# Patient Record
Sex: MALE | Race: AMERICAN INDIAN OR ALASKA NATIVE | Employment: OTHER | URBAN - METROPOLITAN AREA
[De-identification: names, ages, dates, MRNs, and addresses within clinical notes are randomized per-mention and may not be internally consistent; named-entity substitution may affect disease eponyms.]

---

## 2017-02-22 ENCOUNTER — TELEPHONE (OUTPATIENT)
Dept: FAMILY MEDICINE CLINIC | Age: 62
End: 2017-02-22

## 2017-02-22 ENCOUNTER — OFFICE VISIT (OUTPATIENT)
Dept: FAMILY MEDICINE CLINIC | Age: 62
End: 2017-02-22

## 2017-02-22 VITALS
BODY MASS INDEX: 40.6 KG/M2 | OXYGEN SATURATION: 98 % | DIASTOLIC BLOOD PRESSURE: 80 MMHG | WEIGHT: 287 LBS | SYSTOLIC BLOOD PRESSURE: 130 MMHG | HEART RATE: 123 BPM | RESPIRATION RATE: 18 BRPM

## 2017-02-22 DIAGNOSIS — G89.4 CHRONIC PAIN SYNDROME: ICD-10-CM

## 2017-02-22 DIAGNOSIS — K21.9 GASTROESOPHAGEAL REFLUX DISEASE WITHOUT ESOPHAGITIS: ICD-10-CM

## 2017-02-22 LAB — HBA1C MFR BLD: 7.3 %

## 2017-02-22 PROCEDURE — 99214 OFFICE O/P EST MOD 30 MIN: CPT | Performed by: FAMILY MEDICINE

## 2017-02-22 RX ORDER — HYDROCODONE BITARTRATE AND ACETAMINOPHEN 5; 325 MG/1; MG/1
1 TABLET ORAL 4 TIMES DAILY PRN
Qty: 360 TABLET | Refills: 0 | Status: SHIPPED | OUTPATIENT
Start: 2017-02-22 | End: 2017-06-01 | Stop reason: SDUPTHER

## 2017-02-22 RX ORDER — OMEPRAZOLE 40 MG/1
40 CAPSULE, DELAYED RELEASE ORAL DAILY
Qty: 90 CAPSULE | Refills: 1 | Status: SHIPPED | OUTPATIENT
Start: 2017-02-22 | End: 2017-06-01 | Stop reason: SDUPTHER

## 2017-02-22 RX ORDER — OLANZAPINE 10 MG/1
INJECTION, POWDER, LYOPHILIZED, FOR SOLUTION INTRAMUSCULAR
COMMUNITY
End: 2017-09-06 | Stop reason: ALTCHOICE

## 2017-02-23 RX ORDER — ESCITALOPRAM OXALATE 10 MG/1
10 TABLET ORAL DAILY
COMMUNITY
End: 2017-09-06 | Stop reason: ALTCHOICE

## 2017-03-01 DIAGNOSIS — E78.2 MIXED HYPERLIPIDEMIA: ICD-10-CM

## 2017-03-01 RX ORDER — FENOFIBRATE 160 MG/1
TABLET ORAL
Qty: 90 TABLET | Refills: 1 | Status: SHIPPED | OUTPATIENT
Start: 2017-03-01 | End: 2017-06-01 | Stop reason: SDUPTHER

## 2017-06-01 ENCOUNTER — OFFICE VISIT (OUTPATIENT)
Dept: FAMILY MEDICINE CLINIC | Age: 62
End: 2017-06-01

## 2017-06-01 ENCOUNTER — TELEPHONE (OUTPATIENT)
Dept: FAMILY MEDICINE CLINIC | Age: 62
End: 2017-06-01

## 2017-06-01 VITALS
SYSTOLIC BLOOD PRESSURE: 134 MMHG | OXYGEN SATURATION: 98 % | WEIGHT: 291 LBS | BODY MASS INDEX: 41.16 KG/M2 | DIASTOLIC BLOOD PRESSURE: 80 MMHG | HEART RATE: 97 BPM | RESPIRATION RATE: 16 BRPM

## 2017-06-01 DIAGNOSIS — T78.40XS ALLERGY, SEQUELA: ICD-10-CM

## 2017-06-01 DIAGNOSIS — F31.81 BIPOLAR 2 DISORDER (HCC): ICD-10-CM

## 2017-06-01 DIAGNOSIS — E78.2 MIXED HYPERLIPIDEMIA: ICD-10-CM

## 2017-06-01 DIAGNOSIS — Z23 NEED FOR ZOSTAVAX ADMINISTRATION: ICD-10-CM

## 2017-06-01 DIAGNOSIS — I10 ESSENTIAL HYPERTENSION: ICD-10-CM

## 2017-06-01 DIAGNOSIS — G89.4 CHRONIC PAIN SYNDROME: ICD-10-CM

## 2017-06-01 DIAGNOSIS — E29.1 HYPOGONADISM MALE: ICD-10-CM

## 2017-06-01 DIAGNOSIS — K21.9 GASTROESOPHAGEAL REFLUX DISEASE WITHOUT ESOPHAGITIS: ICD-10-CM

## 2017-06-01 LAB
CHOLESTEROL, TOTAL: 173 MG/DL (ref 0–199)
HDLC SERPL-MCNC: 44 MG/DL (ref 40–60)
LDL CHOLESTEROL CALCULATED: ABNORMAL MG/DL
TRIGL SERPL-MCNC: 320 MG/DL (ref 0–150)
VLDLC SERPL CALC-MCNC: ABNORMAL MG/DL

## 2017-06-01 PROCEDURE — 99214 OFFICE O/P EST MOD 30 MIN: CPT | Performed by: FAMILY MEDICINE

## 2017-06-01 PROCEDURE — 36415 COLL VENOUS BLD VENIPUNCTURE: CPT | Performed by: FAMILY MEDICINE

## 2017-06-01 RX ORDER — ESCITALOPRAM OXALATE 10 MG/1
10 TABLET ORAL DAILY
Qty: 30 TABLET | Refills: 5 | Status: CANCELLED | OUTPATIENT
Start: 2017-06-01

## 2017-06-01 RX ORDER — FENOFIBRATE 160 MG/1
TABLET ORAL
Qty: 30 TABLET | Refills: 3 | Status: SHIPPED | OUTPATIENT
Start: 2017-06-01 | End: 2017-11-30 | Stop reason: ALTCHOICE

## 2017-06-01 RX ORDER — HYDROCODONE BITARTRATE AND ACETAMINOPHEN 5; 325 MG/1; MG/1
1 TABLET ORAL 4 TIMES DAILY PRN
Qty: 360 TABLET | Refills: 0 | Status: SHIPPED | OUTPATIENT
Start: 2017-06-01 | End: 2017-09-06 | Stop reason: SDUPTHER

## 2017-06-01 RX ORDER — PRAVASTATIN SODIUM 80 MG/1
TABLET ORAL
Qty: 30 TABLET | Refills: 3 | Status: SHIPPED | OUTPATIENT
Start: 2017-06-01 | End: 2017-11-30 | Stop reason: SDUPTHER

## 2017-06-01 RX ORDER — FLUTICASONE PROPIONATE 50 MCG
SPRAY, SUSPENSION (ML) NASAL
Qty: 1 BOTTLE | Refills: 3 | Status: SHIPPED | OUTPATIENT
Start: 2017-06-01 | End: 2017-09-06 | Stop reason: ALTCHOICE

## 2017-06-01 RX ORDER — ESZOPICLONE 3 MG/1
3 TABLET, FILM COATED ORAL NIGHTLY PRN
Qty: 30 TABLET | Refills: 3 | Status: SHIPPED | OUTPATIENT
Start: 2017-06-01 | End: 2018-01-04 | Stop reason: ALTCHOICE

## 2017-06-01 RX ORDER — ESZOPICLONE 3 MG/1
3 TABLET, FILM COATED ORAL NIGHTLY
COMMUNITY
End: 2017-09-06 | Stop reason: SDUPTHER

## 2017-06-01 RX ORDER — GABAPENTIN 800 MG/1
TABLET ORAL
Qty: 90 TABLET | Refills: 3 | Status: SHIPPED | OUTPATIENT
Start: 2017-06-01 | End: 2017-11-30 | Stop reason: ALTCHOICE

## 2017-06-01 RX ORDER — LOSARTAN POTASSIUM AND HYDROCHLOROTHIAZIDE 12.5; 1 MG/1; MG/1
1 TABLET ORAL DAILY
Qty: 30 TABLET | Refills: 3 | Status: SHIPPED | OUTPATIENT
Start: 2017-06-01 | End: 2017-09-06 | Stop reason: SDUPTHER

## 2017-06-01 RX ORDER — OMEPRAZOLE 40 MG/1
40 CAPSULE, DELAYED RELEASE ORAL DAILY
Qty: 30 CAPSULE | Refills: 3 | Status: SHIPPED | OUTPATIENT
Start: 2017-06-01 | End: 2017-09-06 | Stop reason: SDUPTHER

## 2017-06-01 RX ORDER — TESTOSTERONE 10 MG/.5G
GEL, METERED TOPICAL
Qty: 1 BOTTLE | Refills: 3 | Status: SHIPPED | OUTPATIENT
Start: 2017-06-01 | End: 2017-07-26 | Stop reason: CLARIF

## 2017-06-02 LAB
CREATININE URINE: 122.9 MG/DL (ref 39–259)
ESTIMATED AVERAGE GLUCOSE: 185.8 MG/DL
HBA1C MFR BLD: 8.1 %
LDL CHOLESTEROL DIRECT: 89 MG/DL
MICROALBUMIN UR-MCNC: <1.2 MG/DL
MICROALBUMIN/CREAT UR-RTO: NORMAL MG/G (ref 0–30)

## 2017-06-26 ENCOUNTER — TELEPHONE (OUTPATIENT)
Dept: FAMILY MEDICINE CLINIC | Age: 62
End: 2017-06-26

## 2017-07-26 RX ORDER — TESTOSTERONE 30 MG/1.5ML
SOLUTION TOPICAL
Qty: 1 BOTTLE | Refills: 5 | Status: SHIPPED | OUTPATIENT
Start: 2017-07-26 | End: 2017-08-07 | Stop reason: SDUPTHER

## 2017-08-07 RX ORDER — TESTOSTERONE 30 MG/1.5ML
SOLUTION TOPICAL
Qty: 1 BOTTLE | Refills: 5 | Status: SHIPPED | OUTPATIENT
Start: 2017-08-07 | End: 2018-02-22 | Stop reason: SDUPTHER

## 2017-09-06 ENCOUNTER — OFFICE VISIT (OUTPATIENT)
Dept: FAMILY MEDICINE CLINIC | Age: 62
End: 2017-09-06

## 2017-09-06 VITALS
SYSTOLIC BLOOD PRESSURE: 104 MMHG | DIASTOLIC BLOOD PRESSURE: 78 MMHG | BODY MASS INDEX: 35.08 KG/M2 | OXYGEN SATURATION: 96 % | HEART RATE: 107 BPM | WEIGHT: 248 LBS

## 2017-09-06 DIAGNOSIS — M17.0 PRIMARY OSTEOARTHRITIS OF BOTH KNEES: ICD-10-CM

## 2017-09-06 DIAGNOSIS — K21.9 GASTROESOPHAGEAL REFLUX DISEASE WITHOUT ESOPHAGITIS: ICD-10-CM

## 2017-09-06 DIAGNOSIS — Z23 NEED FOR INFLUENZA VACCINATION: ICD-10-CM

## 2017-09-06 DIAGNOSIS — M54.16 LUMBAR RADICULAR PAIN: ICD-10-CM

## 2017-09-06 DIAGNOSIS — I10 ESSENTIAL HYPERTENSION: ICD-10-CM

## 2017-09-06 DIAGNOSIS — F51.01 PRIMARY INSOMNIA: Primary | ICD-10-CM

## 2017-09-06 DIAGNOSIS — G89.4 CHRONIC PAIN SYNDROME: ICD-10-CM

## 2017-09-06 LAB
A/G RATIO: 1.8 (ref 1.1–2.2)
ALBUMIN SERPL-MCNC: 4.8 G/DL (ref 3.4–5)
ALP BLD-CCNC: 65 U/L (ref 40–129)
ALT SERPL-CCNC: 42 U/L (ref 10–40)
ANION GAP SERPL CALCULATED.3IONS-SCNC: 27 MMOL/L (ref 3–16)
AST SERPL-CCNC: 37 U/L (ref 15–37)
BILIRUB SERPL-MCNC: 0.3 MG/DL (ref 0–1)
BUN BLDV-MCNC: 16 MG/DL (ref 7–20)
CALCIUM SERPL-MCNC: 9.8 MG/DL (ref 8.3–10.6)
CHLORIDE BLD-SCNC: 92 MMOL/L (ref 99–110)
CO2: 23 MMOL/L (ref 21–32)
CREAT SERPL-MCNC: 1.1 MG/DL (ref 0.8–1.3)
GFR AFRICAN AMERICAN: >60
GFR NON-AFRICAN AMERICAN: >60
GLOBULIN: 2.6 G/DL
GLUCOSE BLD-MCNC: 78 MG/DL (ref 70–99)
POTASSIUM SERPL-SCNC: 3.3 MMOL/L (ref 3.5–5.1)
SODIUM BLD-SCNC: 142 MMOL/L (ref 136–145)
TOTAL PROTEIN: 7.4 G/DL (ref 6.4–8.2)

## 2017-09-06 PROCEDURE — 90471 IMMUNIZATION ADMIN: CPT | Performed by: FAMILY MEDICINE

## 2017-09-06 PROCEDURE — 90688 IIV4 VACCINE SPLT 0.5 ML IM: CPT | Performed by: FAMILY MEDICINE

## 2017-09-06 PROCEDURE — 99214 OFFICE O/P EST MOD 30 MIN: CPT | Performed by: FAMILY MEDICINE

## 2017-09-06 PROCEDURE — 36415 COLL VENOUS BLD VENIPUNCTURE: CPT | Performed by: FAMILY MEDICINE

## 2017-09-06 RX ORDER — PREDNISONE 20 MG/1
TABLET ORAL
Qty: 20 TABLET | Refills: 0 | Status: SHIPPED | OUTPATIENT
Start: 2017-09-06 | End: 2017-10-05

## 2017-09-06 RX ORDER — HYDROCODONE BITARTRATE AND ACETAMINOPHEN 5; 325 MG/1; MG/1
1 TABLET ORAL 4 TIMES DAILY PRN
Qty: 120 TABLET | Refills: 0 | Status: SHIPPED | OUTPATIENT
Start: 2017-09-06 | End: 2017-10-05 | Stop reason: SDUPTHER

## 2017-09-06 RX ORDER — LOSARTAN POTASSIUM AND HYDROCHLOROTHIAZIDE 12.5; 1 MG/1; MG/1
1 TABLET ORAL DAILY
Qty: 30 TABLET | Refills: 3 | Status: SHIPPED | OUTPATIENT
Start: 2017-09-06 | End: 2017-11-30 | Stop reason: ALTCHOICE

## 2017-09-06 RX ORDER — OMEPRAZOLE 40 MG/1
40 CAPSULE, DELAYED RELEASE ORAL DAILY
Qty: 30 CAPSULE | Refills: 3 | Status: SHIPPED | OUTPATIENT
Start: 2017-09-06 | End: 2017-11-30 | Stop reason: SDUPTHER

## 2017-09-06 RX ORDER — ESZOPICLONE 3 MG/1
3 TABLET, FILM COATED ORAL NIGHTLY
Qty: 30 TABLET | Refills: 0 | Status: SHIPPED | OUTPATIENT
Start: 2017-09-06 | End: 2017-11-01 | Stop reason: ALTCHOICE

## 2017-09-07 LAB
ESTIMATED AVERAGE GLUCOSE: 125.5 MG/DL
HBA1C MFR BLD: 6 %

## 2017-10-05 ENCOUNTER — OFFICE VISIT (OUTPATIENT)
Dept: FAMILY MEDICINE CLINIC | Age: 62
End: 2017-10-05

## 2017-10-05 VITALS
TEMPERATURE: 98.7 F | SYSTOLIC BLOOD PRESSURE: 118 MMHG | DIASTOLIC BLOOD PRESSURE: 72 MMHG | OXYGEN SATURATION: 95 % | BODY MASS INDEX: 32.76 KG/M2 | WEIGHT: 231.6 LBS | HEART RATE: 109 BPM

## 2017-10-05 DIAGNOSIS — G89.4 CHRONIC PAIN SYNDROME: ICD-10-CM

## 2017-10-05 DIAGNOSIS — N52.1 ERECTILE DYSFUNCTION DUE TO DISEASES CLASSIFIED ELSEWHERE: ICD-10-CM

## 2017-10-05 DIAGNOSIS — E11.9 CONTROLLED TYPE 2 DIABETES MELLITUS WITHOUT COMPLICATION, WITHOUT LONG-TERM CURRENT USE OF INSULIN (HCC): Primary | ICD-10-CM

## 2017-10-05 DIAGNOSIS — E29.1 HYPOGONADISM MALE: ICD-10-CM

## 2017-10-05 DIAGNOSIS — E87.6 HYPOKALEMIA: ICD-10-CM

## 2017-10-05 PROCEDURE — 99214 OFFICE O/P EST MOD 30 MIN: CPT | Performed by: FAMILY MEDICINE

## 2017-10-05 PROCEDURE — 36415 COLL VENOUS BLD VENIPUNCTURE: CPT | Performed by: FAMILY MEDICINE

## 2017-10-05 RX ORDER — GLUCOSAMINE HCL/CHONDROITIN SU 500-400 MG
CAPSULE ORAL
Qty: 100 STRIP | Refills: 5 | Status: SHIPPED | OUTPATIENT
Start: 2017-10-05 | End: 2020-06-15

## 2017-10-05 RX ORDER — HYDROCODONE BITARTRATE AND ACETAMINOPHEN 5; 325 MG/1; MG/1
1 TABLET ORAL 4 TIMES DAILY PRN
Qty: 120 TABLET | Refills: 0 | Status: SHIPPED | OUTPATIENT
Start: 2017-10-05 | End: 2017-11-01 | Stop reason: SDUPTHER

## 2017-10-05 RX ORDER — TADALAFIL 20 MG/1
20 TABLET ORAL PRN
Qty: 6 TABLET | Refills: 5 | Status: SHIPPED | OUTPATIENT
Start: 2017-10-05 | End: 2018-01-04 | Stop reason: ALTCHOICE

## 2017-10-05 NOTE — PROGRESS NOTES
follow-up. Diagnoses and all orders for this visit:    Controlled type 2 diabetes mellitus without complication, without long-term current use of insulin ([facility] [address] RF:  -     Glucose Blood (BLOOD GLUCOSE TEST STRIPS) STRP; One touch ultra 2;Checks blood sugar bid  -     Empagliflozin-Linagliptin (GLYXAMBI) 25-5 MG TABS; Take 1 tablet by mouth daily  -     metFORMIN (GLUCOPHAGE) 1000 MG tablet; Take 1 tablet by mouth 2 times daily (with meals)   Wt loss and improved control applauded. Will ask if he has stopped zyprexa at 2 wk f/u. It is no longer on med list.  If off it, wt loss would be expected but will need to watch for ryan. Chronic pain syndrome  -     HYDROcodone-acetaminophen (NORCO) 5-325 MG per tablet; Take 1 tablet by mouth 4 times daily as needed for Pain . Earliest Fill Date: 10/5/17   Pt's insur does not cover [employer], as it was thought Dr Isaac Pepe may be good option. Pt will look into insur for PMR/physiotrist/pain mgmt. Hypokalemia  -     POTASSIUM   Pt requests rx if again low. Pt is on hyzaar. Erectile dysfunction due to diseases classified elsewhere, hypogonadism  -     tadalafil (CIALIS) 20 MG tablet; Take 1 tablet by mouth as needed for Erectile Dysfunction, new rx. Coupon given. Hypogonadism male   Cont axerone. Knee OA   Repeat knee xray order written on rx pad, along with requested ankle xrays.    Steroid injections next visit pending xrays

## 2017-10-05 NOTE — MR AVS SNAPSHOT
After Visit Summary             Terrell Moore   10/5/2017 1:15 PM   Office Visit    Description:  Male : 1955   Provider:  Sangeetha Nazario MD   Department:  [address] [place] [address] and Future Appointments         Below is a list of your follow-up and future appointments. This may not be a complete list as you may have made appointments directly with providers that we are not aware of or your providers may have made some for you. Please call your providers to confirm appointments. It is important to keep your appointments. Please bring your current insurance card, photo ID, co-pay, and all medication bottles to your appointment. If self-pay, payment is expected at the time of service. Your To-Do List     Follow-Up    Return in about 2 weeks (around 10/19/2017). Information from Your Visit        Department     Name Address Phone Fax    [address] [address] [address] [place] [address] [phone]      You Were Seen for:         Comments    Controlled type 2 diabetes mellitus without complication, without long-term current use of insulin [facility])   [[id number]]         Vital Signs     Blood Pressure Pulse Temperature Weight Oxygen Saturation Body Mass Index    118/72 (Site: Left Arm, Position: Sitting, Cuff Size: Small Adult) 109 98.7 °F (37.1 °C) (Oral) 231 lb 9.6 oz (105.1 kg) 95% 32.76 kg/m2    Smoking Status                   Former Smoker           Instructions    Look into insurance to find physiatrist, PM&R (physical medicine and rehab specialist), pain specialist/anesthesiologist for back pain. Today's Medication Changes          These changes are accurate as of: 10/5/17  2:01 PM.  If you have any questions, ask your nurse or doctor.                START taking these medications           tadalafil 20 MG tablet   Commonly known as:  CIALIS Instructions: Take 1 tablet by mouth as needed for Erectile Dysfunction   Quantity:  6 tablet   Refills:  5   Started by:  Alona Pitts MD         STOP taking these medications           predniSONE 20 MG tablet   Commonly known as:  [place] by:  Alona Pitts MD            Where to Get Your Medications      These medications were sent to [facility] [address] [facility] [address] [phone] [address] [facility], [address], [address] [facility] [address]     Phone:  [phone]     BLOOD GLUCOSE TEST STRIPS Strp    Empagliflozin-Linagliptin 25-5 MG Tabs    metFORMIN 1000 MG tablet    tadalafil 20 MG tablet         You can get these medications from any pharmacy     Bring a paper prescription for each of these medications     HYDROcodone-acetaminophen 5-325 MG per tablet               Your Current Medications Are              Glucose Blood (BLOOD GLUCOSE TEST STRIPS) STRP One touch ultra 2;Checks blood sugar bid    Empagliflozin-Linagliptin (GLYXAMBI) 25-5 MG TABS Take 1 tablet by mouth daily    metFORMIN (GLUCOPHAGE) 1000 MG tablet Take 1 tablet by mouth 2 times daily (with meals)    HYDROcodone-acetaminophen (NORCO) 5-325 MG per tablet Take 1 tablet by mouth 4 times daily as needed for Pain .  Earliest Fill Date: 10/5/17    tadalafil (CIALIS) 20 MG tablet Take 1 tablet by mouth as needed for Erectile Dysfunction    eszopiclone (ESZOPICLONE) 3 MG TABS Take 1 tablet by mouth nightly    losartan-hydrochlorothiazide (HYZAAR) 100-12.5 MG per tablet Take 1 tablet by mouth daily    omeprazole (PRILOSEC) 40 MG delayed release capsule Take 1 capsule by mouth daily    Testosterone (AXIRON) 30 MG/ACT SOLN Apply 1 pump to each underarm every morning    diclofenac sodium (VOLTAREN) 1 % GEL APPLY 4 GRAMS TOPICALLY 4 TIMES DAILY    fenofibrate 160 MG tablet TAKE 1 TABLET BY MOUTH DAILY    gabapentin (NEURONTIN) 800 MG tablet TAKE 1 TABLET BY MOUTH THREE TIMES A DAY pravastatin (PRAVACHOL) 80 MG tablet TAKE 1 TABLET BY MOUTH DAILY    eszopiclone (ESZOPICLONE) 3 MG TABS Take 1 tablet by mouth nightly as needed (insomnia)    buPROPion (WELLBUTRIN XL) 300 MG XL tablet Take 1 tablet by mouth every morning    Cholecalciferol (VITAMIN D) 2000 UNITS CAPS capsule Take  by mouth 2 times daily. Allergies              Nsaids       We Ordered/Performed the following           POTASSIUM          Additional Information        Basic Information     Date Of Birth Sex Race Ethnicity Preferred Language    1955 Male White Non-/Non  English      Problem List as of 10/5/2017  Date Reviewed: 10/5/2017                Hypogonadism male    Bipolar 2 disorder ([facility] [address]    Chronic back pain    HLD (hyperlipidemia)    HTN (hypertension)    Ulcerative colitis ([facility] [address]      Immunizations as of 10/5/2017     Name Date    Influenza, Intradermal, Preservative free 10/12/2015, 10/13/2014    Influenza, Intradermal, Quadrivalent, Preservative Free 11/28/2016    Influenza, Quadv, 3 Years and older, IM 9/6/2017    Pneumococcal Polysaccharide (Iavjtfaxd01) 1/1/2000    Tdap (Boostrix, Adacel) 7/16/2014      Preventive Care        Date Due    Colonoscopy 5/4/2010    Zoster Vaccine 4/2/2017    Diabetic Foot Exam 2/22/2018    Eye Exam By An Eye Doctor 2/24/2018    Urine Check For Kidney Problems 6/1/2018    Cholesterol Screening 6/1/2018    Hemoglobin A1C (Test For Long-Term Glucose Control) 9/6/2018    Tetanus Combination Vaccine (2 - Td) 7/16/2024            MyChart Signup           [employer] allows you to send messages to your doctor, view your test results, renew your prescriptions, schedule appointments, view visit notes, and more. How Do I Sign Up? 1. In your Internet browser, go to [email or web address]  2. Click on the Sign Up Now link in the Sign In box. You will see the New Member Sign Up page. 3. Enter your Command Information Access Code exactly as it appears below. You will not need to use this code after youve completed the sign-up process. If you do not sign up before the expiration date, you must request a new code. Command Information Access Code: [email or web address]  Expires: 11/5/2017  1:50 PM    4. Enter your Social Security Number ([id number]) and Date of Birth (mm/dd/yyyy) as indicated and click Submit. You will be taken to the next sign-up page. 5. Create a Command Information ID. This will be your Command Information login ID and cannot be changed, so think of one that is secure and easy to remember. 6. Create a Command Information password. You can change your password at any time. 7. Enter your Password Reset Question and Answer. This can be used at a later time if you forget your password. 8. Enter your e-mail address. You will receive e-mail notification when new information is available in [address]. 9. Click Sign Up. You can now view your medical record. Additional Information  If you have questions, please contact the physician practice where you receive care. Remember, Command Information is NOT to be used for urgent needs. For medical emergencies, dial 911. For questions regarding your Command Information account call [phone]. If you have a clinical question, please call your doctor's office.

## 2017-10-06 PROBLEM — E11.9 CONTROLLED TYPE 2 DIABETES MELLITUS WITHOUT COMPLICATION, WITHOUT LONG-TERM CURRENT USE OF INSULIN (HCC): Status: ACTIVE | Noted: 2017-10-06

## 2017-10-06 LAB — POTASSIUM SERPL-SCNC: 3.9 MMOL/L (ref 3.5–5.1)

## 2017-10-06 ASSESSMENT — ENCOUNTER SYMPTOMS
CHEST TIGHTNESS: 0
ABDOMINAL PAIN: 0
WHEEZING: 0
COUGH: 0

## 2017-10-18 ENCOUNTER — OFFICE VISIT (OUTPATIENT)
Dept: FAMILY MEDICINE CLINIC | Age: 62
End: 2017-10-18

## 2017-10-18 VITALS
DIASTOLIC BLOOD PRESSURE: 72 MMHG | BODY MASS INDEX: 32.39 KG/M2 | WEIGHT: 229 LBS | SYSTOLIC BLOOD PRESSURE: 118 MMHG | OXYGEN SATURATION: 96 % | HEART RATE: 104 BPM

## 2017-10-18 DIAGNOSIS — K51.011 ULCERATIVE PANCOLITIS WITH RECTAL BLEEDING (HCC): ICD-10-CM

## 2017-10-18 DIAGNOSIS — M17.0 PRIMARY OSTEOARTHRITIS OF BOTH KNEES: Primary | ICD-10-CM

## 2017-10-18 DIAGNOSIS — N52.9 ED (ERECTILE DYSFUNCTION) OF ORGANIC ORIGIN: ICD-10-CM

## 2017-10-18 DIAGNOSIS — M54.16 LUMBAR RADICULOPATHY: ICD-10-CM

## 2017-10-18 PROCEDURE — 20610 DRAIN/INJ JOINT/BURSA W/O US: CPT | Performed by: FAMILY MEDICINE

## 2017-10-18 PROCEDURE — 99214 OFFICE O/P EST MOD 30 MIN: CPT | Performed by: FAMILY MEDICINE

## 2017-10-18 RX ORDER — METHYLPREDNISOLONE ACETATE 80 MG/ML
80 INJECTION, SUSPENSION INTRA-ARTICULAR; INTRALESIONAL; INTRAMUSCULAR; SOFT TISSUE ONCE
Status: COMPLETED | OUTPATIENT
Start: 2017-10-18 | End: 2017-10-18

## 2017-10-18 RX ORDER — TRAMADOL HYDROCHLORIDE 50 MG/1
50 TABLET ORAL EVERY 6 HOURS PRN
Qty: 120 TABLET | Refills: 0 | Status: SHIPPED | OUTPATIENT
Start: 2017-10-18 | End: 2017-10-28

## 2017-10-18 RX ADMIN — METHYLPREDNISOLONE ACETATE 80 MG: 80 INJECTION, SUSPENSION INTRA-ARTICULAR; INTRALESIONAL; INTRAMUSCULAR; SOFT TISSUE at 15:17

## 2017-10-18 RX ADMIN — METHYLPREDNISOLONE ACETATE 80 MG: 80 INJECTION, SUSPENSION INTRA-ARTICULAR; INTRALESIONAL; INTRAMUSCULAR; SOFT TISSUE at 15:16

## 2017-10-18 NOTE — PROGRESS NOTES
Subjective:      Patient ID: Anjelica Hutson is a 63 y.o. male. HPI  Chief Complaint   Patient presents with    2 Week Follow-Up     leg pain. getting worse. After appt here 10/5/17, fell as he walked up steps outside of home. Pt fell onto L buttocks, since has had increase LLE pain, has pre existing lumbar radic. Had b/l knee and ankle xrays as ordered last visit. OA changes seen, o/w nl. Has lost 75 lb, no improvement in LE pain. No change in stool/urine control. No saddle anesthesia. Since L4-5 discectomy 2007, has numbness or heat sensation L lateral thigh. Since fall, has increased cramping feeling. L calf also crampy. Since fall, was taking 9 norco per day, will run out early. Is having flare of UC as he has been taking ibu 200 2 po tid + 600 mg qhs. Has bloody diarrhea 3-4 times per day since on nsaids. Felt hyper in past with on pred, had insom. UC exac is not intolerable. Cialis is cost prohibitive. Is awaiting his own trial of d/c of certain meds. Has been off wellbutrin x 1 wk, feels emotinally well. Will next d/c losartan. Review of Systems   Constitutional: Negative for fatigue and fever. HENT: Negative. Respiratory: Negative for chest tightness, shortness of breath and wheezing. Cardiovascular: Negative for chest pain, palpitations and leg swelling. Gastrointestinal: Positive for anal bleeding and diarrhea. Negative for abdominal distention and abdominal pain. Objective:   Physical Exam   Constitutional: He is oriented to person, place, and time. He appears well-developed and well-nourished. HENT:   Head: Normocephalic and atraumatic. Eyes: Conjunctivae are normal. Pupils are equal, round, and reactive to light. Left eye exhibits no discharge. Neck: Neck supple. No thyromegaly present. Cardiovascular: Normal rate, regular rhythm and normal heart sounds. Pulmonary/Chest: Effort normal and breath sounds normal.   Abdominal: Soft.  Bowel sounds are normal. He exhibits no distension and no mass. There is no tenderness. Musculoskeletal: He exhibits deformity. He exhibits no edema. Bony deformity of b/l knees  Crepitus b/l knees   Neurological: He is alert and oriented to person, place, and time. Psychiatric: He has a normal mood and affect. His behavior is normal. Judgment and thought content normal.   Pos L SLR  /72 (Site: Left Arm, Position: Sitting)   Pulse 104   Wt 229 lb (103.9 kg)   SpO2 96%   BMI 32.39 kg/m²     Assessment:            Plan:      Frank Foster was seen today for 2 week follow-up. Diagnoses and all orders for this visit:    Primary osteoarthritis of both knees   After verbal consent was obtained, med below was injected into R and L knee, medial approach taken, tolerated well, no bleeding.  -     09203 - MO DRAIN/INJECT LARGE JOINT/BURSA  -     40215 - MO DRAIN/INJECT LARGE JOINT/BURSA  -     methylPREDNISolone acetate (DEPO-MEDROL) injection 80 mg; Inject 1 mL into the articular space once  -     methylPREDNISolone acetate (DEPO-MEDROL) injection 80 mg; Inject 1 mL into the articular space once   Pt to consider ortho referral if injections not helpful. Lumbar radiculopathy  -     traMADol (ULTRAM) 50 MG tablet; Take 1 tablet by mouth every 6 hours as needed for Pain   Med is a 1 time order to help him supplement hsi norco as he has used more than allotted amt 2/2 excess pain. Importance of taking med only as rx'd discussed. Refer to Dr Nasir Quintana in [place] [address]. ED or organic origin   Pt is doing experiment to elimate 1 med per wk at a time to find etiology of ED, which may be 2/2 DM and age. Concern that he is off wellbutrin. Pt to see his psychiatrist in Dec and will review. Howevver, if pt feels unstable/manic, he will contact [facility] or psychiatrist.    Ulcerative colitis with rectal bleeding    Pt does not tolerate po steroids. Consider Tyl instead of ibu.

## 2017-10-20 ASSESSMENT — ENCOUNTER SYMPTOMS
ABDOMINAL DISTENTION: 0
SHORTNESS OF BREATH: 0
ABDOMINAL PAIN: 0
DIARRHEA: 1
WHEEZING: 0
ANAL BLEEDING: 1
CHEST TIGHTNESS: 0

## 2017-11-01 ENCOUNTER — OFFICE VISIT (OUTPATIENT)
Dept: FAMILY MEDICINE CLINIC | Age: 62
End: 2017-11-01

## 2017-11-01 VITALS
HEART RATE: 98 BPM | OXYGEN SATURATION: 94 % | BODY MASS INDEX: 31.12 KG/M2 | SYSTOLIC BLOOD PRESSURE: 110 MMHG | DIASTOLIC BLOOD PRESSURE: 80 MMHG | WEIGHT: 220 LBS

## 2017-11-01 DIAGNOSIS — G89.4 CHRONIC PAIN SYNDROME: ICD-10-CM

## 2017-11-01 DIAGNOSIS — N52.01 ERECTILE DYSFUNCTION DUE TO ARTERIAL INSUFFICIENCY: ICD-10-CM

## 2017-11-01 DIAGNOSIS — E78.2 MIXED HYPERLIPIDEMIA: ICD-10-CM

## 2017-11-01 DIAGNOSIS — M54.16 LUMBAR RADICULOPATHY: ICD-10-CM

## 2017-11-01 DIAGNOSIS — I10 BENIGN ESSENTIAL HTN: ICD-10-CM

## 2017-11-01 DIAGNOSIS — K51.011 ULCERATIVE PANCOLITIS WITH RECTAL BLEEDING (HCC): ICD-10-CM

## 2017-11-01 DIAGNOSIS — E29.1 HYPOGONADISM IN MALE: Primary | ICD-10-CM

## 2017-11-01 DIAGNOSIS — F51.04 CHRONIC INSOMNIA: ICD-10-CM

## 2017-11-01 LAB
CHOLESTEROL, TOTAL: 272 MG/DL (ref 0–199)
HCT VFR BLD CALC: 49.8 % (ref 40.5–52.5)
HDLC SERPL-MCNC: 52 MG/DL (ref 40–60)
HEMOGLOBIN: 16.4 G/DL (ref 13.5–17.5)
LDL CHOLESTEROL CALCULATED: 180 MG/DL
MCH RBC QN AUTO: 30.8 PG (ref 26–34)
MCHC RBC AUTO-ENTMCNC: 33 G/DL (ref 31–36)
MCV RBC AUTO: 93.4 FL (ref 80–100)
PDW BLD-RTO: 16.1 % (ref 12.4–15.4)
PLATELET # BLD: 268 K/UL (ref 135–450)
PMV BLD AUTO: 7.8 FL (ref 5–10.5)
RBC # BLD: 5.34 M/UL (ref 4.2–5.9)
TRIGL SERPL-MCNC: 201 MG/DL (ref 0–150)
VLDLC SERPL CALC-MCNC: 40 MG/DL
WBC # BLD: 8.8 K/UL (ref 4–11)

## 2017-11-01 PROCEDURE — 36415 COLL VENOUS BLD VENIPUNCTURE: CPT | Performed by: FAMILY MEDICINE

## 2017-11-01 PROCEDURE — 99214 OFFICE O/P EST MOD 30 MIN: CPT | Performed by: FAMILY MEDICINE

## 2017-11-01 RX ORDER — HYDROCODONE BITARTRATE AND ACETAMINOPHEN 5; 325 MG/1; MG/1
1 TABLET ORAL 4 TIMES DAILY PRN
Qty: 120 TABLET | Refills: 0 | Status: SHIPPED | OUTPATIENT
Start: 2017-11-01 | End: 2017-11-30 | Stop reason: SDUPTHER

## 2017-11-01 RX ORDER — SILDENAFIL 100 MG/1
100 TABLET, FILM COATED ORAL PRN
Qty: 30 TABLET | Refills: 1 | Status: SHIPPED | OUTPATIENT
Start: 2017-11-01

## 2017-11-01 ASSESSMENT — ENCOUNTER SYMPTOMS
VOMITING: 0
CONSTIPATION: 0
NAUSEA: 0
SHORTNESS OF BREATH: 0
BACK PAIN: 1
WHEEZING: 0
EYES NEGATIVE: 1
ANAL BLEEDING: 0
ABDOMINAL DISTENTION: 0
ABDOMINAL PAIN: 1
DIARRHEA: 1
COLOR CHANGE: 0
BLOOD IN STOOL: 0
CHEST TIGHTNESS: 0

## 2017-11-01 NOTE — PROGRESS NOTES
Patient: Matthew Huff is a 63 y.o. male who presents today with the following Chief Complaint(s):  Chief Complaint   Patient presents with    Medication Check         HPI: ***      Current Outpatient Prescriptions   Medication Sig Dispense Refill    sildenafil (VIAGRA) 100 MG tablet Take 1 tablet by mouth as needed for Erectile Dysfunction 30 tablet 1    HYDROcodone-acetaminophen (NORCO) 5-325 MG per tablet Take 1 tablet by mouth 4 times daily as needed for Pain  Fill 11/5/17. 120 tablet 0    Glucose Blood (BLOOD GLUCOSE TEST STRIPS) STRP One touch ultra 2;Checks blood sugar bid 100 strip 5    Empagliflozin-Linagliptin (GLYXAMBI) 25-5 MG TABS Take 1 tablet by mouth daily 30 tablet 5    metFORMIN (GLUCOPHAGE) 1000 MG tablet Take 1 tablet by mouth 2 times daily (with meals) 60 tablet 5    losartan-hydrochlorothiazide (HYZAAR) 100-12.5 MG per tablet Take 1 tablet by mouth daily 30 tablet 3    Testosterone (AXIRON) 30 MG/ACT SOLN Apply 1 pump to each underarm every morning 1 Bottle 5    diclofenac sodium (VOLTAREN) 1 % GEL APPLY 4 GRAMS TOPICALLY 4 TIMES DAILY 500 g 5    buPROPion (WELLBUTRIN XL) 300 MG XL tablet Take 1 tablet by mouth every morning 90 tablet 1    Cholecalciferol (VITAMIN D) 2000 UNITS CAPS capsule Take  by mouth 2 times daily.  tadalafil (CIALIS) 20 MG tablet Take 1 tablet by mouth as needed for Erectile Dysfunction 6 tablet 5    omeprazole (PRILOSEC) 40 MG delayed release capsule Take 1 capsule by mouth daily 30 capsule 3    fenofibrate 160 MG tablet TAKE 1 TABLET BY MOUTH DAILY 30 tablet 3    gabapentin (NEURONTIN) 800 MG tablet TAKE 1 TABLET BY MOUTH THREE TIMES A DAY 90 tablet 3    pravastatin (PRAVACHOL) 80 MG tablet TAKE 1 TABLET BY MOUTH DAILY 30 tablet 3    eszopiclone (ESZOPICLONE) 3 MG TABS Take 1 tablet by mouth nightly as needed (insomnia) 30 tablet 3     No current facility-administered medications for this visit.         Patient's past medical history, surgical history, family history, medications,  and allergies  were all reviewed and updated as appropriate today. ROS      Physical Exam      Vitals:    11/01/17 1401   BP: 110/80   Pulse: 98   SpO2: 94%       Assessment/Plan:    Sunni Barnett was seen today for medication check. Diagnoses and all orders for this visit:    Hypogonadism in male  -     sildenafil (VIAGRA) 100 MG tablet; Take 1 tablet by mouth as needed for Erectile Dysfunction  -     Testosterone Free and Total Male  -     External Referral To Urology    Erectile dysfunction due to arterial insufficiency  -     sildenafil (VIAGRA) 100 MG tablet; Take 1 tablet by mouth as needed for Erectile Dysfunction  -     Testosterone Free and Total Male  -     External Referral To Urology    Ulcerative pancolitis with rectal bleeding (HCC)  -     CBC    Mixed hyperlipidemia  -     Lipid Panel    Chronic pain syndrome  -     HYDROcodone-acetaminophen (NORCO) 5-325 MG per tablet; Take 1 tablet by mouth 4 times daily as needed for Pain  Fill 11/5/17. Lumbar radiculopathy  -     HYDROcodone-acetaminophen (NORCO) 5-325 MG per tablet; Take 1 tablet by mouth 4 times daily as needed for Pain  Fill 11/5/17.

## 2017-11-01 NOTE — PROGRESS NOTES
Subjective:      Patient ID: Ant Kent is a 63 y.o. male. HPI  Chief Complaint   Patient presents with    Medication Check     Feels well other than severe back pain with L lumbar radic and ED. Has appt with Dr Boni Petty 11/13/17. Cont's to work on wt loss. Has bowl oatmeal for lunch, frozen steamed veg for lunch and hamburger or other piece of meat for dinner with A1 sauce w/o sides. Does not snack. Gave up Mtn Dew. Is down 70 lb in past 5 mo. Getting off psych meds may contribute to wt loss, but most is 2/2 significant calorie restriction. Stress is better since early long term, no futher work stress. No desire for stress eating. Pt's goal wt is 160 lb. Has discontinued all meds 1 by 1 to see if any was the cause of ED. Has found Lunesta, norco pain meds, losartan worsen ED. Has not resumed lunesta after a trial w/o it as he is no longer working and no longer needs routine sleep. Has been off losartan x 3 wks and bp ath home avg's 120s/80. Wonders if he needs to resume. Also, has not taken prav or fenofibrate x 3 wks. Has found they do not affect ED but wonders if he needs 2/2 wt loss. Pt stopped metfor and glyxambi x 2 wks, FBS increased 15 pts, no correlation with ED. Has resumed these meds. Few times per month, awakens with erection but most days does not. Girlfriend will visit from [place] in Dec and pt is hoping to find resolution to ED by then. Tried cialis 20 since last visit, tried 1 pill only, had no effect. Has been back on Axiron x 3 mo. Feels his muscles do not fatigue with axiron but ED has not improved. Uses 1 squirt or 30 mg qd under ea arm. Has not had test checked since prior to [address] 7/17. Knee pain is better since steroid injections last visit. R knee pain is 4/10 but LLE pain is 9/10. When hangs upside down x few min on inversion table, L lumbar radicular pain is better x 1 hr. Has less sweating with tramadol that was rx'd last visit.   Does not feel Mood is nl vs mildly hypomanic  /80 (Site: Right Arm, Position: Sitting, Cuff Size: Large Adult)   Pulse 98   Wt 220 lb (99.8 kg)   SpO2 94%   BMI 31.12 kg/m²     Assessment:            Plan:      James Llanos was seen today for medication check. Diagnoses and all orders for this visit:    Hypogonadism in male  -     sildenafil (VIAGRA) 100 MG tablet; Take 1 tablet by mouth as needed for Erectile Dysfunction, to replace cialis  -     Testosterone Free and Total Male, on axeron. Risks of testosterone reviewed. -     External Referral To Urology in [place]    Erectile dysfunction due to arterial insufficiency  -     sildenafil (VIAGRA) 100 MG tablet; Take 1 tablet by mouth as needed for Erectile Dysfunction  -     Testosterone Free and Total Male  -     External Referral To Urology    Ulcerative pancolitis with rectal bleeding (HCC)  -     CBC   Pt to ideally eliminate nsaids. Mixed hyperlipidemia  -     Lipid Panel, off meds x 3 wks, 70 lb wt loss    Chronic pain syndrome  -     HYDROcodone-acetaminophen (NORCO) 5-325 MG per tablet; Take 1 tablet by mouth 4 times daily as needed for Pain  Fill 11/5/17. Lumbar radiculopathy  -     HYDROcodone-acetaminophen (NORCO) 5-325 MG per tablet; Take 1 tablet by mouth 4 times daily as needed for Pain  Fill 11/5/17. Benign essential HTN   Controlled w/o losartan x 3 wks. Ok to hold. Chronic insomnia   Sleep is erratic, 2/2 being off lunesta but sx could be developing ryan.   To see psychiatrist for routine appt in Dec.

## 2017-11-10 ENCOUNTER — NURSE ONLY (OUTPATIENT)
Dept: FAMILY MEDICINE CLINIC | Age: 62
End: 2017-11-10

## 2017-11-10 DIAGNOSIS — E29.1 HYPOGONADISM IN MALE: Primary | ICD-10-CM

## 2017-11-10 NOTE — PROGRESS NOTES
Patient came into the office per physician's request for the following blood test(s): testosterone free and total male.       Blood drawn in office by     # of tubes sent:   1 sst

## 2017-11-13 ENCOUNTER — OFFICE VISIT (OUTPATIENT)
Dept: ORTHOPEDIC SURGERY | Age: 62
End: 2017-11-13

## 2017-11-13 VITALS
BODY MASS INDEX: 30.8 KG/M2 | DIASTOLIC BLOOD PRESSURE: 101 MMHG | SYSTOLIC BLOOD PRESSURE: 137 MMHG | HEART RATE: 106 BPM | WEIGHT: 220.02 LBS | HEIGHT: 71 IN

## 2017-11-13 DIAGNOSIS — M54.40 CHRONIC LOW BACK PAIN WITH SCIATICA, SCIATICA LATERALITY UNSPECIFIED, UNSPECIFIED BACK PAIN LATERALITY: Primary | ICD-10-CM

## 2017-11-13 DIAGNOSIS — M54.2 CERVICAL PAIN: ICD-10-CM

## 2017-11-13 DIAGNOSIS — Z98.890 S/P DISCECTOMY: ICD-10-CM

## 2017-11-13 DIAGNOSIS — G89.29 CHRONIC LOW BACK PAIN WITH SCIATICA, SCIATICA LATERALITY UNSPECIFIED, UNSPECIFIED BACK PAIN LATERALITY: Primary | ICD-10-CM

## 2017-11-13 DIAGNOSIS — M50.30 DDD (DEGENERATIVE DISC DISEASE), CERVICAL: ICD-10-CM

## 2017-11-13 DIAGNOSIS — M51.36 DDD (DEGENERATIVE DISC DISEASE), LUMBAR: ICD-10-CM

## 2017-11-13 PROCEDURE — 99244 OFF/OP CNSLTJ NEW/EST MOD 40: CPT | Performed by: PHYSICAL MEDICINE & REHABILITATION

## 2017-11-13 NOTE — PROGRESS NOTES
New Patient: SPINE    CHIEF COMPLAINT:    Chief Complaint   Patient presents with    Back Pain    Neck Pain    Leg Pain       HISTORY OF PRESENT ILLNESS:                The patient is a 63 y.o. male history of bipolar disorder, chronic back pain seen in consultation for spine pain by Phylicia Barrios MD      He has a long history of back pain status post L4 5 discectomy 2007. Back pain been worse since the summer with new recurrent radiating pain in bilateral legs left greater than right. He describes a hot sensation of burning over his left lateral thigh mainly to the knee. It is worse with standing. Back pains worsen leg pain    He also has chronic neck pain but worse also since the summer mainly in the left neck without referred pain in his arms. His referred pain to his occiput put on the left    Past Medical History:   Diagnosis Date    Bipolar 2 disorder ([facility] 8/12    Chronic back pain     DM2 (diabetes mellitus, type 2) (HCC)     Elevated LFTs     HLD (hyperlipidemia)     HTN (hypertension)     Tachycardia     Tobacco use     [facility]     Vitamin D deficiency 4/13          Pain Assessment  Location of Pain: Back  Severity of Pain: 5  Quality of Pain: Aching, Dull (stiff)  Duration of Pain: Persistent  Frequency of Pain: Intermittent  Limiting Behavior: Yes  Relieving Factors: Other (Comment), Ice (pain meds)  Result of Injury: No  Work-Related Injury: No  Are there other pain locations you wish to document?: No    The pain assessment was noted & reviewed in the medical record today.      Current/Past Treatment:   · Physical Therapy: Past  · Chiropractic:     · Injection:   Intra-articular knee injections bilaterally with some relief; remote epidural injections  · Medications:   Gabapentin 800 t.i.d., Norco 5 mg q.i.d., Wellbutrin; Pred taper the summer ×1  · Surgery/Consult:    Work Status/Functionality: Retired    Past Medical History: Medical history form was reviewed today & scanned into the media tab  Past Medical History:   Diagnosis Date    Bipolar 2 disorder ([facility] [address] 8/12    Chronic back pain     DM2 (diabetes mellitus, type 2) ([facility] [address]     Elevated LFTs     HLD (hyperlipidemia)     HTN (hypertension)     Tachycardia     Tobacco use     Ulcerative colitis ([facility] [address]     Vitamin D deficiency 4/13      Past Surgical History:     Past Surgical History:   Procedure Laterality Date    LUMBAR SPINE SURGERY  2007    NERVE BLOCK  2005    lumbar spine     Current Medications:     Current Outpatient Prescriptions:     sildenafil (VIAGRA) 100 MG tablet, Take 1 tablet by mouth as needed for Erectile Dysfunction, Disp: 30 tablet, Rfl: 1    HYDROcodone-acetaminophen (NORCO) 5-325 MG per tablet, Take 1 tablet by mouth 4 times daily as needed for Pain  Fill 11/5/17., Disp: 120 tablet, Rfl: 0    Glucose Blood (BLOOD GLUCOSE TEST STRIPS) STRP, One touch ultra 2;Checks blood sugar bid, Disp: 100 strip, Rfl: 5    Empagliflozin-Linagliptin (GLYXAMBI) 25-5 MG TABS, Take 1 tablet by mouth daily, Disp: 30 tablet, Rfl: 5    metFORMIN (GLUCOPHAGE) 1000 MG tablet, Take 1 tablet by mouth 2 times daily (with meals), Disp: 60 tablet, Rfl: 5    tadalafil (CIALIS) 20 MG tablet, Take 1 tablet by mouth as needed for Erectile Dysfunction, Disp: 6 tablet, Rfl: 5    losartan-hydrochlorothiazide (HYZAAR) 100-12.5 MG per tablet, Take 1 tablet by mouth daily, Disp: 30 tablet, Rfl: 3    omeprazole (PRILOSEC) 40 MG delayed release capsule, Take 1 capsule by mouth daily, Disp: 30 capsule, Rfl: 3    Testosterone (AXIRON) 30 MG/ACT SOLN, Apply 1 pump to each underarm every morning, Disp: 1 Bottle, Rfl: 5    diclofenac sodium (VOLTAREN) 1 % GEL, APPLY 4 GRAMS TOPICALLY 4 TIMES DAILY, Disp: 500 g, Rfl: 5    fenofibrate 160 MG tablet, TAKE 1 TABLET BY MOUTH DAILY, Disp: 30 tablet, Rfl: 3    gabapentin (NEURONTIN) 800 MG tablet, TAKE 1 TABLET BY MOUTH THREE TIMES A DAY, Disp: 90 tablet, Rfl: 3    pravastatin (PRAVACHOL) 80 MG tablet, TAKE 1 TABLET BY MOUTH DAILY, Disp: 30 tablet, Rfl: 3    eszopiclone (ESZOPICLONE) 3 MG TABS, Take 1 tablet by mouth nightly as needed (insomnia), Disp: 30 tablet, Rfl: 3    buPROPion (WELLBUTRIN XL) 300 MG XL tablet, Take 1 tablet by mouth every morning, Disp: 90 tablet, Rfl: 1    Cholecalciferol (VITAMIN D) 2000 UNITS CAPS capsule, Take  by mouth 2 times daily. , Disp: , Rfl:   Allergies:  Nsaids  Social History:    reports that he quit smoking about 2 years ago. He has a 37.50 pack-year smoking history. He has never used smokeless tobacco.  Family History:   No family history on file. REVIEW OF SYSTEMS: Full ROS noted & scanned   CONSTITUTIONAL: Denies unexplained weight loss, fevers, chills or fatigue  NEUROLOGICAL: Denies unsteady gait or progressive weakness  MUSCULOSKELETAL: Denies joint swelling or redness  PSYCHOLOGICAL: Denies anxiety, depression   SKIN: Denies skin changes, delayed healing, rash, itching   HEMATOLOGIC: Denies easy bleeding or bruising  ENDOCRINE: Denies excessive thirst, urination, heat/cold  RESPIRATORY: Denies current dyspnea, cough  GI: Denies nausea, vomiting, diarrhea   : Denies bowel or bladder issues       PHYSICAL EXAM:    Vitals: Blood pressure (!) 137/101, pulse 106, height 5' 10.51\" (1.791 m), weight 220 lb 0.3 oz (99.8 kg). GENERAL EXAM:  · General Apparence: Patient is adequately groomed with no evidence of malnutrition. · Orientation: The patient is oriented to time, place and person. · Mood & Affect:The patient's mood and affect are appropriate   · Vascular: Examination reveals no swelling tenderness in upper or lower extremities.  Good capillary refill  · Lymphatic: The lymphatic examination bilaterally reveals all areas to be without enlargement or induration  · Sensation: Sensation is intact without deficit  · Coordination/Balance: Good coordination     CERVICAL EXAMINATION:  · Inspection: Local inspection shows no step-off or patellar and ankle tendons. Clonus absent bilaterally at the feet. · Gait & station: Normal gait  · Additional Examinations:   · RIGHT LOWER EXTREMITY: Inspection/examination of the right lower extremity does not show any tenderness, deformity or injury. Range of motion is full. There is no gross instability. There are no rashes, ulcerations or lesions. Strength and tone are normal.  ·   · LEFT LOWER EXTREMITY:  Inspection/examination of the left lower extremity does not show any tenderness, deformity or injury. Range of motion is full. There is no gross instability. There are no rashes, ulcerations or lesions.   Strength and tone are normal.    Diagnostic Testing:      November 13, 2017 4 views lumbar spine AP lateral flexion-extension: Advanced DDD L4 5 greater than L5-S1, no instability with flexion and extension    November 13, 2017 2 views cervical spine AP and lateral: DDD C5 6, C6 7: C6 inferior endplate anterior spurring    2015 outside lumbar MRI reviewed showing advanced lumbar DDD, left foraminal disc bulging and significant bilateral L4 5 foraminal stenosis    Impression:    Cervical DDD spondylosis chronic neck pain with subacute aggravation  Lumbar DDD status post laminectomy L4 5 and left L4 radiculitis  Chronic opioid maintenance through PCP      Plan:     MRI lumbar spine with contrast  Physical therapy for neck and back  Follow-up after MRI consider left L4 5 transforaminal epidural    F Dolores Camacho

## 2017-11-14 LAB
SEX HORMONE BINDING GLOBULIN: 76 NMOL/L (ref 11–80)
TESTOSTERONE FREE-NONMALE: 108.6 PG/ML (ref 47–244)
TESTOSTERONE TOTAL: 854 NG/DL (ref 220–1000)

## 2017-11-20 ENCOUNTER — TELEPHONE (OUTPATIENT)
Dept: ORTHOPEDIC SURGERY | Age: 62
End: 2017-11-20

## 2017-11-29 ENCOUNTER — HOSPITAL ENCOUNTER (OUTPATIENT)
Dept: OTHER | Age: 62
Discharge: HOME | End: 2017-11-30

## 2017-11-30 ENCOUNTER — OFFICE VISIT (OUTPATIENT)
Dept: FAMILY MEDICINE CLINIC | Age: 62
End: 2017-11-30

## 2017-11-30 DIAGNOSIS — E29.1 HYPOGONADISM IN MALE: ICD-10-CM

## 2017-11-30 DIAGNOSIS — G89.4 CHRONIC PAIN SYNDROME: ICD-10-CM

## 2017-11-30 DIAGNOSIS — M54.2 NECK PAIN: ICD-10-CM

## 2017-11-30 DIAGNOSIS — E11.9 CONTROLLED TYPE 2 DIABETES MELLITUS WITHOUT COMPLICATION, WITHOUT LONG-TERM CURRENT USE OF INSULIN (HCC): Primary | ICD-10-CM

## 2017-11-30 DIAGNOSIS — M54.16 LUMBAR RADICULOPATHY: ICD-10-CM

## 2017-11-30 DIAGNOSIS — E78.2 MIXED HYPERLIPIDEMIA: ICD-10-CM

## 2017-11-30 DIAGNOSIS — K21.9 GASTROESOPHAGEAL REFLUX DISEASE WITHOUT ESOPHAGITIS: ICD-10-CM

## 2017-11-30 PROCEDURE — 36415 COLL VENOUS BLD VENIPUNCTURE: CPT | Performed by: FAMILY MEDICINE

## 2017-11-30 PROCEDURE — 99214 OFFICE O/P EST MOD 30 MIN: CPT | Performed by: FAMILY MEDICINE

## 2017-11-30 RX ORDER — HYDROCODONE BITARTRATE AND ACETAMINOPHEN 5; 325 MG/1; MG/1
1 TABLET ORAL 4 TIMES DAILY PRN
Qty: 120 TABLET | Refills: 0 | Status: SHIPPED | OUTPATIENT
Start: 2017-11-30 | End: 2018-02-05 | Stop reason: SDUPTHER

## 2017-11-30 RX ORDER — PRAVASTATIN SODIUM 80 MG/1
TABLET ORAL
Qty: 30 TABLET | Refills: 5 | Status: SHIPPED | OUTPATIENT
Start: 2017-11-30 | End: 2018-05-07 | Stop reason: ALTCHOICE

## 2017-11-30 RX ORDER — CYCLOBENZAPRINE HCL 10 MG
10 TABLET ORAL 3 TIMES DAILY PRN
Qty: 60 TABLET | Refills: 2 | Status: SHIPPED | OUTPATIENT
Start: 2017-11-30 | End: 2018-02-05

## 2017-11-30 RX ORDER — OMEPRAZOLE 40 MG/1
40 CAPSULE, DELAYED RELEASE ORAL DAILY
Qty: 30 CAPSULE | Refills: 3 | Status: SHIPPED | OUTPATIENT
Start: 2017-11-30 | End: 2018-05-02 | Stop reason: SDUPTHER

## 2017-11-30 ASSESSMENT — PATIENT HEALTH QUESTIONNAIRE - PHQ9
SUM OF ALL RESPONSES TO PHQ QUESTIONS 1-9: 0
1. LITTLE INTEREST OR PLEASURE IN DOING THINGS: 0
SUM OF ALL RESPONSES TO PHQ9 QUESTIONS 1 & 2: 0
2. FEELING DOWN, DEPRESSED OR HOPELESS: 0

## 2017-11-30 NOTE — PROGRESS NOTES
Subjective:      Patient ID: Adeola Bird is a 63 y.o. male. HPI  Chief Complaint   Patient presents with   Rohit Brizuela, [facility]. Pt was told to retry cialis on empty stomach, helpful. Pt then bought generic sildenafil 20 mg from urology office, was told he can take up to 5 (100 mg). Pt finds just 1 pill on empty stomach is effective since testosterone level has normalized with Axeron. He was also told to increase aerobic exercise. Pt bought indoor bike, has to put together. Pt cont's with Kateryna Elizondo, as rec'd by Dr Stephanie Ellis. Has been using lunesta just 1x per 2 wks. Since no longer working, has less anxiety and less insomnia. Sleeps when sleepy, not necessarily when nightfall. Is back on metformin and glyxambi. No sx' or high or low bs.  on avg. Has maintained 90 lb wt loss. Has not been able to eliminate ibu 2/2 chronic pain, though ibu flares his UC. Is on ibu 200 mg 1 qid and UC remains flared. Pt cont's with PT twice weekly ordered by Dr Alexi Castle for cervical DDD and lumbar DDD with L L4 radiculitis, has been once so far. Pt is willing to see GI once PT is completed. Pt is doing HEP neck stretch, helpful. Pt was also given exercise where he lies prone, pushes up on UEs and extends back. Dr Laura Colin has also ordered lumbar MRI. Dr VENEGAS said ANGELICA will buy time, though he will eventually need surgery. Pt not sure if he will proceed. To see Dr Alie Manuel psychiatrist, in Dec, though pt feels he may not need to return. Pt will cont to see psychologist in his office q 3 mo and will see psych only prn. [place] flat with seroquel and Dr Kiara Suh d/c'd and increased wellbutrin, which pt stopped 3 mo ago as it did not help mood. Lately feels mood is stable and positive. Girlfriend from [place] will visit for holidays. Has been back on prav but is not yet back on fenofibrate. Has 1/4/18 appt, will tan lipids then. Requests rf flexeril for neck pain.     Review of Systems syndrome  -     HYDROcodone-acetaminophen (NORCO) 5-325 MG per tablet; Take 1 tablet by mouth 4 times daily as needed for Pain  Fill 11/5/17. Earliest Fill Date: 11/30/17, rf    Lumbar radiculopathy  -     HYDROcodone-acetaminophen (NORCO) 5-325 MG per tablet; Take 1 tablet by mouth 4 times daily as needed for Pain  Fill 11/5/17. Earliest Fill Date: 11/30/17, rf    Neck pain  -     cyclobenzaprine (FLEXERIL) 10 MG tablet; Take 1 tablet by mouth 3 times daily as needed for Muscle spasms, rf    Hypogonadism in male   Cont axeron and generic viagra. Will call [facility] to PA Axiron.

## 2017-12-01 ENCOUNTER — TELEPHONE (OUTPATIENT)
Dept: FAMILY MEDICINE CLINIC | Age: 62
End: 2017-12-01

## 2017-12-01 ENCOUNTER — HOSPITAL ENCOUNTER (OUTPATIENT)
Dept: OTHER | Age: 62
Discharge: HOME | End: 2017-12-31

## 2017-12-01 VITALS
HEART RATE: 93 BPM | DIASTOLIC BLOOD PRESSURE: 98 MMHG | RESPIRATION RATE: 16 BRPM | SYSTOLIC BLOOD PRESSURE: 134 MMHG | OXYGEN SATURATION: 96 % | HEIGHT: 71 IN | WEIGHT: 223.6 LBS | BODY MASS INDEX: 31.3 KG/M2

## 2017-12-01 LAB
ESTIMATED AVERAGE GLUCOSE: 108.3 MG/DL
HBA1C MFR BLD: 5.4 %

## 2017-12-01 ASSESSMENT — ENCOUNTER SYMPTOMS
CHEST TIGHTNESS: 0
DIARRHEA: 1
SHORTNESS OF BREATH: 0
RECTAL PAIN: 0
WHEEZING: 0
ANAL BLEEDING: 1
COUGH: 0
NAUSEA: 0
VOMITING: 0
BLOOD IN STOOL: 1

## 2017-12-01 NOTE — TELEPHONE ENCOUNTER
Spoke with Christofer Dee. At [employer] Didi is now approved. Pt is Ok to RF next rx today, though pt states he will not RF until 12/19/17. Pls let pt know that PA has been completed.  Thx.

## 2017-12-06 DIAGNOSIS — M54.40 CHRONIC LOW BACK PAIN WITH SCIATICA, SCIATICA LATERALITY UNSPECIFIED, UNSPECIFIED BACK PAIN LATERALITY: Primary | ICD-10-CM

## 2017-12-06 DIAGNOSIS — G89.29 CHRONIC LOW BACK PAIN WITH SCIATICA, SCIATICA LATERALITY UNSPECIFIED, UNSPECIFIED BACK PAIN LATERALITY: Primary | ICD-10-CM

## 2017-12-07 ENCOUNTER — HOSPITAL ENCOUNTER (OUTPATIENT)
Dept: MRI IMAGING | Age: 62
Discharge: HOME | End: 2017-12-07
Attending: PHYSICAL MEDICINE & REHABILITATION | Admitting: PHYSICAL MEDICINE & REHABILITATION

## 2017-12-07 DIAGNOSIS — M50.30 DDD (DEGENERATIVE DISC DISEASE), CERVICAL: ICD-10-CM

## 2017-12-07 DIAGNOSIS — Z98.890 S/P DISCECTOMY: ICD-10-CM

## 2017-12-07 DIAGNOSIS — M54.2 CERVICAL PAIN: ICD-10-CM

## 2017-12-07 DIAGNOSIS — M54.40 CHRONIC LOW BACK PAIN WITH SCIATICA, SCIATICA LATERALITY UNSPECIFIED, UNSPECIFIED BACK PAIN LATERALITY: ICD-10-CM

## 2017-12-07 DIAGNOSIS — M51.36 DDD (DEGENERATIVE DISC DISEASE), LUMBAR: ICD-10-CM

## 2017-12-07 DIAGNOSIS — M54.40 LUMBAGO WITH SCIATICA: ICD-10-CM

## 2017-12-07 DIAGNOSIS — G89.29 CHRONIC LOW BACK PAIN WITH SCIATICA, SCIATICA LATERALITY UNSPECIFIED, UNSPECIFIED BACK PAIN LATERALITY: ICD-10-CM

## 2017-12-07 LAB
CREAT SERPL-MCNC: 1 MG/DL (ref 0.8–1.3)
GFR AFRICAN AMERICAN: >60
GFR NON-AFRICAN AMERICAN: >60

## 2017-12-07 RX ORDER — SODIUM CHLORIDE 0.9 % (FLUSH) 0.9 %
10 SYRINGE (ML) INJECTION ONCE
Status: COMPLETED | OUTPATIENT
Start: 2017-12-07 | End: 2017-12-07

## 2017-12-07 RX ADMIN — Medication 10 ML: at 14:02

## 2018-01-01 ENCOUNTER — HOSPITAL ENCOUNTER (OUTPATIENT)
Dept: OTHER | Age: 63
Discharge: HOME | End: 2018-01-31

## 2018-01-04 ENCOUNTER — OFFICE VISIT (OUTPATIENT)
Dept: FAMILY MEDICINE CLINIC | Age: 63
End: 2018-01-04

## 2018-01-04 VITALS
OXYGEN SATURATION: 96 % | DIASTOLIC BLOOD PRESSURE: 70 MMHG | HEART RATE: 93 BPM | BODY MASS INDEX: 32.24 KG/M2 | WEIGHT: 228 LBS | SYSTOLIC BLOOD PRESSURE: 132 MMHG

## 2018-01-04 DIAGNOSIS — M54.16 LUMBAR RADICULOPATHY: ICD-10-CM

## 2018-01-04 DIAGNOSIS — K51.811 OTHER ULCERATIVE COLITIS WITH RECTAL BLEEDING (HCC): ICD-10-CM

## 2018-01-04 DIAGNOSIS — R59.1 LYMPHADENOPATHY: Primary | ICD-10-CM

## 2018-01-04 DIAGNOSIS — E11.9 CONTROLLED TYPE 2 DIABETES MELLITUS WITHOUT COMPLICATION, WITHOUT LONG-TERM CURRENT USE OF INSULIN (HCC): ICD-10-CM

## 2018-01-04 DIAGNOSIS — N28.1 KIDNEY CYSTS: ICD-10-CM

## 2018-01-04 PROCEDURE — 99214 OFFICE O/P EST MOD 30 MIN: CPT | Performed by: FAMILY MEDICINE

## 2018-01-04 RX ORDER — PREDNISONE 20 MG/1
TABLET ORAL
Qty: 20 TABLET | Refills: 0 | Status: SHIPPED | OUTPATIENT
Start: 2018-01-04 | End: 2018-01-14

## 2018-01-04 RX ORDER — OXYCODONE HYDROCHLORIDE AND ACETAMINOPHEN 5; 325 MG/1; MG/1
1 TABLET ORAL 4 TIMES DAILY
Status: DISCONTINUED | OUTPATIENT
Start: 2018-01-04 | End: 2018-01-04

## 2018-01-04 RX ORDER — OXYCODONE AND ACETAMINOPHEN 10; 325 MG/1; MG/1
1 TABLET ORAL 4 TIMES DAILY PRN
Qty: 120 TABLET | Refills: 0 | Status: SHIPPED | OUTPATIENT
Start: 2018-01-04 | End: 2018-02-03

## 2018-01-04 RX ORDER — ONDANSETRON 4 MG/1
4 TABLET, ORALLY DISINTEGRATING ORAL EVERY 8 HOURS PRN
Qty: 20 TABLET | Refills: 0 | Status: SHIPPED | OUTPATIENT
Start: 2018-01-04 | End: 2018-10-18 | Stop reason: ALTCHOICE

## 2018-01-05 ASSESSMENT — ENCOUNTER SYMPTOMS
SHORTNESS OF BREATH: 0
BACK PAIN: 0

## 2018-01-08 ENCOUNTER — OFFICE VISIT (OUTPATIENT)
Dept: ORTHOPEDIC SURGERY | Age: 63
End: 2018-01-08

## 2018-01-08 VITALS
WEIGHT: 227.96 LBS | DIASTOLIC BLOOD PRESSURE: 110 MMHG | SYSTOLIC BLOOD PRESSURE: 155 MMHG | BODY MASS INDEX: 31.91 KG/M2 | HEIGHT: 71 IN | HEART RATE: 92 BPM

## 2018-01-08 DIAGNOSIS — M48.062 SPINAL STENOSIS OF LUMBAR REGION WITH NEUROGENIC CLAUDICATION: Primary | ICD-10-CM

## 2018-01-08 PROCEDURE — 99214 OFFICE O/P EST MOD 30 MIN: CPT | Performed by: PHYSICAL MEDICINE & REHABILITATION

## 2018-01-08 NOTE — PROGRESS NOTES
Follow up: SPINE    CHIEF COMPLAINT:    Chief Complaint   Patient presents with    Back Pain     F/u MRI       HISTORY OF PRESENT ILLNESS:                The patient is a 63 y.o. male follow up a lumbar MRI from November 13, 2017. History of bipolar disorder, chronic back pain and lumbar discectomy L4 5 in 2007. Worsening back pain left greater than right bilateral radiating leg pain since December 2017. He describes pain mostly on the left and the buttock posterior thigh was or lateral calf into the ankle. Symptoms can occur on the right. Worse with standing for more than a few minutes. Better with laying. No progressive weakness. Pain Assessment  Location of Pain: Back  Location Modifiers: Left, Right (rt 6/10  lt 7/10)  Severity of Pain: 3  Quality of Pain: Aching  Duration of Pain: Persistent  Frequency of Pain: Constant  Aggravating Factors: Stretching  Limiting Behavior: Yes  Relieving Factors:  Other (Comment) (pain meds)  Result of Injury: No  Work-Related Injury: No  Are there other pain locations you wish to document?: No      Past/Current Treatment     PT: ×4-5 visits with aggravation with HEP  Chiro:  Injections:   Medications: Gabapentin 800 t.i.d., hydrocodone past, Percocet through PCP; Pred taper summer 2017  Surgery:     Past Medical History: Medical history form was reviewed and scanned into the Media tab  Past Medical History:   Diagnosis Date    Bipolar 2 disorder ([address] 8/12    Chronic back pain     DDD (degenerative disc disease), cervical 11/13/2017    per Calos Perrin DDD (degenerative disc disease), lumbar     11/17 xrays    DM2 (diabetes mellitus, type 2) ([address]     Elevated LFTs     HLD (hyperlipidemia)     HTN (hypertension)     Tachycardia     Tobacco use     Ulcerative colitis ([address]     Vitamin D deficiency 4/13        REVIEW OF SYSTEMS:   CONSTITUTIONAL: Denies unexplained weight loss, fevers, chills or fatigue  NEUROLOGIC: Denies tremors or seizures         PHYSICAL

## 2018-01-15 ENCOUNTER — TELEPHONE (OUTPATIENT)
Dept: FAMILY MEDICINE CLINIC | Age: 63
End: 2018-01-15

## 2018-01-15 DIAGNOSIS — Z13.9 SCREENING FOR CONDITION: Primary | ICD-10-CM

## 2018-01-17 ENCOUNTER — TELEPHONE (OUTPATIENT)
Dept: ORTHOPEDIC SURGERY | Age: 63
End: 2018-01-17

## 2018-01-19 ENCOUNTER — NURSE ONLY (OUTPATIENT)
Dept: FAMILY MEDICINE CLINIC | Age: 63
End: 2018-01-19

## 2018-01-19 DIAGNOSIS — Z13.9 SCREENING FOR CONDITION: Primary | ICD-10-CM

## 2018-01-19 PROCEDURE — 36415 COLL VENOUS BLD VENIPUNCTURE: CPT | Performed by: FAMILY MEDICINE

## 2018-01-20 LAB
BUN BLDV-MCNC: 17 MG/DL (ref 7–20)
CREAT SERPL-MCNC: 0.9 MG/DL (ref 0.8–1.3)
GFR AFRICAN AMERICAN: >60
GFR NON-AFRICAN AMERICAN: >60

## 2018-01-23 ENCOUNTER — HOSPITAL ENCOUNTER (OUTPATIENT)
Dept: PAIN MANAGEMENT | Age: 63
Discharge: HOME | End: 2018-01-23
Attending: PHYSICAL MEDICINE & REHABILITATION | Admitting: PHYSICAL MEDICINE & REHABILITATION

## 2018-01-23 VITALS
OXYGEN SATURATION: 98 % | SYSTOLIC BLOOD PRESSURE: 146 MMHG | RESPIRATION RATE: 16 BRPM | DIASTOLIC BLOOD PRESSURE: 103 MMHG | HEIGHT: 70 IN | HEART RATE: 100 BPM | TEMPERATURE: 97.6 F | BODY MASS INDEX: 29.2 KG/M2 | WEIGHT: 204 LBS

## 2018-01-23 LAB
GLUCOSE BLD-MCNC: 109 MG/DL (ref 70–99)
PERFORMED ON: ABNORMAL

## 2018-01-23 RX ORDER — FLUTICASONE PROPIONATE 50 MCG
2 SPRAY, SUSPENSION (ML) NASAL DAILY
COMMUNITY
End: 2018-09-09 | Stop reason: SDUPTHER

## 2018-01-23 RX ORDER — BUPROPION HYDROCHLORIDE 150 MG/1
150 TABLET ORAL EVERY MORNING
COMMUNITY
End: 2020-12-21 | Stop reason: SDUPTHER

## 2018-01-23 RX ORDER — BUPROPION HYDROCHLORIDE 300 MG/1
300 TABLET ORAL EVERY MORNING
COMMUNITY
End: 2020-12-21 | Stop reason: SDUPTHER

## 2018-01-23 ASSESSMENT — PAIN - FUNCTIONAL ASSESSMENT: PAIN_FUNCTIONAL_ASSESSMENT: 0-10

## 2018-01-24 NOTE — OP NOTE
Patient:  Mansi Boudreaux Record #:  [id number]   Date:  1-23-18  Physician:  Candido Reeder M.D. Facility: [facility]       Pre-op diagnosis: Lumbar radiculitis, lumbar spondylosis, lumbar stenosis  Post-op diagnosis:  same  Procedure: Bilateral L4 5 transforaminal epidural injection #1 with flouroscopic guidance     Procedure Note:    The patient was admitted through pre-op and written consent was obtained. The patient was advised of the risks and benefits of the procedure, including but not limited to the following: bleeding, pain, infection, temporary paralysis, nerve damage and spinal headache. The patient was given the opportunity to ask questions. There were no contraindications for this procedure. The appropriate area was prepped and draped in a sterile fashion. Landmarks were identified and marked. A 23G spinal needle was advanced to the right L4 neural foramen using fluoroscopic guidance with ideal needle tip placement confirmed by multiple views. Injection of contrast showed epidural flow. There were no signs of intravascular or intrathecal injection. 40 mg depomedrol and 1cc 1% lidocaine were then injected per site. There were no complications and the patient tolerated the procedure well. The patient was transferred to the recovery area and monitored. Discharge instructions were given. The patient is to contact me for any post-procedure concerns. The patient is to follow up as scheduled. The same procedure was performed on the left side.     Candido Reeder MD

## 2018-01-25 ENCOUNTER — TELEPHONE (OUTPATIENT)
Dept: FAMILY MEDICINE CLINIC | Age: 63
End: 2018-01-25

## 2018-01-25 ENCOUNTER — HOSPITAL ENCOUNTER (OUTPATIENT)
Dept: CT IMAGING | Age: 63
Discharge: HOME | End: 2018-01-25
Attending: FAMILY MEDICINE | Admitting: FAMILY MEDICINE

## 2018-01-25 DIAGNOSIS — R59.1 LYMPHADENOPATHY: ICD-10-CM

## 2018-01-25 DIAGNOSIS — R59.1 GENERALIZED ENLARGED LYMPH NODES: ICD-10-CM

## 2018-01-25 DIAGNOSIS — N28.1 KIDNEY CYSTS: ICD-10-CM

## 2018-02-05 ENCOUNTER — TELEPHONE (OUTPATIENT)
Dept: FAMILY MEDICINE CLINIC | Age: 63
End: 2018-02-05

## 2018-02-05 ENCOUNTER — OFFICE VISIT (OUTPATIENT)
Dept: FAMILY MEDICINE CLINIC | Age: 63
End: 2018-02-05

## 2018-02-05 VITALS
SYSTOLIC BLOOD PRESSURE: 130 MMHG | WEIGHT: 208 LBS | BODY MASS INDEX: 29.12 KG/M2 | HEIGHT: 71 IN | DIASTOLIC BLOOD PRESSURE: 75 MMHG | OXYGEN SATURATION: 98 % | TEMPERATURE: 97.7 F | HEART RATE: 111 BPM

## 2018-02-05 DIAGNOSIS — J34.89 RHINORRHEA: ICD-10-CM

## 2018-02-05 DIAGNOSIS — M17.0 PRIMARY OSTEOARTHRITIS OF BOTH KNEES: ICD-10-CM

## 2018-02-05 DIAGNOSIS — M19.071 PRIMARY OSTEOARTHRITIS OF RIGHT ANKLE: ICD-10-CM

## 2018-02-05 DIAGNOSIS — M54.16 LUMBAR RADICULOPATHY: ICD-10-CM

## 2018-02-05 DIAGNOSIS — Z86.39 HISTORY OF DIABETES MELLITUS, TYPE II: Primary | ICD-10-CM

## 2018-02-05 DIAGNOSIS — E78.2 MIXED HYPERLIPIDEMIA: ICD-10-CM

## 2018-02-05 DIAGNOSIS — M54.16 LUMBAR RADICULOPATHY: Primary | ICD-10-CM

## 2018-02-05 DIAGNOSIS — G89.4 CHRONIC PAIN SYNDROME: ICD-10-CM

## 2018-02-05 DIAGNOSIS — M62.838 NECK MUSCLE SPASM: ICD-10-CM

## 2018-02-05 LAB
ALT SERPL-CCNC: 14 U/L (ref 10–40)
CHOLESTEROL, TOTAL: 173 MG/DL (ref 0–199)
HDLC SERPL-MCNC: 53 MG/DL (ref 40–60)
LDL CHOLESTEROL CALCULATED: 89 MG/DL
TRIGL SERPL-MCNC: 157 MG/DL (ref 0–150)
VLDLC SERPL CALC-MCNC: 31 MG/DL

## 2018-02-05 PROCEDURE — 99214 OFFICE O/P EST MOD 30 MIN: CPT | Performed by: FAMILY MEDICINE

## 2018-02-05 PROCEDURE — 36415 COLL VENOUS BLD VENIPUNCTURE: CPT | Performed by: FAMILY MEDICINE

## 2018-02-05 RX ORDER — IPRATROPIUM BROMIDE 42 UG/1
SPRAY, METERED NASAL
Qty: 1 BOTTLE | Refills: 5 | Status: SHIPPED | OUTPATIENT
Start: 2018-02-05 | End: 2018-11-12 | Stop reason: SDUPTHER

## 2018-02-05 RX ORDER — CYCLOBENZAPRINE HCL 10 MG
10 TABLET ORAL 3 TIMES DAILY PRN
Qty: 30 TABLET | Refills: 5 | Status: SHIPPED | OUTPATIENT
Start: 2018-02-05 | End: 2018-04-05 | Stop reason: SDUPTHER

## 2018-02-05 RX ORDER — HYDROCODONE BITARTRATE AND ACETAMINOPHEN 5; 325 MG/1; MG/1
1 TABLET ORAL 4 TIMES DAILY PRN
Qty: 120 TABLET | Refills: 0 | Status: SHIPPED | OUTPATIENT
Start: 2018-02-05 | End: 2018-03-05 | Stop reason: SDUPTHER

## 2018-02-05 RX ORDER — METHYLPREDNISOLONE ACETATE 80 MG/ML
80 INJECTION, SUSPENSION INTRA-ARTICULAR; INTRALESIONAL; INTRAMUSCULAR; SOFT TISSUE ONCE
Status: DISCONTINUED | OUTPATIENT
Start: 2018-02-05 | End: 2018-02-05

## 2018-02-05 ASSESSMENT — ENCOUNTER SYMPTOMS
RHINORRHEA: 1
WHEEZING: 0
SHORTNESS OF BREATH: 0
SINUS PRESSURE: 0
SINUS PAIN: 0

## 2018-02-06 LAB
ESTIMATED AVERAGE GLUCOSE: 108.3 MG/DL
HBA1C MFR BLD: 5.4 %

## 2018-02-22 RX ORDER — TESTOSTERONE 30 MG/1.5ML
SOLUTION TOPICAL
Qty: 90 G | Refills: 5 | Status: SHIPPED | OUTPATIENT
Start: 2018-02-22 | End: 2018-09-09 | Stop reason: SDUPTHER

## 2018-02-27 ENCOUNTER — HOSPITAL ENCOUNTER (OUTPATIENT)
Dept: OTHER | Age: 63
Discharge: HOME | End: 2018-02-27

## 2018-02-27 DIAGNOSIS — K51.90 MILD CHRONIC ULCERATIVE COLITIS WITHOUT COMPLICATION (HCC): ICD-10-CM

## 2018-02-28 LAB
C-REACTIVE PROTEIN: 1.2 MG/L (ref 0–5.1)
HBV SURFACE AB TITR SER: <3.5 MIU/ML
HEPATITIS C ANTIBODY INTERPRETATION: NORMAL
SEDIMENTATION RATE, ERYTHROCYTE: 3 MM/HR (ref 0–20)

## 2018-03-01 LAB
HAV AB SERPL IA-ACNC: NEGATIVE
HEPATITIS B CORE TOTAL ANTIBODY: NEGATIVE
QUANTIFERON (R) TB GOLD (INCUBATED): NEGATIVE
QUANTIFERON MITOGEN: >10 IU/ML
QUANTIFERON NIL: 0.04 IU/ML
QUANTIFERON TB AG MINUS NIL: 0 IU/ML (ref 0–0.34)

## 2018-03-05 ENCOUNTER — OFFICE VISIT (OUTPATIENT)
Dept: FAMILY MEDICINE CLINIC | Age: 63
End: 2018-03-05

## 2018-03-05 ENCOUNTER — HOSPITAL ENCOUNTER (OUTPATIENT)
Dept: OTHER | Age: 63
Discharge: HOME | End: 2018-03-05
Attending: SPECIALIST | Admitting: SPECIALIST

## 2018-03-05 VITALS
DIASTOLIC BLOOD PRESSURE: 84 MMHG | BODY MASS INDEX: 30.13 KG/M2 | SYSTOLIC BLOOD PRESSURE: 138 MMHG | WEIGHT: 210 LBS | OXYGEN SATURATION: 98 % | HEART RATE: 97 BPM

## 2018-03-05 DIAGNOSIS — G89.4 CHRONIC PAIN SYNDROME: ICD-10-CM

## 2018-03-05 DIAGNOSIS — R25.1 TREMOR: Primary | ICD-10-CM

## 2018-03-05 DIAGNOSIS — M54.16 LUMBAR RADICULOPATHY: ICD-10-CM

## 2018-03-05 PROCEDURE — 99213 OFFICE O/P EST LOW 20 MIN: CPT | Performed by: FAMILY MEDICINE

## 2018-03-05 RX ORDER — HYDROCODONE BITARTRATE AND ACETAMINOPHEN 5; 325 MG/1; MG/1
1 TABLET ORAL 4 TIMES DAILY PRN
Qty: 120 TABLET | Refills: 0 | Status: SHIPPED | OUTPATIENT
Start: 2018-03-05 | End: 2018-04-05 | Stop reason: SDUPTHER

## 2018-03-05 ASSESSMENT — ENCOUNTER SYMPTOMS
CHEST TIGHTNESS: 0
SHORTNESS OF BREATH: 0

## 2018-03-05 NOTE — PROGRESS NOTES
TEST STRIPS) STRP One touch ultra 2;Checks blood sugar bid 100 strip 5    metFORMIN (GLUCOPHAGE) 1000 MG tablet Take 1 tablet by mouth 2 times daily (with meals) 60 tablet 5    diclofenac sodium (VOLTAREN) 1 % GEL APPLY 4 GRAMS TOPICALLY 4 TIMES DAILY 500 g 5    Cholecalciferol (VITAMIN D) 2000 UNITS CAPS capsule Take  by mouth 2 times daily.  ondansetron (ZOFRAN ODT) 4 MG disintegrating tablet Take 1 tablet by mouth every 8 hours as needed for Nausea or Vomiting 20 tablet 0     Current Facility-Administered Medications   Medication Dose Route Frequency Provider Last Rate Last Dose    MethylPREDNISolone Acetate SUSP 40 mg  40 mg Injection Once Dania Dos Santos MD           Patient's past medical history, surgical history, family history, medications,  and allergies  were all reviewed and updated as appropriate today. Review of Systems   Constitutional: Negative for activity change, appetite change, fatigue and fever. Respiratory: Negative for chest tightness and shortness of breath. Cardiovascular: Negative for chest pain and palpitations. Neurological: Positive for tremors. Negative for dizziness, weakness and light-headedness. Psychiatric/Behavioral: Negative for agitation and dysphoric mood. The patient is not nervous/anxious. Physical Exam   Constitutional: He is oriented to person, place, and time. Neurological: He is alert and oriented to person, place, and time. He has normal reflexes. No cranial nerve deficit. Coordination normal.   Resting and intention tremor noted b/l hands  Nl facies  Nl gait  Nl movement, no cogwheeling at elbows   Psychiatric: He has a normal mood and affect. His behavior is normal. Judgment and thought content normal.         /84 (Site: Left Arm, Position: Sitting, Cuff Size: Medium Adult)   Pulse 97   Wt 210 lb (95.3 kg)   SpO2 98%   BMI 30.13 kg/m²     Assessment/Plan:    Tod Perrin was seen today for 1 month follow-up.     Diagnoses and all

## 2018-03-08 LAB — CALPROTECTIN: 177 UG/G

## 2018-03-19 NOTE — ANESTHESIA PRE-OP
tablet by mouth 2 times daily (with meals) 10/5/17   Yareli Daley MD   diclofenac sodium (VOLTAREN) 1 % GEL APPLY 4 GRAMS TOPICALLY 4 TIMES DAILY 8/7/17   Yareli Daley MD   Cholecalciferol (VITAMIN D) 2000 UNITS CAPS capsule Take  by mouth 2 times daily.     Historical Provider, MD       Current medications:    Current Outpatient Prescriptions   Medication Sig Dispense Refill    HYDROcodone-acetaminophen (NORCO) 5-325 MG per tablet Take 1 tablet by mouth 4 times daily as needed for Pain for up to 30 days Fill 11/5/17. 120 tablet 0    Testosterone 30 MG/ACT SOLN APPLY 1 PUMP TO EACH UNDERARM EVERY MORNING 90 g 5    cyclobenzaprine (FLEXERIL) 10 MG tablet Take 1 tablet by mouth 3 times daily as needed for Muscle spasms 30 tablet 5    ipratropium (ATROVENT) 0.06 % nasal spray 2 sprays bid to b/l nostrils 1 Bottle 5    fluticasone (FLONASE) 50 MCG/ACT nasal spray 2 sprays by Nasal route daily      buPROPion (WELLBUTRIN XL) 150 MG extended release tablet Take 150 mg by mouth every morning      buPROPion (WELLBUTRIN XL) 300 MG extended release tablet Take 300 mg by mouth every morning      BOOSTRIX 5-2.5-18.5 injection       ondansetron (ZOFRAN ODT) 4 MG disintegrating tablet Take 1 tablet by mouth every 8 hours as needed for Nausea or Vomiting 20 tablet 0    pravastatin (PRAVACHOL) 80 MG tablet TAKE 1 TABLET BY MOUTH DAILY 30 tablet 5    omeprazole (PRILOSEC) 40 MG delayed release capsule Take 1 capsule by mouth daily 30 capsule 3    sildenafil (VIAGRA) 100 MG tablet Take 1 tablet by mouth as needed for Erectile Dysfunction 30 tablet 1    Glucose Blood (BLOOD GLUCOSE TEST STRIPS) STRP One touch ultra 2;Checks blood sugar bid 100 strip 5    metFORMIN (GLUCOPHAGE) 1000 MG tablet Take 1 tablet by mouth 2 times daily (with meals) 60 tablet 5    diclofenac sodium (VOLTAREN) 1 % GEL APPLY 4 GRAMS TOPICALLY 4 TIMES DAILY 500 g 5    Cholecalciferol (VITAMIN D) 2000 UNITS CAPS capsule Take  by mouth 2 times

## 2018-03-20 ENCOUNTER — HOSPITAL ENCOUNTER (OUTPATIENT)
Dept: SURGERY | Age: 63
Discharge: HOME | End: 2018-03-20
Attending: SPECIALIST | Admitting: SPECIALIST

## 2018-03-20 VITALS
BODY MASS INDEX: 31.21 KG/M2 | OXYGEN SATURATION: 98 % | SYSTOLIC BLOOD PRESSURE: 134 MMHG | HEART RATE: 86 BPM | TEMPERATURE: 97.5 F | HEIGHT: 70 IN | WEIGHT: 218 LBS | DIASTOLIC BLOOD PRESSURE: 91 MMHG | RESPIRATION RATE: 16 BRPM

## 2018-03-20 LAB — GLUCOSE BLD-MCNC: 116 MG/DL (ref 70–99)

## 2018-03-20 RX ORDER — VITAMIN B COMPLEX
1 CAPSULE ORAL DAILY
COMMUNITY
End: 2018-09-24

## 2018-03-20 RX ORDER — SODIUM CHLORIDE 9 MG/ML
INJECTION, SOLUTION INTRAVENOUS CONTINUOUS
Status: DISCONTINUED | OUTPATIENT
Start: 2018-03-20 | End: 2018-03-21 | Stop reason: HOSPADM

## 2018-03-20 RX ORDER — MULTIVITAMIN,THER AND MINERALS
CAPSULE ORAL
COMMUNITY
End: 2018-09-24

## 2018-03-20 RX ORDER — SODIUM CHLORIDE, SODIUM LACTATE, POTASSIUM CHLORIDE, CALCIUM CHLORIDE 600; 310; 30; 20 MG/100ML; MG/100ML; MG/100ML; MG/100ML
INJECTION, SOLUTION INTRAVENOUS CONTINUOUS
Status: CANCELLED | OUTPATIENT
Start: 2018-03-20

## 2018-03-20 RX ADMIN — SODIUM CHLORIDE: 9 INJECTION, SOLUTION INTRAVENOUS at 09:25

## 2018-03-20 ASSESSMENT — PAIN SCALES - GENERAL
PAINLEVEL_OUTOF10: 0
PAINLEVEL_OUTOF10: 0

## 2018-03-20 ASSESSMENT — PAIN - FUNCTIONAL ASSESSMENT: PAIN_FUNCTIONAL_ASSESSMENT: 0-10

## 2018-03-20 NOTE — ANESTHESIA POST-OP
Anesthesia Post-op Note    Patient: Gian Knutson  MRN: [id number]  YOB: 1955  Date of evaluation: 3/20/2018  Time:  11:17 AM     Procedure(s) Performed: colonoscopy Bx's    Last Vitals: BP (!) 145/103   Pulse 102   Temp 96.9 °F (36.1 °C) (Temporal)   Resp 16   Ht 5' 10\" (1.778 m)   Wt 218 lb (98.9 kg)   SpO2 96%   BMI 31.28 kg/m²     Margarito Phase I: Margarito Score: 10    Margarito Phase II:  10    Anesthesia Post Evaluation    Final anesthesia type: general and TIVA  Patient location during evaluation: procedure area  Patient participation: complete - patient participated  Level of consciousness: awake and alert  Pain score: 0  Airway patency: patent  Nausea & Vomiting: no nausea and no vomiting  Complications: no  Cardiovascular status: hemodynamically stable  Respiratory status: acceptable, nonlabored ventilation, room air and spontaneous ventilation  Hydration status: euvolemic        Marge Elkins CRNA  11:17 AM

## 2018-03-20 NOTE — BRIEF OP NOTE
BRIEF CHROMOENDOSCOPY REPORT:    Impression:    1) no focal lesions seen    2) no active inflammation noted- Vega Score = 0   3) small internal hemorrhoids    4) 3 -5 random biopsies taken from all four segments       Suggest:   1) await biopsies        The complete operative report (including photos) is available in the following locations:   1) soft chart now   2) report will also be scanned and can then be found by going to \"chart review\" then \"notes\" then \"op report\" or by going to \"chart review\" then \"media\" then \"op report\". For review of photos, may need to go to page 2.

## 2018-03-28 ENCOUNTER — TELEPHONE (OUTPATIENT)
Dept: ORTHOPEDIC SURGERY | Age: 63
End: 2018-03-28

## 2018-03-28 NOTE — TELEPHONE ENCOUNTER
[facility] @ [phone] attn: Manuel Herrera,  Last ov note & MRI Report

## 2018-04-05 ENCOUNTER — OFFICE VISIT (OUTPATIENT)
Dept: FAMILY MEDICINE CLINIC | Age: 63
End: 2018-04-05

## 2018-04-05 DIAGNOSIS — J30.5 CHRONIC ALLERGIC RHINITIS DUE TO FOOD: ICD-10-CM

## 2018-04-05 DIAGNOSIS — M62.838 NECK MUSCLE SPASM: ICD-10-CM

## 2018-04-05 DIAGNOSIS — I10 BENIGN ESSENTIAL HTN: Primary | ICD-10-CM

## 2018-04-05 DIAGNOSIS — M54.16 LUMBAR RADICULOPATHY: ICD-10-CM

## 2018-04-05 DIAGNOSIS — G89.4 CHRONIC PAIN SYNDROME: ICD-10-CM

## 2018-04-05 PROCEDURE — 99214 OFFICE O/P EST MOD 30 MIN: CPT | Performed by: FAMILY MEDICINE

## 2018-04-05 RX ORDER — MONTELUKAST SODIUM 10 MG/1
10 TABLET ORAL NIGHTLY
Qty: 30 TABLET | Refills: 5 | Status: SHIPPED | OUTPATIENT
Start: 2018-04-05 | End: 2018-10-03 | Stop reason: SDUPTHER

## 2018-04-05 RX ORDER — LOSARTAN POTASSIUM 100 MG/1
100 TABLET ORAL DAILY
Qty: 30 TABLET | Refills: 5 | Status: SHIPPED | OUTPATIENT
Start: 2018-04-05 | End: 2018-10-03 | Stop reason: SDUPTHER

## 2018-04-05 RX ORDER — CYCLOBENZAPRINE HCL 10 MG
10 TABLET ORAL 3 TIMES DAILY PRN
Qty: 90 TABLET | Refills: 5 | Status: SHIPPED | OUTPATIENT
Start: 2018-04-05 | End: 2018-10-03 | Stop reason: SDUPTHER

## 2018-04-05 RX ORDER — HYDROCODONE BITARTRATE AND ACETAMINOPHEN 5; 325 MG/1; MG/1
1 TABLET ORAL 4 TIMES DAILY PRN
Qty: 120 TABLET | Refills: 0 | Status: SHIPPED | OUTPATIENT
Start: 2018-04-05 | End: 2018-05-02 | Stop reason: SDUPTHER

## 2018-04-07 VITALS
BODY MASS INDEX: 30.99 KG/M2 | OXYGEN SATURATION: 98 % | WEIGHT: 216 LBS | SYSTOLIC BLOOD PRESSURE: 140 MMHG | DIASTOLIC BLOOD PRESSURE: 92 MMHG | HEART RATE: 115 BPM

## 2018-04-07 ASSESSMENT — ENCOUNTER SYMPTOMS
DIARRHEA: 1
VOMITING: 0
CONSTIPATION: 0
WHEEZING: 0
TROUBLE SWALLOWING: 0
COLOR CHANGE: 0
SORE THROAT: 0
ABDOMINAL DISTENTION: 0
EYE PAIN: 1
CHEST TIGHTNESS: 0
BLOOD IN STOOL: 1
COUGH: 0
SINUS PAIN: 0
SHORTNESS OF BREATH: 0
NAUSEA: 0
ABDOMINAL PAIN: 1
RHINORRHEA: 1

## 2018-04-19 ENCOUNTER — HOSPITAL ENCOUNTER (OUTPATIENT)
Dept: MRI IMAGING | Age: 63
Discharge: HOME | End: 2018-04-19

## 2018-04-19 DIAGNOSIS — M54.2 CERVICALGIA: ICD-10-CM

## 2018-04-25 ENCOUNTER — HOSPITAL ENCOUNTER (OUTPATIENT)
Dept: NON INVASIVE DIAGNOSTICS | Age: 63
Discharge: HOME | End: 2018-04-25
Attending: FAMILY MEDICINE | Admitting: FAMILY MEDICINE

## 2018-04-25 ENCOUNTER — OFFICE VISIT (OUTPATIENT)
Dept: FAMILY MEDICINE CLINIC | Age: 63
End: 2018-04-25

## 2018-04-25 ENCOUNTER — ANTI-COAG VISIT (OUTPATIENT)
Dept: FAMILY MEDICINE CLINIC | Age: 63
End: 2018-04-25

## 2018-04-25 VITALS
HEART RATE: 110 BPM | SYSTOLIC BLOOD PRESSURE: 128 MMHG | OXYGEN SATURATION: 98 % | BODY MASS INDEX: 31.14 KG/M2 | DIASTOLIC BLOOD PRESSURE: 70 MMHG | WEIGHT: 217 LBS

## 2018-04-25 DIAGNOSIS — M54.16 LUMBAR RADICULOPATHY, CHRONIC: ICD-10-CM

## 2018-04-25 DIAGNOSIS — Z01.818 PRE-OP EVALUATION: Primary | ICD-10-CM

## 2018-04-25 DIAGNOSIS — Z01.818 ENCOUNTER FOR OTHER PREPROCEDURAL EXAMINATION: ICD-10-CM

## 2018-04-25 DIAGNOSIS — R00.0 SINUS TACHYCARDIA: ICD-10-CM

## 2018-04-25 LAB
ANION GAP SERPL CALCULATED.3IONS-SCNC: 23 MMOL/L (ref 3–16)
APTT: 36.1 SEC (ref 24.1–34.9)
BUN BLDV-MCNC: 22 MG/DL (ref 7–20)
CALCIUM SERPL-MCNC: 9.9 MG/DL (ref 8.3–10.6)
CHLORIDE BLD-SCNC: 97 MMOL/L (ref 99–110)
CO2: 19 MMOL/L (ref 21–32)
CREAT SERPL-MCNC: 1 MG/DL (ref 0.8–1.3)
GFR AFRICAN AMERICAN: >60
GFR NON-AFRICAN AMERICAN: >60
GLUCOSE BLD-MCNC: 99 MG/DL (ref 70–99)
HCT VFR BLD CALC: 53.2 % (ref 40.5–52.5)
HEMOGLOBIN: 17.5 G/DL (ref 13.5–17.5)
INR BLD: 1.03 (ref 0.85–1.15)
LEFT VENTRICULAR EJECTION FRACTION HIGH VALUE: 40 %
LEFT VENTRICULAR EJECTION FRACTION MODE: NORMAL
LV EF: 40 %
LVEF MODALITY: NORMAL
MCH RBC QN AUTO: 30 PG (ref 26–34)
MCHC RBC AUTO-ENTMCNC: 32.9 G/DL (ref 31–36)
MCV RBC AUTO: 91.3 FL (ref 80–100)
PDW BLD-RTO: 16.4 % (ref 12.4–15.4)
PLATELET # BLD: 292 K/UL (ref 135–450)
PMV BLD AUTO: 7.2 FL (ref 5–10.5)
POTASSIUM SERPL-SCNC: 4.5 MMOL/L (ref 3.5–5.1)
PROTHROMBIN TIME: 11.6 SEC (ref 9.6–13)
RBC # BLD: 5.83 M/UL (ref 4.2–5.9)
SODIUM BLD-SCNC: 139 MMOL/L (ref 136–145)
WBC # BLD: 6.5 K/UL (ref 4–11)

## 2018-04-25 PROCEDURE — 93000 ELECTROCARDIOGRAM COMPLETE: CPT | Performed by: FAMILY MEDICINE

## 2018-04-25 PROCEDURE — 36415 COLL VENOUS BLD VENIPUNCTURE: CPT | Performed by: FAMILY MEDICINE

## 2018-04-25 PROCEDURE — 99242 OFF/OP CONSLTJ NEW/EST SF 20: CPT | Performed by: FAMILY MEDICINE

## 2018-04-26 ENCOUNTER — OFFICE VISIT (OUTPATIENT)
Dept: CARDIOLOGY CLINIC | Age: 63
End: 2018-04-26

## 2018-04-26 VITALS
SYSTOLIC BLOOD PRESSURE: 136 MMHG | BODY MASS INDEX: 31.5 KG/M2 | HEIGHT: 70 IN | WEIGHT: 220 LBS | DIASTOLIC BLOOD PRESSURE: 75 MMHG | HEART RATE: 88 BPM

## 2018-04-26 DIAGNOSIS — I25.5 ISCHEMIC CARDIOMYOPATHY: ICD-10-CM

## 2018-04-26 DIAGNOSIS — R93.1 ABNORMAL ECHOCARDIOGRAM: ICD-10-CM

## 2018-04-26 DIAGNOSIS — Z01.810 PREOP CARDIOVASCULAR EXAM: Primary | ICD-10-CM

## 2018-04-26 LAB
ESTIMATED AVERAGE GLUCOSE: 108.3 MG/DL
HBA1C MFR BLD: 5.4 %

## 2018-04-26 PROCEDURE — 99245 OFF/OP CONSLTJ NEW/EST HI 55: CPT | Performed by: INTERNAL MEDICINE

## 2018-04-30 ENCOUNTER — TELEPHONE (OUTPATIENT)
Dept: FAMILY MEDICINE CLINIC | Age: 63
End: 2018-04-30

## 2018-04-30 ENCOUNTER — TELEPHONE (OUTPATIENT)
Dept: CARDIOLOGY CLINIC | Age: 63
End: 2018-04-30

## 2018-04-30 DIAGNOSIS — E78.2 MIXED HYPERLIPIDEMIA: ICD-10-CM

## 2018-04-30 DIAGNOSIS — R06.09 DYSPNEA ON EXERTION: Primary | ICD-10-CM

## 2018-04-30 DIAGNOSIS — I25.5 ISCHEMIC CARDIOMYOPATHY: ICD-10-CM

## 2018-05-02 ENCOUNTER — OFFICE VISIT (OUTPATIENT)
Dept: FAMILY MEDICINE CLINIC | Age: 63
End: 2018-05-02

## 2018-05-02 VITALS
SYSTOLIC BLOOD PRESSURE: 130 MMHG | DIASTOLIC BLOOD PRESSURE: 70 MMHG | HEART RATE: 85 BPM | WEIGHT: 224 LBS | BODY MASS INDEX: 32.14 KG/M2 | OXYGEN SATURATION: 98 %

## 2018-05-02 DIAGNOSIS — M54.16 LUMBAR RADICULOPATHY: ICD-10-CM

## 2018-05-02 DIAGNOSIS — I25.5 ISCHEMIC CARDIOMYOPATHY: Primary | ICD-10-CM

## 2018-05-02 DIAGNOSIS — M17.0 PRIMARY OSTEOARTHRITIS OF BOTH KNEES: ICD-10-CM

## 2018-05-02 DIAGNOSIS — I10 BENIGN ESSENTIAL HTN: ICD-10-CM

## 2018-05-02 DIAGNOSIS — K21.9 GASTROESOPHAGEAL REFLUX DISEASE WITHOUT ESOPHAGITIS: ICD-10-CM

## 2018-05-02 DIAGNOSIS — Z23 NEED FOR SHINGLES VACCINE: ICD-10-CM

## 2018-05-02 DIAGNOSIS — E04.1 THYROID NODULE: ICD-10-CM

## 2018-05-02 DIAGNOSIS — G89.4 CHRONIC PAIN SYNDROME: ICD-10-CM

## 2018-05-02 PROCEDURE — 99214 OFFICE O/P EST MOD 30 MIN: CPT | Performed by: FAMILY MEDICINE

## 2018-05-02 RX ORDER — HYDROCODONE BITARTRATE AND ACETAMINOPHEN 5; 325 MG/1; MG/1
1 TABLET ORAL 4 TIMES DAILY PRN
Qty: 120 TABLET | Refills: 0 | Status: SHIPPED | OUTPATIENT
Start: 2018-05-02 | End: 2018-06-06 | Stop reason: SDUPTHER

## 2018-05-02 RX ORDER — OMEPRAZOLE 40 MG/1
40 CAPSULE, DELAYED RELEASE ORAL DAILY
Qty: 30 CAPSULE | Refills: 3 | Status: SHIPPED | OUTPATIENT
Start: 2018-05-02 | End: 2018-08-16 | Stop reason: SDUPTHER

## 2018-05-02 RX ORDER — DICLOFENAC SODIUM 75 MG/1
TABLET, DELAYED RELEASE ORAL
Qty: 60 TABLET | Refills: 3 | Status: SHIPPED | OUTPATIENT
Start: 2018-05-02 | End: 2018-07-19

## 2018-05-04 DIAGNOSIS — R93.1 ABNORMAL ECHOCARDIOGRAM: ICD-10-CM

## 2018-05-04 DIAGNOSIS — R06.09 DYSPNEA ON EXERTION: Primary | ICD-10-CM

## 2018-05-05 ENCOUNTER — TELEPHONE (OUTPATIENT)
Dept: FAMILY MEDICINE CLINIC | Age: 63
End: 2018-05-05

## 2018-05-05 ASSESSMENT — ENCOUNTER SYMPTOMS
VOMITING: 0
COUGH: 0
ABDOMINAL PAIN: 0
EYES NEGATIVE: 1
WHEEZING: 0
SHORTNESS OF BREATH: 0
ABDOMINAL DISTENTION: 0
DIARRHEA: 0
COLOR CHANGE: 0
CONSTIPATION: 0
BLOOD IN STOOL: 0
NAUSEA: 0
CHEST TIGHTNESS: 0

## 2018-05-07 ENCOUNTER — HOSPITAL ENCOUNTER (OUTPATIENT)
Dept: NON INVASIVE DIAGNOSTICS | Age: 63
Discharge: HOME | End: 2018-05-07
Attending: INTERNAL MEDICINE | Admitting: INTERNAL MEDICINE

## 2018-05-07 ENCOUNTER — OFFICE VISIT (OUTPATIENT)
Dept: CARDIOLOGY CLINIC | Age: 63
End: 2018-05-07

## 2018-05-07 VITALS
WEIGHT: 219 LBS | BODY MASS INDEX: 30.66 KG/M2 | SYSTOLIC BLOOD PRESSURE: 130 MMHG | DIASTOLIC BLOOD PRESSURE: 80 MMHG | HEIGHT: 71 IN | HEART RATE: 120 BPM

## 2018-05-07 DIAGNOSIS — E78.2 MIXED HYPERLIPIDEMIA: ICD-10-CM

## 2018-05-07 DIAGNOSIS — R06.09 OTHER FORMS OF DYSPNEA: ICD-10-CM

## 2018-05-07 DIAGNOSIS — I25.5 ISCHEMIC CARDIOMYOPATHY: Primary | ICD-10-CM

## 2018-05-07 DIAGNOSIS — Z01.810 PREOP CARDIOVASCULAR EXAM: ICD-10-CM

## 2018-05-07 LAB
LV EF: 37 %
LVEF MODALITY: NORMAL

## 2018-05-07 PROCEDURE — 99214 OFFICE O/P EST MOD 30 MIN: CPT | Performed by: INTERNAL MEDICINE

## 2018-05-07 RX ORDER — LISINOPRIL 2.5 MG/1
2.5 TABLET ORAL DAILY
Qty: 30 TABLET | Refills: 11 | Status: CANCELLED | OUTPATIENT
Start: 2018-05-07

## 2018-05-07 RX ORDER — ATORVASTATIN CALCIUM 80 MG/1
80 TABLET, FILM COATED ORAL DAILY
Qty: 90 TABLET | Refills: 3 | Status: SHIPPED | OUTPATIENT
Start: 2018-05-07 | End: 2018-09-24 | Stop reason: SDUPTHER

## 2018-05-07 RX ORDER — METOPROLOL SUCCINATE 50 MG/1
50 TABLET, EXTENDED RELEASE ORAL DAILY
Qty: 30 TABLET | Refills: 11 | Status: SHIPPED | OUTPATIENT
Start: 2018-05-07 | End: 2018-06-06 | Stop reason: SDUPTHER

## 2018-05-26 PROBLEM — Z01.810 PREOP CARDIOVASCULAR EXAM: Status: RESOLVED | Noted: 2018-04-26 | Resolved: 2018-05-26

## 2018-05-30 ENCOUNTER — TELEPHONE (OUTPATIENT)
Dept: CARDIOLOGY CLINIC | Age: 63
End: 2018-05-30

## 2018-06-06 ENCOUNTER — OFFICE VISIT (OUTPATIENT)
Dept: CARDIOLOGY CLINIC | Age: 63
End: 2018-06-06

## 2018-06-06 ENCOUNTER — OFFICE VISIT (OUTPATIENT)
Dept: FAMILY MEDICINE CLINIC | Age: 63
End: 2018-06-06

## 2018-06-06 VITALS
HEIGHT: 71 IN | DIASTOLIC BLOOD PRESSURE: 78 MMHG | HEART RATE: 85 BPM | SYSTOLIC BLOOD PRESSURE: 122 MMHG | WEIGHT: 217 LBS | BODY MASS INDEX: 30.38 KG/M2

## 2018-06-06 VITALS
BODY MASS INDEX: 30.55 KG/M2 | DIASTOLIC BLOOD PRESSURE: 80 MMHG | SYSTOLIC BLOOD PRESSURE: 130 MMHG | WEIGHT: 216 LBS | HEART RATE: 78 BPM | OXYGEN SATURATION: 97 %

## 2018-06-06 DIAGNOSIS — I25.10 CORONARY ARTERY DISEASE INVOLVING NATIVE CORONARY ARTERY OF NATIVE HEART WITHOUT ANGINA PECTORIS: ICD-10-CM

## 2018-06-06 DIAGNOSIS — K64.9 HEMORRHOIDS, UNSPECIFIED HEMORRHOID TYPE: ICD-10-CM

## 2018-06-06 DIAGNOSIS — Z01.810 PREOP CARDIOVASCULAR EXAM: Primary | ICD-10-CM

## 2018-06-06 DIAGNOSIS — E78.2 MIXED HYPERLIPIDEMIA: ICD-10-CM

## 2018-06-06 DIAGNOSIS — I25.5 ISCHEMIC CARDIOMYOPATHY: ICD-10-CM

## 2018-06-06 DIAGNOSIS — M54.16 LUMBAR RADICULOPATHY: ICD-10-CM

## 2018-06-06 DIAGNOSIS — E11.9 CONTROLLED TYPE 2 DIABETES MELLITUS WITHOUT COMPLICATION, WITHOUT LONG-TERM CURRENT USE OF INSULIN (HCC): Primary | ICD-10-CM

## 2018-06-06 DIAGNOSIS — G89.4 CHRONIC PAIN SYNDROME: ICD-10-CM

## 2018-06-06 LAB
CREATININE URINE: 208.5 MG/DL (ref 39–259)
MICROALBUMIN UR-MCNC: 3.1 MG/DL
MICROALBUMIN/CREAT UR-RTO: 14.9 MG/G (ref 0–30)

## 2018-06-06 PROCEDURE — 99214 OFFICE O/P EST MOD 30 MIN: CPT | Performed by: FAMILY MEDICINE

## 2018-06-06 PROCEDURE — 99214 OFFICE O/P EST MOD 30 MIN: CPT | Performed by: INTERNAL MEDICINE

## 2018-06-06 RX ORDER — METOPROLOL SUCCINATE 50 MG/1
75 TABLET, EXTENDED RELEASE ORAL DAILY
Qty: 45 TABLET | Refills: 11
Start: 2018-06-06 | End: 2019-03-22 | Stop reason: SDUPTHER

## 2018-06-06 RX ORDER — HYDROCODONE BITARTRATE AND ACETAMINOPHEN 5; 325 MG/1; MG/1
1 TABLET ORAL 4 TIMES DAILY PRN
Qty: 120 TABLET | Refills: 0 | Status: SHIPPED | OUTPATIENT
Start: 2018-06-06 | End: 2018-07-05 | Stop reason: SDUPTHER

## 2018-06-06 ASSESSMENT — ENCOUNTER SYMPTOMS
CHEST TIGHTNESS: 0
CONSTIPATION: 0
BLOOD IN STOOL: 1
WHEEZING: 0
COUGH: 0
ABDOMINAL PAIN: 1
SHORTNESS OF BREATH: 0
DIARRHEA: 1

## 2018-07-05 ENCOUNTER — OFFICE VISIT (OUTPATIENT)
Dept: FAMILY MEDICINE CLINIC | Age: 63
End: 2018-07-05

## 2018-07-05 VITALS
WEIGHT: 214 LBS | HEART RATE: 77 BPM | DIASTOLIC BLOOD PRESSURE: 80 MMHG | SYSTOLIC BLOOD PRESSURE: 104 MMHG | OXYGEN SATURATION: 96 % | BODY MASS INDEX: 30.27 KG/M2

## 2018-07-05 DIAGNOSIS — G89.4 CHRONIC PAIN SYNDROME: ICD-10-CM

## 2018-07-05 DIAGNOSIS — Z87.891 HISTORY OF TOBACCO USE: ICD-10-CM

## 2018-07-05 DIAGNOSIS — M54.16 LUMBAR RADICULOPATHY: ICD-10-CM

## 2018-07-05 DIAGNOSIS — E11.9 CONTROLLED TYPE 2 DIABETES MELLITUS WITHOUT COMPLICATION, WITHOUT LONG-TERM CURRENT USE OF INSULIN (HCC): Primary | ICD-10-CM

## 2018-07-05 PROCEDURE — 99213 OFFICE O/P EST LOW 20 MIN: CPT | Performed by: FAMILY MEDICINE

## 2018-07-05 RX ORDER — HYDROCODONE BITARTRATE AND ACETAMINOPHEN 5; 325 MG/1; MG/1
1 TABLET ORAL 4 TIMES DAILY PRN
Qty: 120 TABLET | Refills: 0 | Status: SHIPPED | OUTPATIENT
Start: 2018-07-05 | End: 2018-08-16 | Stop reason: SDUPTHER

## 2018-07-05 ASSESSMENT — ENCOUNTER SYMPTOMS
DIARRHEA: 1
BLOOD IN STOOL: 0
ABDOMINAL PAIN: 0
NAUSEA: 0
SHORTNESS OF BREATH: 0

## 2018-07-05 NOTE — PROGRESS NOTES
MG tablet Take 1 tablet by mouth 3 times daily as needed for Muscle spasms 90 tablet 5    montelukast (SINGULAIR) 10 MG tablet Take 1 tablet by mouth nightly 30 tablet 5    losartan (COZAAR) 100 MG tablet Take 1 tablet by mouth daily 30 tablet 5    Vitamins-Lipotropics (MULTI-VITAMIN HP/MINERALS) CAPS Take by mouth      UNABLE TO FIND Nature made Iron      Multiple Vitamins-Minerals (CENTRUM SILVER 50+MEN PO) Take by mouth      UNABLE TO FIND Super Max Mineral Complex      b complex vitamins capsule Take 1 capsule by mouth daily      UNABLE TO FIND Pure Encapsulations Iodine      ipratropium (ATROVENT) 0.06 % nasal spray 2 sprays bid to b/l nostrils 1 Bottle 5    fluticasone (FLONASE) 50 MCG/ACT nasal spray 2 sprays by Nasal route daily      buPROPion (WELLBUTRIN XL) 150 MG extended release tablet Take 150 mg by mouth every morning      buPROPion (WELLBUTRIN XL) 300 MG extended release tablet Take 300 mg by mouth every morning      ondansetron (ZOFRAN ODT) 4 MG disintegrating tablet Take 1 tablet by mouth every 8 hours as needed for Nausea or Vomiting 20 tablet 0    sildenafil (VIAGRA) 100 MG tablet Take 1 tablet by mouth as needed for Erectile Dysfunction 30 tablet 1    Glucose Blood (BLOOD GLUCOSE TEST STRIPS) STRP One touch ultra 2;Checks blood sugar bid 100 strip 5    diclofenac sodium (VOLTAREN) 1 % GEL APPLY 4 GRAMS TOPICALLY 4 TIMES DAILY 500 g 5    Cholecalciferol (VITAMIN D) 2000 UNITS CAPS capsule Take  by mouth 2 times daily.  Testosterone 30 MG/ACT SOLN APPLY 1 PUMP TO EACH UNDERARM EVERY MORNING 90 g 5     Current Facility-Administered Medications   Medication Dose Route Frequency Provider Last Rate Last Dose    MethylPREDNISolone Acetate SUSP 40 mg  40 mg Injection Once Janeth Johnston MD           Patient's past medical history, surgical history, family history, medications,  and allergies  were all reviewed and updated as appropriate today.     Review of Systems Constitutional: Negative for activity change, appetite change and fever. Respiratory: Negative for shortness of breath. Cardiovascular: Negative for chest pain. Gastrointestinal: Positive for diarrhea. Negative for abdominal pain, blood in stool and nausea. Psychiatric/Behavioral: Negative for agitation and dysphoric mood. The patient is not nervous/anxious. Physical Exam   Constitutional: He is oriented to person, place, and time. He appears well-developed and well-nourished. HENT:   Head: Normocephalic and atraumatic. Eyes: Conjunctivae and EOM are normal. No scleral icterus. Pulmonary/Chest: Effort normal.   Musculoskeletal: Normal range of motion. Neurological: He is alert and oriented to person, place, and time. Skin: Skin is warm and dry. Psychiatric: He has a normal mood and affect. His behavior is normal. Judgment and thought content normal.         /80 (Site: Right Arm, Position: Sitting, Cuff Size: Medium Adult)   Pulse 77   Wt 214 lb (97.1 kg)   SpO2 96%   BMI 30.27 kg/m²     Assessment/Plan:    Denise Smith was seen today for 1 month follow-up. Diagnoses and all orders for this visit:    Chronic pain syndrome  -     HYDROcodone-acetaminophen (NORCO) 5-325 MG per tablet; Take 1 tablet by mouth 4 times daily as needed for Pain for up to 30 days. Fill 11/5/17. Lumbar radiculopathy  -     HYDROcodone-acetaminophen (NORCO) 5-325 MG per tablet; Take 1 tablet by mouth 4 times daily as needed for Pain for up to 30 days. Fill 11/5/17. DM2 controlled   Wean metformin to 500 bid with meals. History Tob use   LDCT mentioned, pt to consider after surgery    Controlled Substances Monitoring:     RX Monitoring 7/5/2018   Attestation The Prescription Monitoring Report for this patient was reviewed today. Documentation Possible medication side effects, risk of tolerance/dependence & alternative treatments discussed. ;No signs of potential drug abuse or diversion identified. Chronic Pain -   Medication Contracts Existing medication contract.            LDCT after surgery  Iraida Medina in [facility] 1723 down metformin

## 2018-07-18 DIAGNOSIS — N17.9 ACUTE RENAL FAILURE, UNSPECIFIED ACUTE RENAL FAILURE TYPE (HCC): Primary | ICD-10-CM

## 2018-07-19 ENCOUNTER — OFFICE VISIT (OUTPATIENT)
Dept: FAMILY MEDICINE CLINIC | Age: 63
End: 2018-07-19

## 2018-07-19 ENCOUNTER — HOSPITAL ENCOUNTER (OUTPATIENT)
Dept: OTHER | Age: 63
Discharge: HOME | End: 2018-07-19
Attending: FAMILY MEDICINE | Admitting: FAMILY MEDICINE

## 2018-07-19 ENCOUNTER — TELEPHONE (OUTPATIENT)
Dept: FAMILY MEDICINE CLINIC | Age: 63
End: 2018-07-19

## 2018-07-19 VITALS
WEIGHT: 212 LBS | HEART RATE: 78 BPM | DIASTOLIC BLOOD PRESSURE: 82 MMHG | BODY MASS INDEX: 29.99 KG/M2 | SYSTOLIC BLOOD PRESSURE: 124 MMHG | OXYGEN SATURATION: 97 %

## 2018-07-19 DIAGNOSIS — M54.16 LUMBAR RADICULOPATHY: ICD-10-CM

## 2018-07-19 DIAGNOSIS — N17.9 ACUTE RENAL FAILURE, UNSPECIFIED ACUTE RENAL FAILURE TYPE (HCC): ICD-10-CM

## 2018-07-19 DIAGNOSIS — E86.0 DEHYDRATION: ICD-10-CM

## 2018-07-19 DIAGNOSIS — Z01.818 PREOP EXAMINATION: Primary | ICD-10-CM

## 2018-07-19 DIAGNOSIS — G89.4 CHRONIC PAIN SYNDROME: ICD-10-CM

## 2018-07-19 LAB
ANION GAP SERPL CALCULATED.3IONS-SCNC: 13 MMOL/L (ref 3–16)
BUN BLDV-MCNC: 36 MG/DL (ref 7–20)
CALCIUM SERPL-MCNC: 10.1 MG/DL (ref 8.3–10.6)
CHLORIDE BLD-SCNC: 104 MMOL/L (ref 99–110)
CO2: 24 MMOL/L (ref 21–32)
CREAT SERPL-MCNC: 1.4 MG/DL (ref 0.8–1.3)
GFR AFRICAN AMERICAN: >60
GFR NON-AFRICAN AMERICAN: 51
GLUCOSE BLD-MCNC: 92 MG/DL (ref 70–99)
POTASSIUM SERPL-SCNC: 4.6 MMOL/L (ref 3.5–5.1)
SODIUM BLD-SCNC: 141 MMOL/L (ref 136–145)

## 2018-07-19 PROCEDURE — 99242 OFF/OP CONSLTJ NEW/EST SF 20: CPT | Performed by: FAMILY MEDICINE

## 2018-07-19 NOTE — PROGRESS NOTES
succinate (TOPROL XL) 50 MG extended release tablet Take 1.5 tablets by mouth daily 45 tablet 11    metFORMIN (GLUCOPHAGE) 1000 MG tablet Take 1 tablet by mouth 2 times daily (with meals) 60 tablet 5    atorvastatin (LIPITOR) 80 MG tablet Take 1 tablet by mouth daily 90 tablet 3    omeprazole (PRILOSEC) 40 MG delayed release capsule Take 1 capsule by mouth daily 30 capsule 3                  cyclobenzaprine (FLEXERIL) 10 MG tablet Take 1 tablet by mouth 3 times daily as needed for Muscle spasms 90 tablet 5    montelukast (SINGULAIR) 10 MG tablet Take 1 tablet by mouth nightly 30 tablet 5    losartan (COZAAR) 100 MG tablet Take 1 tablet by mouth daily 30 tablet 5    Vitamins-Lipotropics (MULTI-VITAMIN HP/MINERALS) CAPS Take by mouth      UNABLE TO FIND Nature made Iron      Multiple Vitamins-Minerals (CENTRUM SILVER 50+MEN PO) Take by mouth      UNABLE TO FIND Super Max Mineral Complex      b complex vitamins capsule Take 1 capsule by mouth daily      UNABLE TO FIND Pure Encapsulations Iodine      ipratropium (ATROVENT) 0.06 % nasal spray 2 sprays bid to b/l nostrils 1 Bottle 5    fluticasone (FLONASE) 50 MCG/ACT nasal spray 2 sprays by Nasal route daily      buPROPion (WELLBUTRIN XL) 150 MG extended release tablet Take 150 mg by mouth every morning      buPROPion (WELLBUTRIN XL) 300 MG extended release tablet Take 300 mg by mouth every morning      ondansetron (ZOFRAN ODT) 4 MG disintegrating tablet Take 1 tablet by mouth every 8 hours as needed for Nausea or Vomiting 20 tablet 0    sildenafil (VIAGRA) 100 MG tablet Take 1 tablet by mouth as needed for Erectile Dysfunction 30 tablet 1    Glucose Blood (BLOOD GLUCOSE TEST STRIPS) STRP One touch ultra 2;Checks blood sugar bid 100 strip 5    diclofenac sodium (VOLTAREN) 1 % GEL APPLY 4 GRAMS TOPICALLY 4 TIMES DAILY 500 g 5    Cholecalciferol (VITAMIN D) 2000 UNITS CAPS capsule Take  by mouth 2 times daily.       Testosterone 30 MG/ACT SOLN APPLY bruit    Heart:  Normal apical impulse, regular rate and rhythm, normal S1 and S2, no S3 or S4, and no murmur noted    Lungs:  No increased work of breathing, good air exchange, clear to auscultation bilaterally, no crackles or wheezing    Abdomen:   normal bowel sounds, soft, non-distended, non-tender, no masses palpated, no hepatosplenomegally    Extremities:  No clubbing, cyanosis, or edema, 2+ pulses    NEUROLOGIC:  Awake, alert, oriented to name, place and time. Cranial nerves II-XII are grossly intact. Motor is 5 out of 5 bilaterally. Positive B/L SLR. See Attached    ASSESSMENT AND PLAN:    1. Patient is a 66 y.o. male cleared for the above specified procedure planned on 8/7/2018 with Dr. Jodie Rockwell at the [facility]. Cardiac clearance with moderate risk has been given by Dr Radha Dugan on 6/6/18.  2. Stopped voltaren, ASA, ibuprofen 7/18/18.     CHARLOTTE [facility], M.D    [address] [place] [address]  [address] [address] [id number] [address]  [phone]

## 2018-07-25 ENCOUNTER — HOSPITAL ENCOUNTER (OUTPATIENT)
Dept: ULTRASOUND IMAGING | Age: 63
Discharge: HOME | End: 2018-07-25
Admitting: FAMILY MEDICINE

## 2018-07-25 DIAGNOSIS — E04.1 NONTOXIC SINGLE THYROID NODULE: ICD-10-CM

## 2018-07-25 DIAGNOSIS — E04.1 THYROID NODULE: ICD-10-CM

## 2018-08-02 ENCOUNTER — TELEPHONE (OUTPATIENT)
Dept: FAMILY MEDICINE CLINIC | Age: 63
End: 2018-08-02

## 2018-08-02 ENCOUNTER — TELEPHONE (OUTPATIENT)
Dept: CARDIOLOGY CLINIC | Age: 63
End: 2018-08-02

## 2018-08-02 NOTE — TELEPHONE ENCOUNTER
Paige martinez/[facility] called and wanted to know if she could get a copy of the patient's heart catherization report. I am not able to access it. She also wanted a copy of the Echocardiogram report and I printed that report off and will fax it. Please fax the cath report.     Thanks

## 2018-08-16 ENCOUNTER — OFFICE VISIT (OUTPATIENT)
Dept: FAMILY MEDICINE CLINIC | Age: 63
End: 2018-08-16

## 2018-08-16 VITALS
BODY MASS INDEX: 29 KG/M2 | OXYGEN SATURATION: 99 % | TEMPERATURE: 98 F | SYSTOLIC BLOOD PRESSURE: 120 MMHG | WEIGHT: 205 LBS | HEART RATE: 77 BPM | DIASTOLIC BLOOD PRESSURE: 70 MMHG

## 2018-08-16 DIAGNOSIS — M17.0 PRIMARY LOCALIZED OSTEOARTHRITIS OF BOTH KNEES: ICD-10-CM

## 2018-08-16 DIAGNOSIS — M17.0 PRIMARY OSTEOARTHRITIS OF BOTH KNEES: Primary | ICD-10-CM

## 2018-08-16 DIAGNOSIS — M54.16 LUMBAR RADICULOPATHY: ICD-10-CM

## 2018-08-16 DIAGNOSIS — N28.9 RENAL INSUFFICIENCY: ICD-10-CM

## 2018-08-16 DIAGNOSIS — E11.9 CONTROLLED TYPE 2 DIABETES MELLITUS WITHOUT COMPLICATION, WITHOUT LONG-TERM CURRENT USE OF INSULIN (HCC): ICD-10-CM

## 2018-08-16 DIAGNOSIS — K21.9 GASTROESOPHAGEAL REFLUX DISEASE WITHOUT ESOPHAGITIS: ICD-10-CM

## 2018-08-16 DIAGNOSIS — E04.1 THYROID NODULE: ICD-10-CM

## 2018-08-16 DIAGNOSIS — G89.4 CHRONIC PAIN SYNDROME: ICD-10-CM

## 2018-08-16 PROCEDURE — 99214 OFFICE O/P EST MOD 30 MIN: CPT | Performed by: FAMILY MEDICINE

## 2018-08-16 PROCEDURE — 36415 COLL VENOUS BLD VENIPUNCTURE: CPT | Performed by: FAMILY MEDICINE

## 2018-08-16 PROCEDURE — 20610 DRAIN/INJ JOINT/BURSA W/O US: CPT | Performed by: FAMILY MEDICINE

## 2018-08-16 RX ORDER — OMEPRAZOLE 40 MG/1
40 CAPSULE, DELAYED RELEASE ORAL DAILY
Qty: 30 CAPSULE | Refills: 5 | Status: SHIPPED | OUTPATIENT
Start: 2018-08-16 | End: 2018-08-16 | Stop reason: SDUPTHER

## 2018-08-16 RX ORDER — DICLOFENAC SODIUM 75 MG/1
TABLET, DELAYED RELEASE ORAL
Qty: 60 TABLET | Refills: 5 | Status: SHIPPED | OUTPATIENT
Start: 2018-08-16 | End: 2018-09-17

## 2018-08-16 RX ORDER — HYDROCODONE BITARTRATE AND ACETAMINOPHEN 5; 325 MG/1; MG/1
1 TABLET ORAL 4 TIMES DAILY PRN
Qty: 120 TABLET | Refills: 0 | Status: SHIPPED | OUTPATIENT
Start: 2018-08-16 | End: 2018-09-17 | Stop reason: SDUPTHER

## 2018-08-16 RX ORDER — OMEPRAZOLE 40 MG/1
40 CAPSULE, DELAYED RELEASE ORAL DAILY
Qty: 30 CAPSULE | Refills: 5 | Status: SHIPPED | OUTPATIENT
Start: 2018-08-16 | End: 2018-09-17

## 2018-08-16 RX ORDER — METHYLPREDNISOLONE ACETATE 80 MG/ML
80 INJECTION, SUSPENSION INTRA-ARTICULAR; INTRALESIONAL; INTRAMUSCULAR; SOFT TISSUE ONCE
Status: COMPLETED | OUTPATIENT
Start: 2018-08-16 | End: 2018-08-16

## 2018-08-16 RX ORDER — HYDROCODONE BITARTRATE AND ACETAMINOPHEN 5; 325 MG/1; MG/1
1 TABLET ORAL 4 TIMES DAILY PRN
Qty: 120 TABLET | Refills: 0 | Status: SHIPPED | OUTPATIENT
Start: 2018-08-16 | End: 2018-08-16 | Stop reason: SDUPTHER

## 2018-08-16 RX ADMIN — METHYLPREDNISOLONE ACETATE 80 MG: 80 INJECTION, SUSPENSION INTRA-ARTICULAR; INTRALESIONAL; INTRAMUSCULAR; SOFT TISSUE at 15:34

## 2018-08-16 NOTE — PROGRESS NOTES
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 Back pain is less but now has shocks radiating down R lat leg, not present prior to surgery. Pt is averaging 6 norco per day but was using 4/d prior to surgery. Stayed in hospital 3 days, but sx's began the day following d/c home, on/off since. Sees Dr Dayna Moore for f/u 9/12/18. Surgeon rx'd tramadol. After electric shock sensation, RLE feels weak x 2-3 days. Pt is awaiting return call from surgeon. Pt was told to walk as pain allows. Was not rx'd PT. Pt noted significant bloody drainage in R and L paraspinal drains after surgery. Is unsure if post op hgb was checked. Pt cont's to avoid ibu and ASA other than ASA 81 mg from cardiologist. However, Dr Dayna Moore said he could resume diclofenac but no other nsaids x 6 mo. Took 1 bid x 3 days but stopped a day ago 2/2 no improvement in pain. Pt has had elevated Cr and low GFR in past, thought to be 2/2 nsaid overuse. B/l knee pain is very painful. To have R TKR in Oct  8, 2018. Pt requests b/l knee injections, last 1 yr ago. FBS 87 after not eating x 2 days recently, forgot to eat. Since colitis in 1995, has never felt hungry. Has been eating 2 meals per day. Eats vegetables. Is ready to proceed with bx of thyr nodule. No dysphagia or neck pain. Current Outpatient Prescriptions   Medication Sig Dispense Refill    diclofenac (VOLTAREN) 75 MG EC tablet 1 po bid prn OA pain. Take with food. 60 tablet 5    omeprazole (PRILOSEC) 40 MG delayed release capsule Take 1 capsule by mouth daily 30 capsule 5    HYDROcodone-acetaminophen (NORCO) 5-325 MG per tablet Take 1 tablet by mouth 4 times daily as needed for Pain for up to 30 days.  Fill 11/5/17. 120 tablet 0    metoprolol succinate (TOPROL XL) 50 MG extended release tablet Take 1.5 tablets by mouth family history, medications,  and allergies  were all reviewed and updated as appropriate today. Review of Systems      Physical Exam   Constitutional: He is oriented to person, place, and time. He appears well-developed and well-nourished. Antalgic gait  Tens unit on back and LEs   HENT:   Head: Normocephalic and atraumatic. Eyes: Conjunctivae and EOM are normal. No scleral icterus. Pulmonary/Chest: Effort normal.   Musculoskeletal: Normal range of motion. He exhibits deformity. Bony deformity R>L ant knees   Neurological: He is alert and oriented to person, place, and time. Skin: Skin is warm and dry. Psychiatric: He has a normal mood and affect. His behavior is normal. Judgment and thought content normal.         /70 (Site: Left Arm, Position: Sitting, Cuff Size: Medium Adult)   Pulse 77   Temp 98 °F (36.7 °C) (Oral)   Wt 205 lb (93 kg)   SpO2 99%   BMI 29.00 kg/m²     Assessment/Plan:    Carina Ibarra was seen today for 1 month follow-up. Diagnoses and all orders for this visit:    Primary osteoarthritis of both knees  -     Pt is asked to avoid diclofenac (VOLTAREN) 75 MG EC tablet; 1 po bid prn OA pain. Take with food--avoid until renal labs back. Each knee was cleansed with betadine and alcohol and a medial approach was taken to inject 80 mg depo medrol and 1/2 cc of lidocaine 1% into each knee, tolerated well. L knee replacement 10/18.  -     methylPREDNISolone acetate (DEPO-MEDROL) injection 80 mg; Inject 1 mL into the articular space once  -     methylPREDNISolone acetate (DEPO-MEDROL) injection 80 mg; Inject 1 mL into the articular space once    Gastroesophageal reflux disease without esophagitis  -     omeprazole (PRILOSEC) 40 MG delayed release capsule; Take 1 capsule by mouth daily, rf   No recent sx's of colitis    Chronic pain syndrome  -     HYDROcodone-acetaminophen (NORCO) 5-325 MG per tablet; Take 1 tablet by mouth 4 times daily as needed for Pain for up to 30 days.  Fill

## 2018-08-17 LAB
A/G RATIO: 2.2 (ref 1.1–2.2)
ALBUMIN SERPL-MCNC: 4.6 G/DL (ref 3.4–5)
ALP BLD-CCNC: 105 U/L (ref 40–129)
ALT SERPL-CCNC: 12 U/L (ref 10–40)
ANION GAP SERPL CALCULATED.3IONS-SCNC: 16 MMOL/L (ref 3–16)
AST SERPL-CCNC: 11 U/L (ref 15–37)
BILIRUB SERPL-MCNC: 0.4 MG/DL (ref 0–1)
BUN BLDV-MCNC: 20 MG/DL (ref 7–20)
CALCIUM SERPL-MCNC: 9.8 MG/DL (ref 8.3–10.6)
CHLORIDE BLD-SCNC: 105 MMOL/L (ref 99–110)
CO2: 24 MMOL/L (ref 21–32)
CREAT SERPL-MCNC: 1.6 MG/DL (ref 0.8–1.3)
ESTIMATED AVERAGE GLUCOSE: 91.1 MG/DL
GFR AFRICAN AMERICAN: 53
GFR NON-AFRICAN AMERICAN: 44
GLOBULIN: 2.1 G/DL
GLUCOSE BLD-MCNC: 106 MG/DL (ref 70–99)
HBA1C MFR BLD: 4.8 %
HCT VFR BLD CALC: 38.5 % (ref 40.5–52.5)
HEMOGLOBIN: 12.4 G/DL (ref 13.5–17.5)
MCH RBC QN AUTO: 30.7 PG (ref 26–34)
MCHC RBC AUTO-ENTMCNC: 32.1 G/DL (ref 31–36)
MCV RBC AUTO: 95.7 FL (ref 80–100)
PDW BLD-RTO: 15.2 % (ref 12.4–15.4)
PLATELET # BLD: 405 K/UL (ref 135–450)
PMV BLD AUTO: 6.8 FL (ref 5–10.5)
POTASSIUM SERPL-SCNC: 4.9 MMOL/L (ref 3.5–5.1)
RBC # BLD: 4.02 M/UL (ref 4.2–5.9)
SODIUM BLD-SCNC: 145 MMOL/L (ref 136–145)
TOTAL PROTEIN: 6.7 G/DL (ref 6.4–8.2)
WBC # BLD: 9.2 K/UL (ref 4–11)

## 2018-09-09 DIAGNOSIS — E29.1 HYPOGONADISM IN MALE: Primary | ICD-10-CM

## 2018-09-10 RX ORDER — TESTOSTERONE 30 MG/1.5ML
SOLUTION TOPICAL
Qty: 90 G | Refills: 5 | Status: SHIPPED | OUTPATIENT
Start: 2018-09-10 | End: 2018-09-24

## 2018-09-10 RX ORDER — FLUTICASONE PROPIONATE 50 MCG
SPRAY, SUSPENSION (ML) NASAL
Qty: 16 G | Refills: 6 | Status: SHIPPED | OUTPATIENT
Start: 2018-09-10 | End: 2019-03-20 | Stop reason: SDUPTHER

## 2018-09-17 ENCOUNTER — HOSPITAL ENCOUNTER (OUTPATIENT)
Dept: OTHER | Age: 63
Discharge: HOME | End: 2018-09-17
Attending: FAMILY MEDICINE | Admitting: FAMILY MEDICINE

## 2018-09-17 ENCOUNTER — OFFICE VISIT (OUTPATIENT)
Dept: FAMILY MEDICINE CLINIC | Age: 63
End: 2018-09-17

## 2018-09-17 VITALS
HEART RATE: 68 BPM | OXYGEN SATURATION: 98 % | DIASTOLIC BLOOD PRESSURE: 68 MMHG | WEIGHT: 211 LBS | BODY MASS INDEX: 29.85 KG/M2 | SYSTOLIC BLOOD PRESSURE: 136 MMHG

## 2018-09-17 DIAGNOSIS — Z01.818 PREOP EXAMINATION: ICD-10-CM

## 2018-09-17 DIAGNOSIS — M17.10 PRIMARY LOCALIZED OSTEOARTHRITIS OF KNEE: ICD-10-CM

## 2018-09-17 DIAGNOSIS — G89.4 CHRONIC PAIN SYNDROME: ICD-10-CM

## 2018-09-17 DIAGNOSIS — M54.16 LUMBAR RADICULOPATHY: ICD-10-CM

## 2018-09-17 DIAGNOSIS — K21.9 GASTROESOPHAGEAL REFLUX DISEASE WITHOUT ESOPHAGITIS: Primary | ICD-10-CM

## 2018-09-17 LAB
ANION GAP SERPL CALCULATED.3IONS-SCNC: 14 MMOL/L (ref 3–16)
APTT: 36.1 SEC (ref 26–36)
BUN BLDV-MCNC: 15 MG/DL (ref 7–20)
CALCIUM SERPL-MCNC: 9.6 MG/DL (ref 8.3–10.6)
CHLORIDE BLD-SCNC: 101 MMOL/L (ref 99–110)
CO2: 25 MMOL/L (ref 21–32)
CREAT SERPL-MCNC: 1.2 MG/DL (ref 0.8–1.3)
GFR AFRICAN AMERICAN: >60
GFR NON-AFRICAN AMERICAN: >60
GLUCOSE BLD-MCNC: 88 MG/DL (ref 70–99)
HCT VFR BLD CALC: 41.8 % (ref 40.5–52.5)
HEMOGLOBIN: 13.5 G/DL (ref 13.5–17.5)
INR BLD: 1.07 (ref 0.86–1.14)
MCH RBC QN AUTO: 31 PG (ref 26–34)
MCHC RBC AUTO-ENTMCNC: 32.4 G/DL (ref 31–36)
MCV RBC AUTO: 95.7 FL (ref 80–100)
PDW BLD-RTO: 14.5 % (ref 12.4–15.4)
PLATELET # BLD: 250 K/UL (ref 135–450)
PMV BLD AUTO: 7 FL (ref 5–10.5)
POTASSIUM SERPL-SCNC: 4.6 MMOL/L (ref 3.5–5.1)
PROTHROMBIN TIME: 12.2 SEC (ref 9.8–13)
RBC # BLD: 4.37 M/UL (ref 4.2–5.9)
SODIUM BLD-SCNC: 140 MMOL/L (ref 136–145)
WBC # BLD: 7.1 K/UL (ref 4–11)

## 2018-09-17 PROCEDURE — 99243 OFF/OP CNSLTJ NEW/EST LOW 30: CPT | Performed by: FAMILY MEDICINE

## 2018-09-17 PROCEDURE — 36415 COLL VENOUS BLD VENIPUNCTURE: CPT | Performed by: FAMILY MEDICINE

## 2018-09-17 RX ORDER — RANITIDINE 150 MG/1
150 TABLET ORAL 2 TIMES DAILY
Qty: 60 TABLET | Refills: 5 | Status: SHIPPED | OUTPATIENT
Start: 2018-09-17 | End: 2019-03-20 | Stop reason: SDUPTHER

## 2018-09-17 RX ORDER — HYDROCODONE BITARTRATE AND ACETAMINOPHEN 5; 325 MG/1; MG/1
1 TABLET ORAL 4 TIMES DAILY PRN
Qty: 120 TABLET | Refills: 0 | Status: SHIPPED | OUTPATIENT
Start: 2018-09-17 | End: 2018-10-18 | Stop reason: SDUPTHER

## 2018-09-17 NOTE — PROGRESS NOTES
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 Was having LE shocks and weakness at last visit, improving. Was told he no longer has to wear back brace. Has 10 lb lifting limit. Is using bone growth stimulator for next 6 mo. Is avoiding nsaids 2/2 CKD. B/l knees were injected last visit. To have R TKR 10/8/18. Was referred to Dr Tao Tatum, [address] [facility], for thyr nodules. Has not yet made appt.        Current Outpatient Prescriptions   Medication Sig Dispense Refill    Testosterone 30 MG/ACT SOLN APPLY 1 PUMP TO EACH UNDERARM EVERY MORNING 90 g 5    diclofenac sodium 1 % GEL apply 4 grams to affected area four times a day 500 g 5    fluticasone (FLONASE) 50 MCG/ACT nasal spray instill 2 sprays into each nostril once daily 16 g 6    omeprazole (PRILOSEC) 40 MG delayed release capsule Take 1 capsule by mouth daily 30 capsule 5    metoprolol succinate (TOPROL XL) 50 MG extended release tablet Take 1.5 tablets by mouth daily 45 tablet 11    atorvastatin (LIPITOR) 80 MG tablet Take 1 tablet by mouth daily 90 tablet 3    cyclobenzaprine (FLEXERIL) 10 MG tablet Take 1 tablet by mouth 3 times daily as needed for Muscle spasms 90 tablet 5    montelukast (SINGULAIR) 10 MG tablet Take 1 tablet by mouth nightly 30 tablet 5    losartan (COZAAR) 100 MG tablet Take 1 tablet by mouth daily 30 tablet 5    Vitamins-Lipotropics (MULTI-VITAMIN HP/MINERALS) CAPS Take by mouth      UNABLE TO FIND Nature made Iron      Multiple Vitamins-Minerals (CENTRUM SILVER 50+MEN PO) Take by mouth      UNABLE TO FIND Super Max Mineral Complex      b complex vitamins capsule Take 1 capsule by mouth daily      UNABLE TO FIND Pure Encapsulations Iodine      ipratropium (ATROVENT) 0.06 % nasal spray 2 sprays bid to b/l nostrils 1 Bottle 5   

## 2018-09-17 NOTE — PROGRESS NOTES
300 mg by mouth every morning Yes Historical Provider, MD   ondansetron (ZOFRAN ODT) 4 MG disintegrating tablet Take 1 tablet by mouth every 8 hours as needed for Nausea or Vomiting Yes Sergey Mathur MD   sildenafil (VIAGRA) 100 MG tablet Take 1 tablet by mouth as needed for Erectile Dysfunction Yes Sergey Mathur MD   Glucose Blood (BLOOD GLUCOSE TEST STRIPS) STRP One touch ultra 2;Checks blood sugar bid Yes Sergey Mathur MD   Cholecalciferol (VITAMIN D) 2000 UNITS CAPS capsule Take  by mouth 2 times daily. Yes Historical Provider, MD        Allergies   Allergen Reactions    Nsaids        Past Medical History:   Diagnosis Date    Bipolar 2 disorder ([facility] 8/12    CAD (coronary artery disease), native coronary artery 05/2018    LAD, w/u reveals silent MI    Chronic back pain     DDD (degenerative disc disease), cervical 11/13/2017    per Donnie Osman DDD (degenerative disc disease), lumbar     11/17 xrays    DM2 (diabetes mellitus, type 2) ([facility] [address] [facility]     Elevated LFTs     HLD (hyperlipidemia)     HTN (hypertension)     \"pt states no HTN after weight loss\"    Tachycardia     Tobacco use     Ulcerative colitis ([facility] [address] [facility]     Vitamin D deficiency 4/13       Past Surgical History:   Procedure Laterality Date    COLONOSCOPY  2000    \"Numerous colonoscopy's per pt\"    COLONOSCOPY  03/20/2018    normal colon with biopsy, grade 1 internal hems.  KNEE SURGERY Right 1978    Menisectomy    LUMBAR SPINE SURGERY  2007    L4-5 discectomy    NERVE BLOCK  2005    lumbar spine    TONSILLECTOMY  1962       Social History     Social History    Marital status: Single     Spouse name: N/A    Number of children: N/A    Years of education: N/A     Occupational History    Not on file.      Social History Main Topics    Smoking status: Former Smoker     Packs/day: 2.50     Years: 15.00     Quit date: 9/1/2015    Smokeless tobacco: Never Used      Comment: quit 1 yr ago     Alcohol use No    Drug use: No    Sexual activity: Not on file     Other Topics Concern    Not on file     Social History Narrative    No narrative on file        Family History   Problem Relation Age of Onset    Cancer Mother         ovarian, stage 3C dx'd 2002, doing well 2018    Heart Failure Mother     Cancer Father         rectal, dx age 80    Heart Failure Father        Vitals:    09/17/18 1519   BP: 136/68   Pulse: 68   SpO2: 98%   Weight: 211 lb (95.7 kg)     Estimated body mass index is 29.85 kg/m² as calculated from the following:    Height as of 6/6/18: 5' 10.5\" (1.791 m). Weight as of this encounter: 211 lb (95.7 kg). Physical Exam    ASSESSMENT/PLAN:  There are no diagnoses linked to this encounter. No Follow-up on file.     An  electronic signature was used to authenticate this note.    --Lizzy Leon MD on 9/17/2018 at 3:28 PM

## 2018-09-17 NOTE — PROGRESS NOTES
Subjective:      Patient ID: Susan Luther is a 66 y.o. male.     HPI         ERROR    Review of Systems    Objective:   Physical Exam    Assessment:            Plan:              Seamus Chou MD

## 2018-09-17 NOTE — PROGRESS NOTES
cyclobenzaprine (FLEXERIL) 10 MG tablet Take 1 tablet by mouth 3 times daily as needed for Muscle spasms 90 tablet 5    montelukast (SINGULAIR) 10 MG tablet Take 1 tablet by mouth nightly 30 tablet 5    losartan (COZAAR) 100 MG tablet Take 1 tablet by mouth daily 30 tablet 5    Vitamins-Lipotropics (MULTI-VITAMIN HP/MINERALS) CAPS Take by mouth      UNABLE TO FIND Nature made Iron      Multiple Vitamins-Minerals (CENTRUM SILVER 50+MEN PO) Take by mouth      UNABLE TO FIND Super Max Mineral Complex      b complex vitamins capsule Take 1 capsule by mouth daily      UNABLE TO FIND Pure Encapsulations Iodine      ipratropium (ATROVENT) 0.06 % nasal spray 2 sprays bid to b/l nostrils 1 Bottle 5    buPROPion (WELLBUTRIN XL) 150 MG extended release tablet Take 150 mg by mouth every morning      buPROPion (WELLBUTRIN XL) 300 MG extended release tablet Take 300 mg by mouth every morning      ondansetron (ZOFRAN ODT) 4 MG disintegrating tablet Take 1 tablet by mouth every 8 hours as needed for Nausea or Vomiting 20 tablet 0    sildenafil (VIAGRA) 100 MG tablet Take 1 tablet by mouth as needed for Erectile Dysfunction 30 tablet 1    Glucose Blood (BLOOD GLUCOSE TEST STRIPS) STRP One touch ultra 2;Checks blood sugar bid 100 strip 5    Cholecalciferol (VITAMIN D) 2000 UNITS CAPS capsule Take  by mouth 2 times daily. Current Facility-Administered Medications   Medication Dose Route Frequency Provider Last Rate Last Dose    MethylPREDNISolone Acetate SUSP 40 mg  40 mg Injection Once Avelino Reyna MD             Allergies:  Nsaids  History of allergic reaction to anesthesia:  No    Social History:   History   Smoking Status    Former Smoker    Packs/day: 2.50    Years: 15.00    Quit date: 9/1/2015   Smokeless Tobacco    Never Used     Comment: quit 1 yr ago      The patient states he drinks 0 alcoholic beverages per week.     Family History:   Family History   Problem Relation Age of Onset    Cancer ask for letter of cardiac clearance, as he did prior to 8/9/18 lumbar surgery. CBC:   Lab Results   Component Value Date    WBC 7.1 09/17/2018    RBC 4.37 09/17/2018    HGB 13.5 09/17/2018    HCT 41.8 09/17/2018    MCV 95.7 09/17/2018    MCH 31.0 09/17/2018    MCHC 32.4 09/17/2018    RDW 14.5 09/17/2018     09/17/2018    MPV 7.0 09/17/2018     CMP:    Lab Results   Component Value Date     09/17/2018    K 4.6 09/17/2018     09/17/2018    CO2 25 09/17/2018    BUN 15 09/17/2018    CREATININE 1.2 09/17/2018    GFRAA >60 09/17/2018    AGRATIO 2.2 08/16/2018    LABGLOM >60 09/17/2018    GLUCOSE 88 09/17/2018    PROT 6.7 08/16/2018    LABALBU 4.6 08/16/2018    CALCIUM 9.6 09/17/2018    BILITOT 0.4 08/16/2018    ALKPHOS 105 08/16/2018    AST 11 08/16/2018    ALT 12 08/16/2018     PT/INR 1.07  PTT 36.1  A1C 4.9    CXR 9/2017 No acute process    ASSESSMENT AND PLAN:    1. Patient is a 66 y.o. male cleared for the above specified procedure planned on 10/8/2018 with Dr. Tan Kirkpatrick at the [address] [place] [address]. Pt is to see his cardiologist Mon 9/24/18 and will ask for letter of surgical approval from cardiology perspective. 2. Stop Voltaren Gel 7 days prior to procedure. Pt avoids nsaids 2/2 CKD. Christina Yung M.D    [facility] [address]  [address], [address] [facility]    [phone]    Controlled Substances Monitoring:     RX Monitoring 9/17/2018   Attestation The Prescription Monitoring Report for this patient was reviewed today. Documentation Possible medication side effects, risk of tolerance/dependence & alternative treatments discussed. ;No signs of potential drug abuse or diversion identified. Chronic Pain -   Medication Contracts Existing medication contract.

## 2018-09-18 LAB
ESTIMATED AVERAGE GLUCOSE: 93.9 MG/DL
HBA1C MFR BLD: 4.9 %

## 2018-09-20 ENCOUNTER — TELEPHONE (OUTPATIENT)
Dept: PRIMARY CARE CLINIC | Age: 63
End: 2018-09-20

## 2018-09-24 ENCOUNTER — HOSPITAL ENCOUNTER (OUTPATIENT)
Age: 63
Discharge: HOME | End: 2018-09-24
Payer: COMMERCIAL

## 2018-09-24 ENCOUNTER — OFFICE VISIT (OUTPATIENT)
Dept: CARDIOLOGY CLINIC | Age: 63
End: 2018-09-24
Payer: COMMERCIAL

## 2018-09-24 VITALS
WEIGHT: 207 LBS | DIASTOLIC BLOOD PRESSURE: 78 MMHG | HEART RATE: 58 BPM | SYSTOLIC BLOOD PRESSURE: 112 MMHG | HEIGHT: 71 IN | BODY MASS INDEX: 28.98 KG/M2

## 2018-09-24 DIAGNOSIS — I25.5 ISCHEMIC CARDIOMYOPATHY: Primary | ICD-10-CM

## 2018-09-24 DIAGNOSIS — E78.2 MIXED HYPERLIPIDEMIA: ICD-10-CM

## 2018-09-24 LAB
CHOLESTEROL, TOTAL: 149 MG/DL (ref 0–199)
HDLC SERPL-MCNC: 65 MG/DL (ref 40–60)
LDL CHOLESTEROL CALCULATED: 72 MG/DL
TRIGL SERPL-MCNC: 58 MG/DL (ref 0–150)
VLDLC SERPL CALC-MCNC: 12 MG/DL

## 2018-09-24 PROCEDURE — 36415 COLL VENOUS BLD VENIPUNCTURE: CPT

## 2018-09-24 PROCEDURE — 80061 LIPID PANEL: CPT

## 2018-09-24 PROCEDURE — 99214 OFFICE O/P EST MOD 30 MIN: CPT | Performed by: INTERNAL MEDICINE

## 2018-09-24 RX ORDER — ATORVASTATIN CALCIUM 80 MG/1
80 TABLET, FILM COATED ORAL DAILY
Qty: 30 TABLET | Refills: 11 | Status: SHIPPED | OUTPATIENT
Start: 2018-09-24 | End: 2019-03-22 | Stop reason: SDUPTHER

## 2018-09-24 RX ORDER — METOPROLOL SUCCINATE 25 MG/1
25 TABLET, EXTENDED RELEASE ORAL DAILY
Qty: 30 TABLET | Refills: 11 | Status: SHIPPED | OUTPATIENT
Start: 2018-09-24 | End: 2019-03-22 | Stop reason: SDUPTHER

## 2018-09-24 RX ORDER — AMOXICILLIN 500 MG/1
500 CAPSULE ORAL
COMMUNITY
End: 2018-11-19

## 2018-09-24 NOTE — PROGRESS NOTES
to stenosis id non-viable. Ischemic cardiomyopathy:  All testing c/w prior infarct. No ischemia on myoview and no angina. Class 1. Continue toprol to 75 and Cozaar 100      Hyperlipidemia:  Lipitor 80.  Needs lipid profile    Plan:  Same rx  Check lipids  F/u 1 year    Thank you for allowing me to participate in the care of this individual.      Shikha Rock M.D., [facility]

## 2018-09-24 NOTE — LETTER
[address] [place] [address] [address] [address]  Phone: [phone]  Fax: [phone]    China Beal MD        September 24, 2018     Michael Enciso    Patient: Molly Frey  MR Number: [id number]  YOB: 1955  Date of Visit: 9/24/2018    Dear Dr. Yeyo Diaz:        Tae Ponce     Referring Provider:  Yeyo Diaz MD     Chief Complaint   Patient presents with    Cardiomyopathy     No cardiac complaints    Hyperlipidemia    Hypertension        History of Present Illness:  67 y/o male seen in f/u for  CAD and LV dysfunction. He was seen by PCP for preop for back surgery with abnormal EKG. ECHO obtained and showed evidence of anterior infarct. Cath with severe mid-distal LAD lesion with anterior hypokinesis. No angina. Stress 5/2018 to see if all infarcted or if ischemia present. Stress shows anterior scar and no ischemia. He is doing well from a cardiac standpoint. No chest discomfort or dyspnea. Tolerated back surgery without issue. Now planning on knee surgery. Past Medical History:   has a past medical history of Bipolar 2 disorder ([facility] [address] CAD (coronary artery disease), native coronary artery; Chronic back pain; DDD (degenerative disc disease), cervical; DDD (degenerative disc disease), lumbar; DM2 (diabetes mellitus, type 2) ([address] Elevated LFTs; HLD (hyperlipidemia); HTN (hypertension); Tachycardia; Tobacco use; Ulcerative colitis ([address] and Vitamin D deficiency. Surgical History:   has a past surgical history that includes Nerve Block (2005); Lumbar spine surgery (2007, 8/9/2018); knee surgery (Right, 1978); Tonsillectomy (1962); Colonoscopy (2000); and Colonoscopy (03/20/2018). Social History:   reports that he quit smoking about 3 years ago. He has a 37.50 pack-year smoking history.  He has never used smokeless tobacco. He reports that he does not drink alcohol or use drugs. Family History:  Negative for premature CAD     Allergies:  Nsaids     Home Medications:  Prior to Visit Medications    Medication Sig Taking? Authorizing Provider   amoxicillin (AMOXIL) 500 MG capsule Take 500 mg by mouth 4 capsules by mouth 1 hr prior to procedure Yes Historical Provider, MD   Aspirin (ASPIR-81 PO) Take by mouth Yes Historical Provider, MD   NONFORMULARY Axeron testosterone 30 mg  1 pump to each arm daily Yes Historical Provider, MD   ranitidine (ZANTAC) 150 MG tablet Take 1 tablet by mouth 2 times daily To replace omeprozole Yes Consuelo Nesbitt MD   HYDROcodone-acetaminophen (NORCO) 5-325 MG per tablet Take 1 tablet by mouth 4 times daily as needed for Pain for up to 30 days. Fill 11/5/17.  Yes Consuelo Nesbitt MD   diclofenac sodium 1 % GEL apply 4 grams to affected area four times a day Yes Dhruv De La Garza MD   fluticasone (FLONASE) 50 MCG/ACT nasal spray instill 2 sprays into each nostril once daily Yes Dhruv De La Garza MD   metoprolol succinate (TOPROL XL) 50 MG extended release tablet Take 1.5 tablets by mouth daily Yes Edmund Segovia MD   atorvastatin (LIPITOR) 80 MG tablet Take 1 tablet by mouth daily Yes Edmund Segovia MD   cyclobenzaprine (FLEXERIL) 10 MG tablet Take 1 tablet by mouth 3 times daily as needed for Muscle spasms Yes Consuelo Nesbitt MD   montelukast (SINGULAIR) 10 MG tablet Take 1 tablet by mouth nightly Yes Consuelo Nesbitt MD   losartan (COZAAR) 100 MG tablet Take 1 tablet by mouth daily Yes Consuelo Nesbitt MD   ipratropium (ATROVENT) 0.06 % nasal spray 2 sprays bid to b/l nostrils Yes Consuelo Nesbitt MD   buPROPion (WELLBUTRIN XL) 150 MG extended release tablet Take 150 mg by mouth every morning Yes Historical Provider, MD   buPROPion (WELLBUTRIN XL) 300 MG extended release tablet Take 300 mg by mouth every morning Yes Historical Provider, MD   ondansetron (ZOFRAN ODT) 4 MG disintegrating tablet Take 1 tablet by mouth every 8 hours as needed for Nausea or Vomiting Yes Gurmeet Stacy MD   sildenafil (VIAGRA) 100 MG tablet Take 1 tablet by mouth as needed for Erectile Dysfunction Yes Gurmeet Stacy MD   Glucose Blood (BLOOD GLUCOSE TEST STRIPS) STRP One touch ultra 2;Checks blood sugar bid Yes Gurmeet Stacy MD       [x] Medications and dosages reviewed. ROS:  [x]Full ROS obtained and negative except as mentioned in HPI      Physical Examination:    Vitals:    09/21/18 1620   BP: 112/78   Pulse: 58      /78 (Site: Left Upper Arm, Position: Sitting, Cuff Size: Large Adult)   Pulse 58   Ht 5' 10.5\" (1.791 m)   Wt 207 lb (93.9 kg)   BMI 29.28 kg/m²      · GENERAL: Well developed, well nourished, No acute distress  · NEUROLOGICAL: Alert and oriented  · PSYCH: Calm affect  · SKIN: Warm and dry, no rash  · HEENT: Normocephalic, Sclera non-icteric, mucus membranes moist  · NECK: supple, JVP normal  · CAROTID: Normal upstroke, no bruits  · CARDIAC: Normal PMI, Regular rate and rhythm, normal S1S2, no murmur, rub, or gallop  · RESPIRATORY: Normal respiratory effort, clear to auscultation bilaterally  · EXTREMITIES: No edema  · MUSCULOSKELETAL: No joint swelling or tenderness, no chest wall tenderness  · GASTROINTESTINAL: normal bowel sounds, soft, non-tender, no bruit    EKG: Sinus, RBBB, Septal and inferior infarcts. ECHO    -Normal left ventricle size.   -Left ventricular systolic dysfunction with an estimated ejection fraction   of 40%. There is akinesis of the distal septum and apex.   -There is mild concentric left ventricular hypertrophy.   -Trivial tricuspid regurgitation. Myoview   Summary    Large sized anteroapical minimally reversible defect of moderate intensity    consistent with infarction in the territory of the proximal LAD .    Small sized inferior fixed defect of moderate intensity consistent with    infarction in the territory of the proximal RCA .  Enlarged LV with EF 37 % and anteroseptal akinesis and inferior akinesis         Assessment:     CAD  Prior infarct with mild-moderate LV dysfunction. Stable cardiac status  Same meds    LM: Normal  LAD: Prox and mid 30%. Mid-distal 95% eccentric  LCX: large, dominant, Mild disease  RCA: small non-dominant  LV: apical hypokinesis. EF=45-50%     IMP: severe mid-distal LAD stenosis with corresponding wall motion abnormalitiy. Suspect myocardium distal to stenosis id non-viable. Ischemic cardiomyopathy:  All testing c/w prior infarct. No ischemia on myoview and no angina. Class 1. Continue toprol to 75 and Cozaar 100      Hyperlipidemia:  Lipitor 80. Needs lipid profile    Plan:  Same rx  Check lipids  F/u 1 year    Thank you for allowing me to participate in the care of this individual.      Juli Ernandez M.D., [facility]       If you have questions, please do not hesitate to call me. I look forward to following Win Rodriguez along with you.     Sincerely,        King Saad MD

## 2018-09-24 NOTE — COMMUNICATION BODY
Provider, MD   NONFORMULARY Axeron testosterone 30 mg  1 pump to each arm daily Yes Historical Provider, MD   ranitidine (ZANTAC) 150 MG tablet Take 1 tablet by mouth 2 times daily To replace omeprozole Yes Sudheer Mora MD   HYDROcodone-acetaminophen (NORCO) 5-325 MG per tablet Take 1 tablet by mouth 4 times daily as needed for Pain for up to 30 days. Fill 11/5/17. Yes Sudheer Mora MD   diclofenac sodium 1 % GEL apply 4 grams to affected area four times a day Yes Ricky Conklin MD   fluticasone (FLONASE) 50 MCG/ACT nasal spray instill 2 sprays into each nostril once daily Yes Ricky Conklin MD   metoprolol succinate (TOPROL XL) 50 MG extended release tablet Take 1.5 tablets by mouth daily Yes Lisa Rodriguez MD   atorvastatin (LIPITOR) 80 MG tablet Take 1 tablet by mouth daily Yes Lisa Rodriguez MD   cyclobenzaprine (FLEXERIL) 10 MG tablet Take 1 tablet by mouth 3 times daily as needed for Muscle spasms Yes Sudheer Mora MD   montelukast (SINGULAIR) 10 MG tablet Take 1 tablet by mouth nightly Yes Sudheer Mora MD   losartan (COZAAR) 100 MG tablet Take 1 tablet by mouth daily Yes Sudheer Mora MD   ipratropium (ATROVENT) 0.06 % nasal spray 2 sprays bid to b/l nostrils Yes Sudheer Mora MD   buPROPion (WELLBUTRIN XL) 150 MG extended release tablet Take 150 mg by mouth every morning Yes Historical Provider, MD   buPROPion (WELLBUTRIN XL) 300 MG extended release tablet Take 300 mg by mouth every morning Yes Historical Provider, MD   ondansetron (ZOFRAN ODT) 4 MG disintegrating tablet Take 1 tablet by mouth every 8 hours as needed for Nausea or Vomiting Yes Sudheer Mora MD   sildenafil (VIAGRA) 100 MG tablet Take 1 tablet by mouth as needed for Erectile Dysfunction Yes Sudheer Mora MD   Glucose Blood (BLOOD GLUCOSE TEST STRIPS) STRP One touch ultra 2;Checks blood sugar bid Yes Sudheer Mora MD       [x] Medications and dosages reviewed.     ROS:  [x]Full ROS obtained and negative except as mentioned in HPI      Physical Examination:    Vitals:    09/21/18 1620   BP: 112/78   Pulse: 58      /78 (Site: Left Upper Arm, Position: Sitting, Cuff Size: Large Adult)   Pulse 58   Ht 5' 10.5\" (1.791 m)   Wt 207 lb (93.9 kg)   BMI 29.28 kg/m²      · GENERAL: Well developed, well nourished, No acute distress  · NEUROLOGICAL: Alert and oriented  · PSYCH: Calm affect  · SKIN: Warm and dry, no rash  · HEENT: Normocephalic, Sclera non-icteric, mucus membranes moist  · NECK: supple, JVP normal  · CAROTID: Normal upstroke, no bruits  · CARDIAC: Normal PMI, Regular rate and rhythm, normal S1S2, no murmur, rub, or gallop  · RESPIRATORY: Normal respiratory effort, clear to auscultation bilaterally  · EXTREMITIES: No edema  · MUSCULOSKELETAL: No joint swelling or tenderness, no chest wall tenderness  · GASTROINTESTINAL: normal bowel sounds, soft, non-tender, no bruit    EKG: Sinus, RBBB, Septal and inferior infarcts. ECHO    -Normal left ventricle size.   -Left ventricular systolic dysfunction with an estimated ejection fraction   of 40%. There is akinesis of the distal septum and apex.   -There is mild concentric left ventricular hypertrophy.   -Trivial tricuspid regurgitation. Myoview   Summary    Large sized anteroapical minimally reversible defect of moderate intensity    consistent with infarction in the territory of the proximal LAD .    Small sized inferior fixed defect of moderate intensity consistent with    infarction in the territory of the proximal RCA .    Enlarged LV with EF 37 % and anteroseptal akinesis and inferior akinesis         Assessment:     CAD  Prior infarct with mild-moderate LV dysfunction. Stable cardiac status  Same meds    LM: Normal  LAD: Prox and mid 30%. Mid-distal 95% eccentric  LCX: large, dominant, Mild disease  RCA: small non-dominant  LV: apical hypokinesis. EF=45-50%     IMP: severe mid-distal LAD stenosis with corresponding wall motion abnormalitiy.   Suspect myocardium distal to stenosis id non-viable. Ischemic cardiomyopathy:  All testing c/w prior infarct. No ischemia on myoview and no angina. Class 1. Continue toprol to 75 and Cozaar 100      Hyperlipidemia:  Lipitor 80.  Needs lipid profile    Plan:  Same rx  Check lipids  F/u 1 year    Thank you for allowing me to participate in the care of this individual.      Deborah Elias M.D., [facility]

## 2018-09-26 ENCOUNTER — PATIENT MESSAGE (OUTPATIENT)
Dept: FAMILY MEDICINE CLINIC | Age: 63
End: 2018-09-26

## 2018-09-26 ENCOUNTER — TELEPHONE (OUTPATIENT)
Dept: CARDIOLOGY CLINIC | Age: 63
End: 2018-09-26

## 2018-09-26 NOTE — TELEPHONE ENCOUNTER
From: Olinda Sutton  To: Consuelo Nesbitt MD  Sent: 9/26/2018 4:30 PM EDT  Subject: Non-Urgent Medical Question    Dear Dr. Elin Valles,    Please cancel my thyroid ultrasound referral ([id number]) to Dr. Chery . Nadege Beltre MD, and reissue it for [facility], with the results reported to you. Only if necessary, please refer me to:  Dr. Shruthi Peters MD  [address] [place] [address] [address]  [facility], [address] [place] [address]  [phone]  My preference is to see him only if the ultrasound finds a reason to do so. (ENT Dr. Chery . [facility]  told me that they do not do ultrasounds, but would send me to either [facility] or [facility]. My insurance will not accept [facility]. I would not go to [facility] for any reason.)    Thank Priscilla Matos.  [email or web address] com  [phone]

## 2018-09-26 NOTE — TELEPHONE ENCOUNTER
MM[facility] reviewed. Cholesterol numbers look good. Continue current rx and FU as planned. LMOM for patient.

## 2018-10-01 ENCOUNTER — PATIENT MESSAGE (OUTPATIENT)
Dept: FAMILY MEDICINE CLINIC | Age: 63
End: 2018-10-01

## 2018-10-03 DIAGNOSIS — J30.5 CHRONIC ALLERGIC RHINITIS DUE TO FOOD: ICD-10-CM

## 2018-10-03 DIAGNOSIS — M62.838 NECK MUSCLE SPASM: ICD-10-CM

## 2018-10-03 DIAGNOSIS — I10 BENIGN ESSENTIAL HTN: ICD-10-CM

## 2018-10-04 RX ORDER — LOSARTAN POTASSIUM 100 MG/1
TABLET ORAL
Qty: 30 TABLET | Refills: 5 | Status: SHIPPED | OUTPATIENT
Start: 2018-10-04 | End: 2019-03-20 | Stop reason: SDUPTHER

## 2018-10-04 RX ORDER — CYCLOBENZAPRINE HCL 10 MG
TABLET ORAL
Qty: 90 TABLET | Refills: 5 | Status: SHIPPED | OUTPATIENT
Start: 2018-10-04 | End: 2019-08-05 | Stop reason: ALTCHOICE

## 2018-10-04 RX ORDER — MONTELUKAST SODIUM 10 MG/1
10 TABLET ORAL NIGHTLY
Qty: 30 TABLET | Refills: 5 | Status: SHIPPED | OUTPATIENT
Start: 2018-10-04 | End: 2019-03-20 | Stop reason: SDUPTHER

## 2018-10-18 ENCOUNTER — PATIENT MESSAGE (OUTPATIENT)
Dept: FAMILY MEDICINE CLINIC | Age: 63
End: 2018-10-18

## 2018-10-18 ENCOUNTER — TELEPHONE (OUTPATIENT)
Dept: FAMILY MEDICINE CLINIC | Age: 63
End: 2018-10-18

## 2018-10-18 ENCOUNTER — OFFICE VISIT (OUTPATIENT)
Dept: FAMILY MEDICINE CLINIC | Age: 63
End: 2018-10-18
Payer: COMMERCIAL

## 2018-10-18 VITALS
WEIGHT: 219 LBS | SYSTOLIC BLOOD PRESSURE: 96 MMHG | OXYGEN SATURATION: 97 % | HEIGHT: 71 IN | HEART RATE: 67 BPM | DIASTOLIC BLOOD PRESSURE: 60 MMHG | BODY MASS INDEX: 30.66 KG/M2 | RESPIRATION RATE: 16 BRPM

## 2018-10-18 DIAGNOSIS — G89.4 CHRONIC PAIN SYNDROME: ICD-10-CM

## 2018-10-18 DIAGNOSIS — Z23 NEED FOR INFLUENZA VACCINATION: ICD-10-CM

## 2018-10-18 DIAGNOSIS — M54.16 LUMBAR RADICULOPATHY: ICD-10-CM

## 2018-10-18 DIAGNOSIS — M17.10 PRIMARY LOCALIZED OSTEOARTHRITIS OF KNEE: Primary | ICD-10-CM

## 2018-10-18 PROCEDURE — 90686 IIV4 VACC NO PRSV 0.5 ML IM: CPT | Performed by: FAMILY MEDICINE

## 2018-10-18 PROCEDURE — 90471 IMMUNIZATION ADMIN: CPT | Performed by: FAMILY MEDICINE

## 2018-10-18 PROCEDURE — 99213 OFFICE O/P EST LOW 20 MIN: CPT | Performed by: FAMILY MEDICINE

## 2018-10-18 RX ORDER — HYDROCODONE BITARTRATE AND ACETAMINOPHEN 5; 325 MG/1; MG/1
1 TABLET ORAL 4 TIMES DAILY PRN
Qty: 120 TABLET | Refills: 0 | Status: SHIPPED | OUTPATIENT
Start: 2018-10-18 | End: 2018-11-17

## 2018-10-18 ASSESSMENT — PATIENT HEALTH QUESTIONNAIRE - PHQ9
SUM OF ALL RESPONSES TO PHQ QUESTIONS 1-9: 0
1. LITTLE INTEREST OR PLEASURE IN DOING THINGS: 0
2. FEELING DOWN, DEPRESSED OR HOPELESS: 0
SUM OF ALL RESPONSES TO PHQ9 QUESTIONS 1 & 2: 0
SUM OF ALL RESPONSES TO PHQ QUESTIONS 1-9: 0

## 2018-10-18 NOTE — PROGRESS NOTES
Patient: Mona Mendieta is a 66 y.o.male who presents today with the following Chief Complaint(s):  Chief Complaint   Patient presents with    1 Month Follow-Up     GERD, Chronic Pain, DM II         HPI: S/P R TKR 10/8/18. Avoids nsaids 2/2 kidney injury and that back surgeon asked him to avoid x 6 mo. Ran out of norco from surgeon who rx'd 42 norco 5 mg 1 q4 hr, up to 6 day. Has f/u with surgeon in 1 wk. Hopes to be off pain meds soon. Back pain is under control since recent surgery. [place]pt [place]. Is using crutches. Has TENS unit to lessen pain RLE. Current Outpatient Prescriptions   Medication Sig Dispense Refill    Rivaroxaban (XARELTO PO) Take by mouth      HYDROcodone-acetaminophen (NORCO) 5-325 MG per tablet Take 1 tablet by mouth 4 times daily as needed for Pain for up to 30 days.  Fill 11/5/17. 120 tablet 0    cyclobenzaprine (FLEXERIL) 10 MG tablet take 1 tablet by mouth three times a day if needed 90 tablet 5    montelukast (SINGULAIR) 10 MG tablet take 1 tablet by mouth NIGHTLY 30 tablet 5    losartan (COZAAR) 100 MG tablet take 1 tablet by mouth once daily 30 tablet 5    amoxicillin (AMOXIL) 500 MG capsule Take 500 mg by mouth 4 capsules by mouth 1 hr prior to procedure      Aspirin (ASPIR-81 PO) Take by mouth      NONFORMULARY Axeron testosterone 30 mg  1 pump to each arm daily      atorvastatin (LIPITOR) 80 MG tablet Take 1 tablet by mouth daily 30 tablet 11    metoprolol succinate (TOPROL XL) 25 MG extended release tablet Take 1 tablet by mouth daily 30 tablet 11    ranitidine (ZANTAC) 150 MG tablet Take 1 tablet by mouth 2 times daily To replace omeprozole 60 tablet 5    fluticasone (FLONASE) 50 MCG/ACT nasal spray instill 2 sprays into each nostril once daily 16 g 6    metoprolol succinate (TOPROL XL) 50 MG extended release tablet Take 1.5 tablets by mouth daily (Patient taking differently: Take 50 mg by mouth daily Take 50 mg with 25 mg together to

## 2018-11-12 DIAGNOSIS — J34.89 RHINORRHEA: ICD-10-CM

## 2018-11-12 RX ORDER — IPRATROPIUM BROMIDE 42 UG/1
SPRAY, METERED NASAL
Qty: 15 ML | Refills: 5 | Status: SHIPPED | OUTPATIENT
Start: 2018-11-12 | End: 2019-02-18 | Stop reason: SDUPTHER

## 2018-11-12 NOTE — TELEPHONE ENCOUNTER
LOV 9/17/2018    Future Appointments  Date Time Provider Bro Gavin   11/19/2018 2:15 PM MD JAYLEEN Sanderson

## 2018-11-19 ENCOUNTER — OFFICE VISIT (OUTPATIENT)
Dept: FAMILY MEDICINE CLINIC | Age: 63
End: 2018-11-19
Payer: COMMERCIAL

## 2018-11-19 ENCOUNTER — TELEPHONE (OUTPATIENT)
Dept: FAMILY MEDICINE CLINIC | Age: 63
End: 2018-11-19

## 2018-11-19 VITALS
HEART RATE: 80 BPM | BODY MASS INDEX: 31.22 KG/M2 | DIASTOLIC BLOOD PRESSURE: 60 MMHG | SYSTOLIC BLOOD PRESSURE: 96 MMHG | HEIGHT: 71 IN | OXYGEN SATURATION: 96 % | WEIGHT: 223 LBS

## 2018-11-19 DIAGNOSIS — E11.9 CONTROLLED TYPE 2 DIABETES MELLITUS WITHOUT COMPLICATION, WITHOUT LONG-TERM CURRENT USE OF INSULIN (HCC): Primary | ICD-10-CM

## 2018-11-19 DIAGNOSIS — G89.18 POST-OP PAIN: ICD-10-CM

## 2018-11-19 PROCEDURE — 99213 OFFICE O/P EST LOW 20 MIN: CPT | Performed by: FAMILY MEDICINE

## 2018-11-19 RX ORDER — HYDROCODONE BITARTRATE AND ACETAMINOPHEN 5; 325 MG/1; MG/1
1 TABLET ORAL EVERY 6 HOURS PRN
Qty: 120 TABLET | Refills: 0 | Status: SHIPPED | OUTPATIENT
Start: 2018-11-19 | End: 2018-12-19 | Stop reason: SDUPTHER

## 2018-11-19 NOTE — PROGRESS NOTES
Patient: Zhang Pedersen is a 66 y.o.male who presents today with the following Chief Complaint(s):  Chief Complaint   Patient presents with    1 Month Follow-Up     post op visit after (R) knee surgery          HPI: Pt is s/p L4-5 discectomy 8/2018 and R TKR 10/2018. Pt was released from PT for R knee 1 wk ago. Has f/u with knee surgery 10/30. Wears spinal stimulator device on abd and back x 2 hr q d, will do x 6 mo to stim vertebral healing. Is weaning norco. Has weaned by 20%, using 1 qid. If weather cooperates, has gotten by with 1 1/2 norco qd. Pt feels that if he could take nsaids, he may not need norco. Is unable to take nsaids x 6 mo per back surgeon and b/c of CRI and hx of colitis, avoids nsaids. Has joined gym now that PT is over. Will start walking. Has thyr nodule, saw Prasanth Espino ENT. Is awaiting [facility] call to schedule FNA. Plans a diabetic eye exam next yr 2/2 cost. Had 1 yr ago, vision stable. Is seeing dentist to care for old cracks in teeth. Had not seen dentist x 42 yr when in [place].        Lab Results   Component Value Date    LABA1C 4.9 09/17/2018    LABA1C 4.8 08/16/2018    LABA1C 5.4 04/25/2018     :        Current Outpatient Prescriptions   Medication Sig Dispense Refill    ipratropium (ATROVENT) 0.06 % nasal spray instill 2 sprays into each nostril twice a day 15 mL 5    Rivaroxaban (XARELTO PO) Take by mouth      cyclobenzaprine (FLEXERIL) 10 MG tablet take 1 tablet by mouth three times a day if needed 90 tablet 5    montelukast (SINGULAIR) 10 MG tablet take 1 tablet by mouth NIGHTLY 30 tablet 5    losartan (COZAAR) 100 MG tablet take 1 tablet by mouth once daily 30 tablet 5    Aspirin (ASPIR-81 PO) Take by mouth      NONFORMULARY Axeron testosterone 30 mg  1 pump to each arm daily      atorvastatin (LIPITOR) 80 MG tablet Take 1 tablet by mouth daily 30 tablet 11    metoprolol succinate (TOPROL XL) 25 MG extended release tablet Take 1 tablet by

## 2018-12-05 ENCOUNTER — HOSPITAL ENCOUNTER (OUTPATIENT)
Dept: ULTRASOUND IMAGING | Age: 63
Discharge: HOME | End: 2018-12-05
Payer: COMMERCIAL

## 2018-12-05 VITALS
RESPIRATION RATE: 18 BRPM | TEMPERATURE: 98.4 F | WEIGHT: 220 LBS | HEART RATE: 76 BPM | OXYGEN SATURATION: 98 % | SYSTOLIC BLOOD PRESSURE: 142 MMHG | DIASTOLIC BLOOD PRESSURE: 86 MMHG | BODY MASS INDEX: 30.8 KG/M2 | HEIGHT: 71 IN

## 2018-12-05 DIAGNOSIS — E04.2 MULTINODULAR GOITER: ICD-10-CM

## 2018-12-05 PROCEDURE — 6370000000 HC RX 637 (ALT 250 FOR IP): Performed by: OTOLARYNGOLOGY

## 2018-12-05 PROCEDURE — 88173 CYTOPATH EVAL FNA REPORT: CPT

## 2018-12-05 PROCEDURE — 88305 TISSUE EXAM BY PATHOLOGIST: CPT

## 2018-12-05 PROCEDURE — 10022 US FINE NEEDLE ASPIRATION: CPT

## 2018-12-05 PROCEDURE — 2500000003 HC RX 250 WO HCPCS: Performed by: OTOLARYNGOLOGY

## 2018-12-05 RX ORDER — BACITRACIN, NEOMYCIN, POLYMYXIN B 400; 3.5; 5 [USP'U]/G; MG/G; [USP'U]/G
OINTMENT TOPICAL ONCE
Status: COMPLETED | OUTPATIENT
Start: 2018-12-05 | End: 2018-12-05

## 2018-12-05 RX ORDER — LIDOCAINE HYDROCHLORIDE 10 MG/ML
5 INJECTION, SOLUTION EPIDURAL; INFILTRATION; INTRACAUDAL; PERINEURAL ONCE
Status: COMPLETED | OUTPATIENT
Start: 2018-12-05 | End: 2018-12-05

## 2018-12-05 RX ADMIN — BACITRACIN, NEOMYCIN, POLYMYXIN B: 400; 3.5; 5 OINTMENT TOPICAL at 12:35

## 2018-12-05 RX ADMIN — LIDOCAINE HYDROCHLORIDE 5 ML: 10 INJECTION, SOLUTION EPIDURAL; INFILTRATION; INTRACAUDAL; PERINEURAL at 12:10

## 2018-12-05 ASSESSMENT — PAIN - FUNCTIONAL ASSESSMENT: PAIN_FUNCTIONAL_ASSESSMENT: 0-10

## 2018-12-05 NOTE — PROGRESS NOTES
Pt arrives to pre procedure area in stable condition from home. Vital signs stable. Assessment completed see flow sheet. Procedure explained to pt who states understanding and questions answered. Consent signed.

## 2018-12-19 ENCOUNTER — OFFICE VISIT (OUTPATIENT)
Dept: FAMILY MEDICINE CLINIC | Age: 63
End: 2018-12-19
Payer: COMMERCIAL

## 2018-12-19 VITALS
BODY MASS INDEX: 32.34 KG/M2 | HEART RATE: 74 BPM | DIASTOLIC BLOOD PRESSURE: 84 MMHG | WEIGHT: 231 LBS | RESPIRATION RATE: 16 BRPM | SYSTOLIC BLOOD PRESSURE: 126 MMHG | HEIGHT: 71 IN | OXYGEN SATURATION: 98 %

## 2018-12-19 DIAGNOSIS — G89.18 POST-OP PAIN: ICD-10-CM

## 2018-12-19 DIAGNOSIS — K21.9 GASTROESOPHAGEAL REFLUX DISEASE WITHOUT ESOPHAGITIS: ICD-10-CM

## 2018-12-19 DIAGNOSIS — F11.93 WITHDRAWAL FROM OPIOIDS (HCC): ICD-10-CM

## 2018-12-19 DIAGNOSIS — N18.30 CHRONIC RENAL IMPAIRMENT, STAGE 3 (MODERATE) (HCC): ICD-10-CM

## 2018-12-19 DIAGNOSIS — R11.0 NAUSEA: ICD-10-CM

## 2018-12-19 DIAGNOSIS — E11.9 CONTROLLED TYPE 2 DIABETES MELLITUS WITHOUT COMPLICATION, WITHOUT LONG-TERM CURRENT USE OF INSULIN (HCC): Primary | ICD-10-CM

## 2018-12-19 LAB
ANION GAP SERPL CALCULATED.3IONS-SCNC: 13 MMOL/L (ref 3–16)
BUN BLDV-MCNC: 30 MG/DL (ref 7–20)
CALCIUM SERPL-MCNC: 9.7 MG/DL (ref 8.3–10.6)
CHLORIDE BLD-SCNC: 104 MMOL/L (ref 99–110)
CO2: 26 MMOL/L (ref 21–32)
CREAT SERPL-MCNC: 1.5 MG/DL (ref 0.8–1.3)
GFR AFRICAN AMERICAN: 57
GFR NON-AFRICAN AMERICAN: 47
GLUCOSE BLD-MCNC: 101 MG/DL (ref 70–99)
POTASSIUM SERPL-SCNC: 5.5 MMOL/L (ref 3.5–5.1)
SODIUM BLD-SCNC: 143 MMOL/L (ref 136–145)

## 2018-12-19 PROCEDURE — 36415 COLL VENOUS BLD VENIPUNCTURE: CPT | Performed by: FAMILY MEDICINE

## 2018-12-19 PROCEDURE — 99214 OFFICE O/P EST MOD 30 MIN: CPT | Performed by: FAMILY MEDICINE

## 2018-12-19 RX ORDER — CLONIDINE HYDROCHLORIDE 0.1 MG/1
0.1 TABLET ORAL NIGHTLY
Qty: 30 TABLET | Refills: 1 | Status: SHIPPED | OUTPATIENT
Start: 2018-12-19 | End: 2019-01-16

## 2018-12-19 RX ORDER — HYDROCODONE BITARTRATE AND ACETAMINOPHEN 5; 325 MG/1; MG/1
1 TABLET ORAL EVERY 6 HOURS PRN
Qty: 104 TABLET | Refills: 0 | Status: SHIPPED | OUTPATIENT
Start: 2018-12-19 | End: 2019-01-16 | Stop reason: SDUPTHER

## 2018-12-19 ASSESSMENT — ENCOUNTER SYMPTOMS
WHEEZING: 0
SHORTNESS OF BREATH: 0
ABDOMINAL DISTENTION: 0
COUGH: 0
DIARRHEA: 0
ABDOMINAL PAIN: 0
CHEST TIGHTNESS: 0
VOMITING: 1
NAUSEA: 1
BLOOD IN STOOL: 0
BACK PAIN: 0
CONSTIPATION: 0
EYES NEGATIVE: 1
COLOR CHANGE: 0

## 2018-12-19 NOTE — PROGRESS NOTES
MG tablet Take 1 tablet by mouth nightly 30 tablet 1    ipratropium (ATROVENT) 0.06 % nasal spray instill 2 sprays into each nostril twice a day 15 mL 5    cyclobenzaprine (FLEXERIL) 10 MG tablet take 1 tablet by mouth three times a day if needed 90 tablet 5    montelukast (SINGULAIR) 10 MG tablet take 1 tablet by mouth NIGHTLY 30 tablet 5    losartan (COZAAR) 100 MG tablet take 1 tablet by mouth once daily 30 tablet 5    Aspirin (ASPIR-81 PO) Take by mouth      NONFORMULARY Axeron testosterone 30 mg  1 pump to each arm daily      atorvastatin (LIPITOR) 80 MG tablet Take 1 tablet by mouth daily 30 tablet 11    metoprolol succinate (TOPROL XL) 25 MG extended release tablet Take 1 tablet by mouth daily 30 tablet 11    ranitidine (ZANTAC) 150 MG tablet Take 1 tablet by mouth 2 times daily To replace omeprozole 60 tablet 5    diclofenac sodium 1 % GEL apply 4 grams to affected area four times a day 500 g 5    fluticasone (FLONASE) 50 MCG/ACT nasal spray instill 2 sprays into each nostril once daily 16 g 6    metoprolol succinate (TOPROL XL) 50 MG extended release tablet Take 1.5 tablets by mouth daily (Patient taking differently: Take 50 mg by mouth daily Take 50 mg with 25 mg together to equal 75 mg daily) 45 tablet 11    buPROPion (WELLBUTRIN XL) 150 MG extended release tablet Take 150 mg by mouth every morning      buPROPion (WELLBUTRIN XL) 300 MG extended release tablet Take 300 mg by mouth every morning      sildenafil (VIAGRA) 100 MG tablet Take 1 tablet by mouth as needed for Erectile Dysfunction 30 tablet 1    Glucose Blood (BLOOD GLUCOSE TEST STRIPS) STRP One touch ultra 2;Checks blood sugar bid 100 strip 5     Current Facility-Administered Medications   Medication Dose Route Frequency Provider Last Rate Last Dose    MethylPREDNISolone Acetate SUSP 40 mg  40 mg Injection Once Carline Conway MD           Patient's past medical history,surgical history, family history, medications,  and

## 2018-12-20 LAB
ESTIMATED AVERAGE GLUCOSE: 131.2 MG/DL
HBA1C MFR BLD: 6.2 %

## 2019-01-16 ENCOUNTER — OFFICE VISIT (OUTPATIENT)
Dept: FAMILY MEDICINE CLINIC | Age: 64
End: 2019-01-16
Payer: COMMERCIAL

## 2019-01-16 VITALS
DIASTOLIC BLOOD PRESSURE: 88 MMHG | HEART RATE: 76 BPM | HEIGHT: 71 IN | OXYGEN SATURATION: 98 % | RESPIRATION RATE: 16 BRPM | WEIGHT: 231 LBS | SYSTOLIC BLOOD PRESSURE: 130 MMHG | BODY MASS INDEX: 32.34 KG/M2

## 2019-01-16 DIAGNOSIS — N18.30 CRI (CHRONIC RENAL INSUFFICIENCY), STAGE 3 (MODERATE) (HCC): ICD-10-CM

## 2019-01-16 DIAGNOSIS — G89.18 POST-OP PAIN: ICD-10-CM

## 2019-01-16 DIAGNOSIS — E11.9 CONTROLLED TYPE 2 DIABETES MELLITUS WITHOUT COMPLICATION, WITHOUT LONG-TERM CURRENT USE OF INSULIN (HCC): Primary | ICD-10-CM

## 2019-01-16 DIAGNOSIS — F11.93 WITHDRAWAL FROM OPIOIDS (HCC): ICD-10-CM

## 2019-01-16 PROCEDURE — 36415 COLL VENOUS BLD VENIPUNCTURE: CPT | Performed by: FAMILY MEDICINE

## 2019-01-16 PROCEDURE — 99214 OFFICE O/P EST MOD 30 MIN: CPT | Performed by: FAMILY MEDICINE

## 2019-01-16 RX ORDER — TAMSULOSIN HYDROCHLORIDE 0.4 MG/1
0.4 CAPSULE ORAL DAILY
COMMUNITY
End: 2019-08-05

## 2019-01-16 RX ORDER — HYDROCODONE BITARTRATE AND ACETAMINOPHEN 5; 325 MG/1; MG/1
1 TABLET ORAL EVERY 6 HOURS PRN
Qty: 90 TABLET | Refills: 0 | Status: SHIPPED | OUTPATIENT
Start: 2019-01-16 | End: 2019-02-15

## 2019-01-17 LAB
ANION GAP SERPL CALCULATED.3IONS-SCNC: 16 MMOL/L (ref 3–16)
BUN BLDV-MCNC: 18 MG/DL (ref 7–20)
CALCIUM SERPL-MCNC: 10.2 MG/DL (ref 8.3–10.6)
CHLORIDE BLD-SCNC: 97 MMOL/L (ref 99–110)
CO2: 24 MMOL/L (ref 21–32)
CREAT SERPL-MCNC: 1.4 MG/DL (ref 0.8–1.3)
GFR AFRICAN AMERICAN: >60
GFR NON-AFRICAN AMERICAN: 51
GLUCOSE BLD-MCNC: 94 MG/DL (ref 70–99)
POTASSIUM SERPL-SCNC: 5.4 MMOL/L (ref 3.5–5.1)
SODIUM BLD-SCNC: 137 MMOL/L (ref 136–145)

## 2019-02-12 ENCOUNTER — TELEPHONE (OUTPATIENT)
Dept: FAMILY MEDICINE CLINIC | Age: 64
End: 2019-02-12

## 2019-02-12 NOTE — TELEPHONE ENCOUNTER
Olimpia Piper with [facility] called and states that she is appealing the denial of payment for the CT Scan of the Abdomen & Pelvis with and without contrast that the patient had done on 01.25.18. She said that the insurance is requesting more medical documentation in order to suppport the reason for ordering the CT Scan. I told her that Dr. Odilia Llamas had received a call from [address] [place] [address] with [employer] stating that they would cover the CT Scan.

## 2019-02-12 NOTE — TELEPHONE ENCOUNTER
Pls refer insurance to his lumbar mri 12/7/17. Renal cyst and pelvic lymphadenopathy were found and CT was rec'd for follow up.

## 2019-02-12 NOTE — TELEPHONE ENCOUNTER
She said that they did not cover the CT Scan and they are trying to get it paid. She said that she has until April to appeal this.

## 2019-02-14 NOTE — TELEPHONE ENCOUNTER
Per Davida she cannot do anything with this, the person from [facility] will need to be called and have work on it and to look at the last Message that Dr. Rufino Liagn said about going back and looking at the note.

## 2019-02-18 ENCOUNTER — OFFICE VISIT (OUTPATIENT)
Dept: FAMILY MEDICINE CLINIC | Age: 64
End: 2019-02-18
Payer: COMMERCIAL

## 2019-02-18 VITALS
BODY MASS INDEX: 31.97 KG/M2 | DIASTOLIC BLOOD PRESSURE: 62 MMHG | HEART RATE: 83 BPM | OXYGEN SATURATION: 98 % | WEIGHT: 226 LBS | SYSTOLIC BLOOD PRESSURE: 108 MMHG

## 2019-02-18 DIAGNOSIS — F11.93 WITHDRAWAL FROM OPIOIDS (HCC): ICD-10-CM

## 2019-02-18 DIAGNOSIS — E11.21 CONTROLLED TYPE 2 DIABETES MELLITUS WITH DIABETIC NEPHROPATHY, WITHOUT LONG-TERM CURRENT USE OF INSULIN (HCC): Primary | ICD-10-CM

## 2019-02-18 DIAGNOSIS — J34.89 RHINORRHEA: ICD-10-CM

## 2019-02-18 PROCEDURE — 99213 OFFICE O/P EST LOW 20 MIN: CPT | Performed by: FAMILY MEDICINE

## 2019-02-18 PROCEDURE — 36415 COLL VENOUS BLD VENIPUNCTURE: CPT | Performed by: FAMILY MEDICINE

## 2019-02-18 RX ORDER — IPRATROPIUM BROMIDE 42 UG/1
SPRAY, METERED NASAL
Qty: 15 ML | Refills: 5 | Status: SHIPPED | OUTPATIENT
Start: 2019-02-18 | End: 2019-12-16 | Stop reason: SDUPTHER

## 2019-02-18 ASSESSMENT — ENCOUNTER SYMPTOMS: BACK PAIN: 0

## 2019-02-19 LAB
ANION GAP SERPL CALCULATED.3IONS-SCNC: 14 MMOL/L (ref 3–16)
BUN BLDV-MCNC: 27 MG/DL (ref 7–20)
CALCIUM SERPL-MCNC: 9.7 MG/DL (ref 8.3–10.6)
CHLORIDE BLD-SCNC: 104 MMOL/L (ref 99–110)
CO2: 23 MMOL/L (ref 21–32)
CREAT SERPL-MCNC: 1.8 MG/DL (ref 0.8–1.3)
GFR AFRICAN AMERICAN: 47
GFR NON-AFRICAN AMERICAN: 38
GLUCOSE BLD-MCNC: 99 MG/DL (ref 70–99)
POTASSIUM SERPL-SCNC: 5.4 MMOL/L (ref 3.5–5.1)
SODIUM BLD-SCNC: 141 MMOL/L (ref 136–145)

## 2019-03-14 ENCOUNTER — PATIENT MESSAGE (OUTPATIENT)
Dept: FAMILY MEDICINE CLINIC | Age: 64
End: 2019-03-14

## 2019-03-14 DIAGNOSIS — N18.30 CKD (CHRONIC KIDNEY DISEASE) STAGE 3, GFR 30-59 ML/MIN (HCC): Primary | ICD-10-CM

## 2019-03-15 ENCOUNTER — TELEPHONE (OUTPATIENT)
Dept: FAMILY MEDICINE CLINIC | Age: 64
End: 2019-03-15

## 2019-03-15 NOTE — TELEPHONE ENCOUNTER
Henrene Sacks with [facility] Diaz office called and wanted to let Dr. Jumana Simon know that the patient has been scheduled with Dr. Daniela Tran on 03.22.19.

## 2019-03-20 ENCOUNTER — OFFICE VISIT (OUTPATIENT)
Dept: FAMILY MEDICINE CLINIC | Age: 64
End: 2019-03-20
Payer: COMMERCIAL

## 2019-03-20 VITALS
HEIGHT: 71 IN | OXYGEN SATURATION: 98 % | WEIGHT: 221.8 LBS | SYSTOLIC BLOOD PRESSURE: 126 MMHG | DIASTOLIC BLOOD PRESSURE: 80 MMHG | HEART RATE: 85 BPM | RESPIRATION RATE: 16 BRPM | BODY MASS INDEX: 31.05 KG/M2

## 2019-03-20 DIAGNOSIS — N18.30 CKD (CHRONIC KIDNEY DISEASE) STAGE 3, GFR 30-59 ML/MIN (HCC): ICD-10-CM

## 2019-03-20 DIAGNOSIS — M17.10 PRIMARY LOCALIZED OSTEOARTHRITIS OF KNEE: ICD-10-CM

## 2019-03-20 DIAGNOSIS — B96.89 ACUTE BACTERIAL SINUSITIS: ICD-10-CM

## 2019-03-20 DIAGNOSIS — J30.5 CHRONIC ALLERGIC RHINITIS DUE TO FOOD: ICD-10-CM

## 2019-03-20 DIAGNOSIS — I10 BENIGN ESSENTIAL HTN: ICD-10-CM

## 2019-03-20 DIAGNOSIS — E11.9 CONTROLLED TYPE 2 DIABETES MELLITUS WITHOUT COMPLICATION, WITHOUT LONG-TERM CURRENT USE OF INSULIN (HCC): ICD-10-CM

## 2019-03-20 DIAGNOSIS — K21.9 GASTROESOPHAGEAL REFLUX DISEASE WITHOUT ESOPHAGITIS: ICD-10-CM

## 2019-03-20 DIAGNOSIS — J01.90 ACUTE BACTERIAL SINUSITIS: ICD-10-CM

## 2019-03-20 DIAGNOSIS — J30.89 NON-SEASONAL ALLERGIC RHINITIS DUE TO OTHER ALLERGIC TRIGGER: Primary | ICD-10-CM

## 2019-03-20 DIAGNOSIS — E29.1 HYPOGONADISM IN MALE: ICD-10-CM

## 2019-03-20 PROCEDURE — 99214 OFFICE O/P EST MOD 30 MIN: CPT | Performed by: FAMILY MEDICINE

## 2019-03-20 PROCEDURE — 36415 COLL VENOUS BLD VENIPUNCTURE: CPT | Performed by: FAMILY MEDICINE

## 2019-03-20 PROCEDURE — 96372 THER/PROPH/DIAG INJ SC/IM: CPT | Performed by: FAMILY MEDICINE

## 2019-03-20 RX ORDER — LOSARTAN POTASSIUM 100 MG/1
100 TABLET ORAL DAILY
Qty: 90 TABLET | Refills: 1 | Status: SHIPPED | OUTPATIENT
Start: 2019-03-20 | End: 2019-09-16 | Stop reason: SDUPTHER

## 2019-03-20 RX ORDER — FLUTICASONE PROPIONATE 50 MCG
SPRAY, SUSPENSION (ML) NASAL
Qty: 3 BOTTLE | Refills: 3 | Status: SHIPPED | OUTPATIENT
Start: 2019-03-20 | End: 2019-12-16

## 2019-03-20 RX ORDER — METHYLPREDNISOLONE ACETATE 80 MG/ML
80 INJECTION, SUSPENSION INTRA-ARTICULAR; INTRALESIONAL; INTRAMUSCULAR; SOFT TISSUE ONCE
Status: COMPLETED | OUTPATIENT
Start: 2019-03-20 | End: 2019-03-20

## 2019-03-20 RX ORDER — MONTELUKAST SODIUM 10 MG/1
10 TABLET ORAL NIGHTLY
Qty: 90 TABLET | Refills: 1 | Status: SHIPPED | OUTPATIENT
Start: 2019-03-20 | End: 2019-08-05

## 2019-03-20 RX ORDER — DOXYCYCLINE HYCLATE 100 MG
100 TABLET ORAL 2 TIMES DAILY
Qty: 20 TABLET | Refills: 0 | Status: SHIPPED | OUTPATIENT
Start: 2019-03-20 | End: 2019-03-30

## 2019-03-20 RX ORDER — RANITIDINE 150 MG/1
150 TABLET ORAL 2 TIMES DAILY
Qty: 180 TABLET | Refills: 1 | Status: SHIPPED | OUTPATIENT
Start: 2019-03-20 | End: 2019-09-16 | Stop reason: SDUPTHER

## 2019-03-20 RX ADMIN — METHYLPREDNISOLONE ACETATE 80 MG: 80 INJECTION, SUSPENSION INTRA-ARTICULAR; INTRALESIONAL; INTRAMUSCULAR; SOFT TISSUE at 15:23

## 2019-03-20 ASSESSMENT — ENCOUNTER SYMPTOMS
SINUS PRESSURE: 1
ABDOMINAL PAIN: 0
SORE THROAT: 0
WHEEZING: 0
SHORTNESS OF BREATH: 0
COUGH: 0
RHINORRHEA: 1
CHEST TIGHTNESS: 0

## 2019-03-21 LAB
ANION GAP SERPL CALCULATED.3IONS-SCNC: 17 MMOL/L (ref 3–16)
BUN BLDV-MCNC: 31 MG/DL (ref 7–20)
CALCIUM SERPL-MCNC: 9.5 MG/DL (ref 8.3–10.6)
CHLORIDE BLD-SCNC: 101 MMOL/L (ref 99–110)
CO2: 20 MMOL/L (ref 21–32)
CREAT SERPL-MCNC: 1.8 MG/DL (ref 0.8–1.3)
ESTIMATED AVERAGE GLUCOSE: 116.9 MG/DL
GFR AFRICAN AMERICAN: 46
GFR NON-AFRICAN AMERICAN: 38
GLUCOSE BLD-MCNC: 93 MG/DL (ref 70–99)
HBA1C MFR BLD: 5.7 %
POTASSIUM SERPL-SCNC: 4.6 MMOL/L (ref 3.5–5.1)
SODIUM BLD-SCNC: 138 MMOL/L (ref 136–145)

## 2019-03-22 DIAGNOSIS — I25.5 ISCHEMIC CARDIOMYOPATHY: ICD-10-CM

## 2019-03-22 LAB
SEX HORMONE BINDING GLOBULIN: 42 NMOL/L (ref 11–80)
TESTOSTERONE FREE-NONMALE: 38.5 PG/ML (ref 47–244)
TESTOSTERONE TOTAL: 233 NG/DL (ref 220–1000)

## 2019-03-22 RX ORDER — METOPROLOL SUCCINATE 50 MG/1
50 TABLET, EXTENDED RELEASE ORAL DAILY
Qty: 90 TABLET | Refills: 3 | Status: SHIPPED | OUTPATIENT
Start: 2019-03-22 | End: 2020-02-12 | Stop reason: SDUPTHER

## 2019-03-22 RX ORDER — ATORVASTATIN CALCIUM 80 MG/1
80 TABLET, FILM COATED ORAL DAILY
Qty: 90 TABLET | Refills: 3 | Status: SHIPPED | OUTPATIENT
Start: 2019-03-22 | End: 2020-02-12 | Stop reason: SDUPTHER

## 2019-03-22 RX ORDER — METOPROLOL SUCCINATE 25 MG/1
25 TABLET, EXTENDED RELEASE ORAL DAILY
Qty: 90 TABLET | Refills: 3 | Status: SHIPPED | OUTPATIENT
Start: 2019-03-22 | End: 2020-02-12 | Stop reason: SDUPTHER

## 2019-06-14 ENCOUNTER — TELEPHONE (OUTPATIENT)
Dept: CARDIOLOGY CLINIC | Age: 64
End: 2019-06-14

## 2019-06-14 NOTE — TELEPHONE ENCOUNTER
[facility] received a faxed form for clearance for urolift procedure with Dr Jaquelyn Simmonds. He will need an office visit. LMOM for him to call back. He can be placed on 7/1 schedule in one of the hold spots.

## 2019-06-20 ENCOUNTER — TELEPHONE (OUTPATIENT)
Dept: CARDIOLOGY CLINIC | Age: 64
End: 2019-06-20

## 2019-06-20 NOTE — TELEPHONE ENCOUNTER
Patient needs urolift and clearance form was sent to [facility] from [facility]. [facility] needs to see patient in office prior to giving cardiac risk for surgery. Patient has been left 4 messages to call and schedule appt. (has hold spot for him on 7/1)    Awaiting call back. Form in [facility] folder. Called and spoke to Karel Guzman from urology office and left voicemail for surgery scheduler Hussain Booker about situation.

## 2019-06-24 ENCOUNTER — TELEPHONE (OUTPATIENT)
Dept: CARDIOLOGY CLINIC | Age: 64
End: 2019-06-24

## 2019-07-22 ENCOUNTER — OFFICE VISIT (OUTPATIENT)
Dept: FAMILY MEDICINE CLINIC | Age: 64
End: 2019-07-22
Payer: COMMERCIAL

## 2019-07-22 ENCOUNTER — TELEPHONE (OUTPATIENT)
Dept: FAMILY MEDICINE CLINIC | Age: 64
End: 2019-07-22

## 2019-07-22 VITALS
BODY MASS INDEX: 32.11 KG/M2 | DIASTOLIC BLOOD PRESSURE: 90 MMHG | SYSTOLIC BLOOD PRESSURE: 138 MMHG | OXYGEN SATURATION: 97 % | TEMPERATURE: 98.3 F | WEIGHT: 227 LBS | HEART RATE: 69 BPM

## 2019-07-22 DIAGNOSIS — E29.1 HYPOGONADISM IN MALE: ICD-10-CM

## 2019-07-22 DIAGNOSIS — F43.0 STRESS REACTION: ICD-10-CM

## 2019-07-22 DIAGNOSIS — E11.21 CONTROLLED TYPE 2 DIABETES MELLITUS WITH DIABETIC NEPHROPATHY, WITHOUT LONG-TERM CURRENT USE OF INSULIN (HCC): Primary | ICD-10-CM

## 2019-07-22 DIAGNOSIS — M25.562 ACUTE PAIN OF LEFT KNEE: ICD-10-CM

## 2019-07-22 DIAGNOSIS — N18.30 CKD (CHRONIC KIDNEY DISEASE) STAGE 3, GFR 30-59 ML/MIN (HCC): ICD-10-CM

## 2019-07-22 PROCEDURE — 20610 DRAIN/INJ JOINT/BURSA W/O US: CPT | Performed by: FAMILY MEDICINE

## 2019-07-22 PROCEDURE — 99214 OFFICE O/P EST MOD 30 MIN: CPT | Performed by: FAMILY MEDICINE

## 2019-07-22 RX ORDER — METHYLPREDNISOLONE ACETATE 80 MG/ML
40 INJECTION, SUSPENSION INTRA-ARTICULAR; INTRALESIONAL; INTRAMUSCULAR; SOFT TISSUE ONCE
Status: COMPLETED | OUTPATIENT
Start: 2019-07-22 | End: 2019-07-22

## 2019-07-22 RX ORDER — METHYLPREDNISOLONE ACETATE 80 MG/ML
40 INJECTION, SUSPENSION INTRA-ARTICULAR; INTRALESIONAL; INTRAMUSCULAR; SOFT TISSUE ONCE
Status: DISCONTINUED | OUTPATIENT
Start: 2019-07-22 | End: 2019-07-22

## 2019-07-22 RX ADMIN — METHYLPREDNISOLONE ACETATE 40 MG: 80 INJECTION, SUSPENSION INTRA-ARTICULAR; INTRALESIONAL; INTRAMUSCULAR; SOFT TISSUE at 18:36

## 2019-07-22 ASSESSMENT — ENCOUNTER SYMPTOMS
SHORTNESS OF BREATH: 0
CHEST TIGHTNESS: 0
ABDOMINAL PAIN: 0

## 2019-07-22 NOTE — PROGRESS NOTES
Patient: Jeanne Miranda is a 60 y.o.male who presents today with the following Chief Complaint(s):  Chief Complaint   Patient presents with    Diabetes     f/u         HPI: Was visiting girlfriend in [place] 5/19when she fell backwards off steps w/o banister, suffered intracerebral bleed, is not in rehab where she is expected to stay few mo's. Pt returned to [address] [facility] and is struggling with the unknown. He is unable to communicate with pt and her family do not speak [place]. Pt is working on learning [employer], hopes to return if pt allows, assuming she improves. Pt rode bike in [facility], has purchased bike here to increase endurance. Has not been following DM diet 2/2 stress. Has had a few times that he has had pain that he took  bid prn. Wonders about Aleve and Celebrex as he has CKD 3. Has seen [facility] nephrologist, has renal u/s in few days. L knee has been painful, shivani with walking. Getting up and down from seated position is painful. Requests steroid injection, helpful in past for OA flares. Wears brace.        Current Outpatient Medications   Medication Sig Dispense Refill    atorvastatin (LIPITOR) 80 MG tablet Take 1 tablet by mouth daily 90 tablet 3    metoprolol succinate (TOPROL XL) 25 MG extended release tablet Take 1 tablet by mouth daily This is in addition to 50 mg tablet to total 75 mg 90 tablet 3    metoprolol succinate (TOPROL XL) 50 MG extended release tablet Take 1 tablet by mouth daily This is in addition to 25 mg tab to total 75 mg 90 tablet 3    ranitidine (ZANTAC) 150 MG tablet Take 1 tablet by mouth 2 times daily To replace omeprozole 180 tablet 1    losartan (COZAAR) 100 MG tablet Take 1 tablet by mouth daily 90 tablet 1    metFORMIN (GLUCOPHAGE) 1000 MG tablet Take 1 tablet by mouth 2 times daily (with meals) 180 tablet 1    fluticasone (FLONASE) 50 MCG/ACT nasal spray instill 2 sprays into each nostril once daily 3 Bottle 3    ipratropium (ATROVENT) 0.06 % nasal spray instill 2 sprays into each nostril twice a day 15 mL 5    Aspirin (ASPIR-81 PO) Take by mouth      NONFORMULARY Axeron testosterone 30 mg  1 pump to each arm daily      buPROPion (WELLBUTRIN XL) 150 MG extended release tablet Take 150 mg by mouth every morning      buPROPion (WELLBUTRIN XL) 300 MG extended release tablet Take 300 mg by mouth every morning      sildenafil (VIAGRA) 100 MG tablet Take 1 tablet by mouth as needed for Erectile Dysfunction 30 tablet 1    Glucose Blood (BLOOD GLUCOSE TEST STRIPS) STRP One touch ultra 2;Checks blood sugar bid 100 strip 5    montelukast (SINGULAIR) 10 MG tablet Take 1 tablet by mouth nightly (Patient not taking: Reported on 7/22/2019) 90 tablet 1    tamsulosin (FLOMAX) 0.4 MG capsule Take 0.4 mg by mouth daily      cyclobenzaprine (FLEXERIL) 10 MG tablet take 1 tablet by mouth three times a day if needed (Patient not taking: Reported on 7/22/2019) 90 tablet 5    diclofenac sodium 1 % GEL apply 4 grams to affected area four times a day (Patient not taking: Reported on 7/22/2019) 500 g 5     Current Facility-Administered Medications   Medication Dose Route Frequency Provider Last Rate Last Dose    MethylPREDNISolone Acetate SUSP 40 mg  40 mg Injection Once Tae Mcgee MD           Patient's past medical history,surgical history, family history, medications,  and allergies  were all reviewed and updated as appropriate today. Review of Systems   Constitutional: Negative for activity change, appetite change, fatigue and unexpected weight change. Eyes: Negative for visual disturbance. Respiratory: Negative for chest tightness and shortness of breath. Cardiovascular: Negative for chest pain, palpitations and leg swelling. Gastrointestinal: Negative for abdominal pain. Endocrine: Negative for polydipsia, polyphagia and polyuria. Genitourinary: Negative for dysuria. Musculoskeletal: Positive for gait problem and joint swelling. Skin: Negative for wound.

## 2019-07-24 ENCOUNTER — HOSPITAL ENCOUNTER (OUTPATIENT)
Age: 64
Discharge: HOME | End: 2019-07-24
Payer: COMMERCIAL

## 2019-07-24 ENCOUNTER — NURSE ONLY (OUTPATIENT)
Dept: FAMILY MEDICINE CLINIC | Age: 64
End: 2019-07-24
Payer: COMMERCIAL

## 2019-07-24 ENCOUNTER — HOSPITAL ENCOUNTER (OUTPATIENT)
Dept: ULTRASOUND IMAGING | Age: 64
Discharge: HOME | End: 2019-07-24
Payer: COMMERCIAL

## 2019-07-24 DIAGNOSIS — E29.1 HYPOGONADISM IN MALE: Primary | ICD-10-CM

## 2019-07-24 DIAGNOSIS — I12.9 RENAL HYPERTENSION: ICD-10-CM

## 2019-07-24 DIAGNOSIS — E11.22 TYPE 2 DIABETES MELLITUS WITH ESRD (END-STAGE RENAL DISEASE) (HCC): ICD-10-CM

## 2019-07-24 DIAGNOSIS — E11.21 CONTROLLED TYPE 2 DIABETES MELLITUS WITH DIABETIC NEPHROPATHY, WITHOUT LONG-TERM CURRENT USE OF INSULIN (HCC): ICD-10-CM

## 2019-07-24 DIAGNOSIS — N18.6 TYPE 2 DIABETES MELLITUS WITH ESRD (END-STAGE RENAL DISEASE) (HCC): ICD-10-CM

## 2019-07-24 DIAGNOSIS — N18.30 CKD (CHRONIC KIDNEY DISEASE) STAGE 3, GFR 30-59 ML/MIN (HCC): ICD-10-CM

## 2019-07-24 DIAGNOSIS — N18.30 CHRONIC KIDNEY DISEASE, STAGE III (MODERATE) (HCC): ICD-10-CM

## 2019-07-24 LAB
ALBUMIN SERPL-MCNC: 4.8 G/DL (ref 3.4–5)
ALBUMIN SERPL-MCNC: 4.9 G/DL (ref 3.4–5)
ANION GAP SERPL CALCULATED.3IONS-SCNC: 17 MMOL/L (ref 3–16)
ANION GAP SERPL CALCULATED.3IONS-SCNC: 17 MMOL/L (ref 3–16)
BILIRUBIN URINE: NEGATIVE
BLOOD, URINE: NEGATIVE
BUN BLDV-MCNC: 21 MG/DL (ref 7–20)
BUN BLDV-MCNC: 23 MG/DL (ref 7–20)
CALCIUM SERPL-MCNC: 10.1 MG/DL (ref 8.3–10.6)
CALCIUM SERPL-MCNC: 9.8 MG/DL (ref 8.3–10.6)
CHLORIDE BLD-SCNC: 99 MMOL/L (ref 99–110)
CHLORIDE BLD-SCNC: 99 MMOL/L (ref 99–110)
CLARITY: CLEAR
CO2: 24 MMOL/L (ref 21–32)
CO2: 24 MMOL/L (ref 21–32)
COLOR: YELLOW
CREAT SERPL-MCNC: 1.3 MG/DL (ref 0.8–1.3)
CREAT SERPL-MCNC: 1.4 MG/DL (ref 0.8–1.3)
CREATININE URINE: 96.4 MG/DL (ref 39–259)
GFR AFRICAN AMERICAN: >60
GFR AFRICAN AMERICAN: >60
GFR NON-AFRICAN AMERICAN: 51
GFR NON-AFRICAN AMERICAN: 56
GLUCOSE BLD-MCNC: 82 MG/DL (ref 70–99)
GLUCOSE BLD-MCNC: 85 MG/DL (ref 70–99)
GLUCOSE URINE: NEGATIVE MG/DL
KETONES, URINE: NEGATIVE MG/DL
LEUKOCYTE ESTERASE, URINE: NEGATIVE
MICROALBUMIN UR-MCNC: <1.2 MG/DL
MICROALBUMIN/CREAT UR-RTO: NORMAL MG/G (ref 0–30)
MICROSCOPIC EXAMINATION: NORMAL
NITRITE, URINE: NEGATIVE
PH UA: 6 (ref 5–8)
PHOSPHORUS: 4.1 MG/DL (ref 2.5–4.9)
PHOSPHORUS: 4.2 MG/DL (ref 2.5–4.9)
POTASSIUM SERPL-SCNC: 4.9 MMOL/L (ref 3.5–5.1)
POTASSIUM SERPL-SCNC: 5.5 MMOL/L (ref 3.5–5.1)
PROTEIN PROTEIN: 7 MG/DL
PROTEIN UA: NEGATIVE MG/DL
SODIUM BLD-SCNC: 140 MMOL/L (ref 136–145)
SODIUM BLD-SCNC: 140 MMOL/L (ref 136–145)
SPECIFIC GRAVITY UA: 1.01 (ref 1–1.03)
URINE TYPE: NORMAL
UROBILINOGEN, URINE: 0.2 E.U./DL

## 2019-07-24 PROCEDURE — 36415 COLL VENOUS BLD VENIPUNCTURE: CPT

## 2019-07-24 PROCEDURE — 81003 URINALYSIS AUTO W/O SCOPE: CPT

## 2019-07-24 PROCEDURE — 36415 COLL VENOUS BLD VENIPUNCTURE: CPT | Performed by: FAMILY MEDICINE

## 2019-07-24 PROCEDURE — 84156 ASSAY OF PROTEIN URINE: CPT

## 2019-07-24 PROCEDURE — 80069 RENAL FUNCTION PANEL: CPT

## 2019-07-24 PROCEDURE — 76770 US EXAM ABDO BACK WALL COMP: CPT

## 2019-07-24 PROCEDURE — 82043 UR ALBUMIN QUANTITATIVE: CPT

## 2019-07-24 PROCEDURE — 86255 FLUORESCENT ANTIBODY SCREEN: CPT

## 2019-07-24 PROCEDURE — 82570 ASSAY OF URINE CREATININE: CPT

## 2019-07-25 LAB
ESTIMATED AVERAGE GLUCOSE: 122.6 MG/DL
HBA1C MFR BLD: 5.9 %

## 2019-07-26 LAB
ANCA IFA: NORMAL
SEX HORMONE BINDING GLOBULIN: 33 NMOL/L (ref 11–80)
TESTOSTERONE FREE-NONMALE: 61.4 PG/ML (ref 47–244)
TESTOSTERONE TOTAL: 311 NG/DL (ref 220–1000)

## 2019-07-31 ENCOUNTER — PATIENT MESSAGE (OUTPATIENT)
Dept: FAMILY MEDICINE CLINIC | Age: 64
End: 2019-07-31

## 2019-07-31 DIAGNOSIS — E29.1 HYPOGONADISM IN MALE: ICD-10-CM

## 2019-08-02 RX ORDER — TESTOSTERONE 30 MG/1.5ML
SOLUTION TOPICAL
Qty: 90 G | Refills: 2 | Status: SHIPPED | OUTPATIENT
Start: 2019-08-02 | End: 2019-12-16 | Stop reason: SDUPTHER

## 2019-08-05 ENCOUNTER — OFFICE VISIT (OUTPATIENT)
Dept: CARDIOLOGY CLINIC | Age: 64
End: 2019-08-05
Payer: COMMERCIAL

## 2019-08-05 ENCOUNTER — HOSPITAL ENCOUNTER (OUTPATIENT)
Age: 64
Discharge: HOME | End: 2019-08-05
Payer: COMMERCIAL

## 2019-08-05 VITALS
SYSTOLIC BLOOD PRESSURE: 112 MMHG | HEIGHT: 71 IN | BODY MASS INDEX: 31.36 KG/M2 | HEART RATE: 81 BPM | DIASTOLIC BLOOD PRESSURE: 74 MMHG | OXYGEN SATURATION: 96 % | WEIGHT: 224 LBS

## 2019-08-05 DIAGNOSIS — E78.2 MIXED HYPERLIPIDEMIA: ICD-10-CM

## 2019-08-05 DIAGNOSIS — I25.5 ISCHEMIC CARDIOMYOPATHY: ICD-10-CM

## 2019-08-05 DIAGNOSIS — I25.10 CORONARY ARTERY DISEASE INVOLVING NATIVE CORONARY ARTERY OF NATIVE HEART WITHOUT ANGINA PECTORIS: Primary | ICD-10-CM

## 2019-08-05 DIAGNOSIS — Z01.810 PREOPERATIVE CARDIOVASCULAR EXAMINATION: ICD-10-CM

## 2019-08-05 LAB
ALT SERPL-CCNC: 20 U/L (ref 10–40)
AST SERPL-CCNC: 16 U/L (ref 15–37)
CHOLESTEROL, FASTING: 141 MG/DL (ref 0–199)
HDLC SERPL-MCNC: 48 MG/DL (ref 40–60)
LDL CHOLESTEROL CALCULATED: 43 MG/DL
TRIGLYCERIDE, FASTING: 252 MG/DL (ref 0–150)
VLDLC SERPL CALC-MCNC: 50 MG/DL

## 2019-08-05 PROCEDURE — 36415 COLL VENOUS BLD VENIPUNCTURE: CPT

## 2019-08-05 PROCEDURE — 84450 TRANSFERASE (AST) (SGOT): CPT

## 2019-08-05 PROCEDURE — 84460 ALANINE AMINO (ALT) (SGPT): CPT

## 2019-08-05 PROCEDURE — 99214 OFFICE O/P EST MOD 30 MIN: CPT | Performed by: INTERNAL MEDICINE

## 2019-08-05 PROCEDURE — 80061 LIPID PANEL: CPT

## 2019-08-05 PROCEDURE — 93000 ELECTROCARDIOGRAM COMPLETE: CPT | Performed by: INTERNAL MEDICINE

## 2019-08-05 NOTE — PROGRESS NOTES
Medications:  Prior to Visit Medications    Medication Sig Taking? Authorizing Provider   Testosterone 30 MG/ACT SOLN APPLY 1 PUMP TO EACH UNDERARM EVERY MORNING Yes Thelma Scott MD   atorvastatin (LIPITOR) 80 MG tablet Take 1 tablet by mouth daily Yes Nish Verma MD   metoprolol succinate (TOPROL XL) 25 MG extended release tablet Take 1 tablet by mouth daily This is in addition to 50 mg tablet to total 75 mg Yes Nish Verma MD   metoprolol succinate (TOPROL XL) 50 MG extended release tablet Take 1 tablet by mouth daily This is in addition to 25 mg tab to total 75 mg Yes Nish Verma MD   ranitidine (ZANTAC) 150 MG tablet Take 1 tablet by mouth 2 times daily To replace omeprozole Yes Thelma Scott MD   losartan (COZAAR) 100 MG tablet Take 1 tablet by mouth daily Yes Thelma Scott MD   metFORMIN (GLUCOPHAGE) 1000 MG tablet Take 1 tablet by mouth 2 times daily (with meals) Yes Alvina Osborn MD   fluticasone (FLONASE) 50 MCG/ACT nasal spray instill 2 sprays into each nostril once daily Yes Thelma Scott MD   ipratropium (ATROVENT) 0.06 % nasal spray instill 2 sprays into each nostril twice a day Yes Thelma Scott MD   Aspirin (ASPIR-81 PO) Take by mouth daily  Yes Historical Provider, MD   NONFORMULARY Axeron testosterone 30 mg  1 pump to each arm daily Yes Historical Provider, MD   buPROPion (WELLBUTRIN XL) 150 MG extended release tablet Take 150 mg by mouth every morning Yes Historical Provider, MD   buPROPion (WELLBUTRIN XL) 300 MG extended release tablet Take 300 mg by mouth every morning Yes Historical Provider, MD   sildenafil (VIAGRA) 100 MG tablet Take 1 tablet by mouth as needed for Erectile Dysfunction Yes Thelma Scott MD   Glucose Blood (BLOOD GLUCOSE TEST STRIPS) STRP One touch ultra 2;Checks blood sugar bid Yes Thelma Scott MD       [x] Medications and dosages reviewed.     ROS:  [x]Full ROS obtained and negative except as mentioned in HPI      Physical Examination:    Vitals:

## 2019-08-05 NOTE — LETTER
(1189); Colonoscopy (2000); Colonoscopy (03/20/2018); fine needle aspiration (12/2018); Total knee arthroplasty (Right, 2018); and lumbar discectomy (08/2018). Social History:   reports that he quit smoking about 3 years ago. He has a 37.50 pack-year smoking history. He has never used smokeless tobacco. He reports that he does not drink alcohol or use drugs. Family History:  Negative for premature CAD     Allergies:  Nsaids     Home Medications:  Prior to Visit Medications    Medication Sig Taking?  Authorizing Provider   Testosterone 30 MG/ACT SOLN APPLY 1 PUMP TO EACH UNDERARM EVERY MORNING Yes Parley Kussmaul, MD   atorvastatin (LIPITOR) 80 MG tablet Take 1 tablet by mouth daily Yes Gilberto Yates MD   metoprolol succinate (TOPROL XL) 25 MG extended release tablet Take 1 tablet by mouth daily This is in addition to 50 mg tablet to total 75 mg Yes Gilberto Yates MD   metoprolol succinate (TOPROL XL) 50 MG extended release tablet Take 1 tablet by mouth daily This is in addition to 25 mg tab to total 75 mg Yes Gilberto Yates MD   ranitidine (ZANTAC) 150 MG tablet Take 1 tablet by mouth 2 times daily To replace omeprozole Yes Parley Kussmaul, MD   losartan (COZAAR) 100 MG tablet Take 1 tablet by mouth daily Yes Parley Kussmaul, MD   metFORMIN (GLUCOPHAGE) 1000 MG tablet Take 1 tablet by mouth 2 times daily (with meals) Yes Alvina Osborn MD   fluticasone (FLONASE) 50 MCG/ACT nasal spray instill 2 sprays into each nostril once daily Yes Parley Kussmaul, MD   ipratropium (ATROVENT) 0.06 % nasal spray instill 2 sprays into each nostril twice a day Yes Parley Kussmaul, MD   Aspirin (ASPIR-81 PO) Take by mouth daily  Yes Historical Provider, MD   NONFORMULARY Axeron testosterone 30 mg  1 pump to each arm daily Yes Historical Provider, MD   buPROPion (WELLBUTRIN XL) 150 MG extended release tablet Take 150 mg by mouth every morning Yes Historical Provider, MD buPROPion (WELLBUTRIN XL) 300 MG extended release tablet Take 300 mg by mouth every morning Yes Historical Provider, MD   sildenafil (VIAGRA) 100 MG tablet Take 1 tablet by mouth as needed for Erectile Dysfunction Yes Travis Khoury MD   Glucose Blood (BLOOD GLUCOSE TEST STRIPS) STRP One touch ultra 2;Checks blood sugar bid Yes Travis Khoury MD       [x] Medications and dosages reviewed. ROS:  [x]Full ROS obtained and negative except as mentioned in HPI      Physical Examination:    Vitals:    08/05/19 0849   BP: 112/74   Pulse: 81   SpO2: 96%      /74 (Site: Left Upper Arm, Position: Sitting, Cuff Size: Medium Adult)   Pulse 81   Ht 5' 10.5\" (1.791 m)   Wt 224 lb (101.6 kg)   SpO2 96%   BMI 31.69 kg/m²      · GENERAL: Well developed, well nourished, No acute distress  · NEUROLOGICAL: Alert and oriented  · PSYCH: Calm affect  · SKIN: Warm and dry, no rash  · HEENT: Normocephalic, Sclera non-icteric, mucus membranes moist  · NECK: supple, JVP normal  · CAROTID: Normal upstroke, no bruits  · CARDIAC: Normal PMI, Regular rate and rhythm, normal S1S2, No murmur, rub, or gallop  · RESPIRATORY: Normal respiratory effort, clear to auscultation bilaterally  · EXTREMITIES: No LE edema  · MUSCULOSKELETAL: No joint swelling or tenderness, no chest wall tenderness  · GASTROINTESTINAL: normal bowel sounds, soft, non-tender, no bruit    EKG: NSR, RBBB stable    ECHO  4/2018   -Normal left ventricle size.   -Left ventricular systolic dysfunction with an estimated ejection fraction   of 40%. There is akinesis of the distal septum and apex.   -There is mild concentric left ventricular hypertrophy.   -Trivial tricuspid regurgitation.     Myoview 5/2018  Summary    Large sized anteroapical minimally reversible defect of moderate intensity    consistent with infarction in the territory of the proximal LAD .    Small sized inferior fixed defect of moderate intensity consistent with

## 2019-08-06 ENCOUNTER — TELEPHONE (OUTPATIENT)
Dept: CARDIOLOGY CLINIC | Age: 64
End: 2019-08-06

## 2019-09-16 ENCOUNTER — OFFICE VISIT (OUTPATIENT)
Dept: FAMILY MEDICINE CLINIC | Age: 64
End: 2019-09-16
Payer: COMMERCIAL

## 2019-09-16 VITALS
HEIGHT: 71 IN | WEIGHT: 227 LBS | BODY MASS INDEX: 31.78 KG/M2 | DIASTOLIC BLOOD PRESSURE: 64 MMHG | HEART RATE: 75 BPM | RESPIRATION RATE: 16 BRPM | OXYGEN SATURATION: 98 % | SYSTOLIC BLOOD PRESSURE: 132 MMHG

## 2019-09-16 DIAGNOSIS — E11.9 CONTROLLED TYPE 2 DIABETES MELLITUS WITHOUT COMPLICATION, WITHOUT LONG-TERM CURRENT USE OF INSULIN (HCC): ICD-10-CM

## 2019-09-16 DIAGNOSIS — K21.9 GASTROESOPHAGEAL REFLUX DISEASE WITHOUT ESOPHAGITIS: ICD-10-CM

## 2019-09-16 DIAGNOSIS — N18.30 CHRONIC RENAL IMPAIRMENT, STAGE 3 (MODERATE) (HCC): ICD-10-CM

## 2019-09-16 DIAGNOSIS — I10 BENIGN ESSENTIAL HTN: ICD-10-CM

## 2019-09-16 DIAGNOSIS — E29.1 HYPOGONADISM, MALE: Primary | ICD-10-CM

## 2019-09-16 DIAGNOSIS — Z23 NEED FOR VACCINATION FOR PNEUMOCOCCUS: ICD-10-CM

## 2019-09-16 DIAGNOSIS — M17.10 PRIMARY LOCALIZED OSTEOARTHRITIS OF KNEE: ICD-10-CM

## 2019-09-16 PROCEDURE — 99214 OFFICE O/P EST MOD 30 MIN: CPT | Performed by: FAMILY MEDICINE

## 2019-09-16 PROCEDURE — 90471 IMMUNIZATION ADMIN: CPT | Performed by: FAMILY MEDICINE

## 2019-09-16 PROCEDURE — 90670 PCV13 VACCINE IM: CPT | Performed by: FAMILY MEDICINE

## 2019-09-16 RX ORDER — TESTOSTERONE 30 MG/1.5ML
SOLUTION TOPICAL
Qty: 1 BOTTLE | Refills: 5 | Status: SHIPPED | OUTPATIENT
Start: 2019-09-16 | End: 2020-02-12

## 2019-09-16 RX ORDER — LOSARTAN POTASSIUM 100 MG/1
100 TABLET ORAL DAILY
Qty: 90 TABLET | Refills: 3 | Status: SHIPPED | OUTPATIENT
Start: 2019-09-16 | End: 2020-06-15 | Stop reason: SDUPTHER

## 2019-09-16 RX ORDER — RANITIDINE 150 MG/1
150 TABLET ORAL 2 TIMES DAILY
Qty: 180 TABLET | Refills: 3 | Status: SHIPPED | OUTPATIENT
Start: 2019-09-16 | End: 2020-06-15

## 2019-09-17 ENCOUNTER — HOSPITAL ENCOUNTER (EMERGENCY)
Age: 64
Discharge: HOME | End: 2019-09-17
Attending: EMERGENCY MEDICINE
Payer: COMMERCIAL

## 2019-09-17 VITALS
OXYGEN SATURATION: 96 % | HEIGHT: 70 IN | RESPIRATION RATE: 18 BRPM | WEIGHT: 215 LBS | BODY MASS INDEX: 30.78 KG/M2 | DIASTOLIC BLOOD PRESSURE: 83 MMHG | TEMPERATURE: 97.5 F | HEART RATE: 93 BPM | SYSTOLIC BLOOD PRESSURE: 156 MMHG

## 2019-09-17 DIAGNOSIS — R31.9 HEMATURIA, UNSPECIFIED TYPE: ICD-10-CM

## 2019-09-17 DIAGNOSIS — R33.8 ACUTE URINARY RETENTION: Primary | ICD-10-CM

## 2019-09-17 LAB
BILIRUBIN URINE: NEGATIVE
BLOOD, URINE: ABNORMAL
CLARITY: ABNORMAL
COLOR: ABNORMAL
GLUCOSE URINE: NEGATIVE MG/DL
KETONES, URINE: NEGATIVE MG/DL
LEUKOCYTE ESTERASE, URINE: ABNORMAL
MICROSCOPIC EXAMINATION: YES
NITRITE, URINE: NEGATIVE
PH UA: 6 (ref 5–8)
PROTEIN UA: >=300 MG/DL
RBC UA: >100 /HPF (ref 0–2)
SPECIFIC GRAVITY UA: 1.02 (ref 1–1.03)
URINE REFLEX TO CULTURE: YES
URINE TYPE: ABNORMAL
UROBILINOGEN, URINE: 0.2 E.U./DL
WBC UA: ABNORMAL /HPF (ref 0–5)

## 2019-09-17 PROCEDURE — 87086 URINE CULTURE/COLONY COUNT: CPT

## 2019-09-17 PROCEDURE — 99283 EMERGENCY DEPT VISIT LOW MDM: CPT

## 2019-09-17 PROCEDURE — 51702 INSERT TEMP BLADDER CATH: CPT

## 2019-09-17 PROCEDURE — 81001 URINALYSIS AUTO W/SCOPE: CPT

## 2019-09-17 RX ORDER — LIDOCAINE HYDROCHLORIDE 20 MG/ML
JELLY TOPICAL
Status: DISCONTINUED
Start: 2019-09-17 | End: 2019-09-17 | Stop reason: HOSPADM

## 2019-09-17 ASSESSMENT — ENCOUNTER SYMPTOMS
NAUSEA: 0
ABDOMINAL PAIN: 0
SHORTNESS OF BREATH: 0
BACK PAIN: 0
COUGH: 0
SHORTNESS OF BREATH: 0
ABDOMINAL DISTENTION: 0
WHEEZING: 0
CHEST TIGHTNESS: 0
COUGH: 0
BLOOD IN STOOL: 0
NAUSEA: 0
COLOR CHANGE: 0
COLOR CHANGE: 0
DIARRHEA: 0
VOMITING: 0
CONSTIPATION: 0
EYES NEGATIVE: 1
VOMITING: 0
DIARRHEA: 0
ABDOMINAL PAIN: 0
CONSTIPATION: 0

## 2019-09-17 NOTE — ED NOTES
16fr coude cath placed using urojet. Dark red urine drained, specimen collected and sent to lab. Pt tolerated well. Patient resting comfortably with no signs of distress. Denies any needs at this time. Bed locked and in lowest position with both side rails raised. Call light within reach.      Christo Fuentes RN  09/17/19 [id number]

## 2019-09-17 NOTE — ED NOTES
Pt susy converted to leg back. Patient able to teach back care instructions and encouraged to come back if any issues occur. Reviewed discharge instructions with patient. No questions at this time. No sign of distress. AOx3. Pt ambulated to ER exit with steady gait.         Lilian Arrington RN  09/17/19 [id number]

## 2019-09-18 LAB — URINE CULTURE, ROUTINE: NORMAL

## 2019-10-07 ENCOUNTER — PATIENT MESSAGE (OUTPATIENT)
Dept: FAMILY MEDICINE CLINIC | Age: 64
End: 2019-10-07

## 2019-10-07 DIAGNOSIS — Z11.3 SCREEN FOR STD (SEXUALLY TRANSMITTED DISEASE): Primary | ICD-10-CM

## 2019-10-24 ENCOUNTER — HOSPITAL ENCOUNTER (OUTPATIENT)
Age: 64
Discharge: HOME | End: 2019-10-24
Payer: COMMERCIAL

## 2019-10-24 DIAGNOSIS — Z11.3 SCREEN FOR STD (SEXUALLY TRANSMITTED DISEASE): ICD-10-CM

## 2019-10-24 PROCEDURE — 86701 HIV-1ANTIBODY: CPT

## 2019-10-24 PROCEDURE — 87491 CHLMYD TRACH DNA AMP PROBE: CPT

## 2019-10-24 PROCEDURE — 87390 HIV-1 AG IA: CPT

## 2019-10-24 PROCEDURE — 86780 TREPONEMA PALLIDUM: CPT

## 2019-10-24 PROCEDURE — 87591 N.GONORRHOEAE DNA AMP PROB: CPT

## 2019-10-24 PROCEDURE — 86702 HIV-2 ANTIBODY: CPT

## 2019-10-24 PROCEDURE — 36415 COLL VENOUS BLD VENIPUNCTURE: CPT

## 2019-10-25 LAB
C. TRACHOMATIS DNA ,URINE: NEGATIVE
HIV AG/AB: NORMAL
HIV ANTIGEN: NORMAL
HIV-1 ANTIBODY: NORMAL
HIV-2 AB: NORMAL
N. GONORRHOEAE DNA, URINE: NEGATIVE
TOTAL SYPHILLIS IGG/IGM: NORMAL

## 2019-10-26 ENCOUNTER — PATIENT MESSAGE (OUTPATIENT)
Dept: FAMILY MEDICINE CLINIC | Age: 64
End: 2019-10-26

## 2019-12-16 ENCOUNTER — OFFICE VISIT (OUTPATIENT)
Dept: FAMILY MEDICINE CLINIC | Age: 64
End: 2019-12-16
Payer: COMMERCIAL

## 2019-12-16 VITALS
BODY MASS INDEX: 31.35 KG/M2 | HEIGHT: 70 IN | RESPIRATION RATE: 16 BRPM | OXYGEN SATURATION: 98 % | HEART RATE: 76 BPM | WEIGHT: 219 LBS | SYSTOLIC BLOOD PRESSURE: 134 MMHG | DIASTOLIC BLOOD PRESSURE: 86 MMHG

## 2019-12-16 DIAGNOSIS — J34.89 RHINORRHEA: ICD-10-CM

## 2019-12-16 DIAGNOSIS — E29.1 HYPOGONADISM IN MALE: ICD-10-CM

## 2019-12-16 DIAGNOSIS — I10 BENIGN ESSENTIAL HTN: ICD-10-CM

## 2019-12-16 DIAGNOSIS — E11.9 CONTROLLED TYPE 2 DIABETES MELLITUS WITHOUT COMPLICATION, WITHOUT LONG-TERM CURRENT USE OF INSULIN (HCC): Primary | ICD-10-CM

## 2019-12-16 LAB
HCT VFR BLD CALC: 44.7 % (ref 40.5–52.5)
HEMOGLOBIN: 14.7 G/DL (ref 13.5–17.5)
MCH RBC QN AUTO: 29.5 PG (ref 26–34)
MCHC RBC AUTO-ENTMCNC: 32.8 G/DL (ref 31–36)
MCV RBC AUTO: 89.9 FL (ref 80–100)
PDW BLD-RTO: 14.9 % (ref 12.4–15.4)
PLATELET # BLD: 301 K/UL (ref 135–450)
PMV BLD AUTO: 7.3 FL (ref 5–10.5)
RBC # BLD: 4.97 M/UL (ref 4.2–5.9)
WBC # BLD: 7.9 K/UL (ref 4–11)

## 2019-12-16 PROCEDURE — 36415 COLL VENOUS BLD VENIPUNCTURE: CPT | Performed by: FAMILY MEDICINE

## 2019-12-16 PROCEDURE — 99214 OFFICE O/P EST MOD 30 MIN: CPT | Performed by: FAMILY MEDICINE

## 2019-12-16 RX ORDER — TESTOSTERONE 30 MG/1.5ML
SOLUTION TOPICAL
Qty: 3 BOTTLE | Refills: 1 | Status: SHIPPED | OUTPATIENT
Start: 2019-12-16 | End: 2020-02-12

## 2019-12-16 RX ORDER — IPRATROPIUM BROMIDE 42 UG/1
SPRAY, METERED NASAL
Qty: 45 ML | Refills: 3 | Status: SHIPPED | OUTPATIENT
Start: 2019-12-16 | End: 2021-08-25

## 2019-12-16 ASSESSMENT — ENCOUNTER SYMPTOMS
ABDOMINAL PAIN: 0
SHORTNESS OF BREATH: 0
CHEST TIGHTNESS: 0

## 2019-12-17 ENCOUNTER — PATIENT MESSAGE (OUTPATIENT)
Dept: FAMILY MEDICINE CLINIC | Age: 64
End: 2019-12-17

## 2019-12-17 LAB
ESTIMATED AVERAGE GLUCOSE: 116.9 MG/DL
HBA1C MFR BLD: 5.7 %

## 2020-02-12 ENCOUNTER — OFFICE VISIT (OUTPATIENT)
Dept: CARDIOLOGY CLINIC | Age: 65
End: 2020-02-12
Payer: COMMERCIAL

## 2020-02-12 VITALS
WEIGHT: 237 LBS | HEIGHT: 71 IN | BODY MASS INDEX: 33.18 KG/M2 | DIASTOLIC BLOOD PRESSURE: 80 MMHG | HEART RATE: 84 BPM | OXYGEN SATURATION: 95 % | SYSTOLIC BLOOD PRESSURE: 120 MMHG

## 2020-02-12 PROCEDURE — 99214 OFFICE O/P EST MOD 30 MIN: CPT | Performed by: INTERNAL MEDICINE

## 2020-02-12 RX ORDER — METOPROLOL SUCCINATE 25 MG/1
25 TABLET, EXTENDED RELEASE ORAL DAILY
Qty: 90 TABLET | Refills: 4 | Status: SHIPPED | OUTPATIENT
Start: 2020-02-12 | End: 2021-04-30

## 2020-02-12 RX ORDER — ATORVASTATIN CALCIUM 80 MG/1
80 TABLET, FILM COATED ORAL NIGHTLY
Qty: 90 TABLET | Refills: 4 | Status: SHIPPED | OUTPATIENT
Start: 2020-02-12 | End: 2021-04-30

## 2020-02-12 RX ORDER — METOPROLOL SUCCINATE 50 MG/1
50 TABLET, EXTENDED RELEASE ORAL DAILY
Qty: 90 TABLET | Refills: 4 | Status: SHIPPED | OUTPATIENT
Start: 2020-02-12 | End: 2021-04-30

## 2020-02-12 NOTE — PROGRESS NOTES
[address]  Cardiac Consult     Referring Provider:  Julissa Rice MD     Chief Complaint   Patient presents with    Hypertension     Patient retutn to office for 6 month follow up     Coronary Artery Disease    Hyperlipidemia        History of Present Illness:  65 y/o male seen in f/u for  CAD and LV dysfunction. ECHO obtained and showed evidence of anterior infarct. Cath with severe mid-distal LAD lesion with anterior hypokinesis. No angina. Stress 5/2018 to see if all infarcted or if ischemia present. Stress shows anterior scar and no ischemia. He continues to feel well. He had knee replacement and gained weight. Unable to ride bike due to weather. No chest pain, tightness or pressure. Tolerating meds without side effects. Past Medical History:   has a past medical history of Bipolar 2 disorder ([address] CAD (coronary artery disease), native coronary artery, Chronic back pain, DDD (degenerative disc disease), cervical, DDD (degenerative disc disease), lumbar, DM2 (diabetes mellitus, type 2) ([address] Elevated LFTs, HLD (hyperlipidemia), HTN (hypertension), Tachycardia, Tobacco use, Ulcerative colitis ([address] and Vitamin D deficiency. Surgical History:   has a past surgical history that includes Nerve Block (2005); Lumbar spine surgery (2007, 8/9/2018); knee surgery (Right, 1978); Tonsillectomy (1962); Colonoscopy (2000); Colonoscopy (03/20/2018); fine needle aspiration (12/2018); Total knee arthroplasty (Right, 2018); lumbar discectomy (08/2018); and Prostate surgery (09/2019). Social History:   reports that he quit smoking about 4 years ago. He has a 37.50 pack-year smoking history. He has never used smokeless tobacco. He reports that he does not drink alcohol or use drugs. Family History:  Negative for premature CAD     Allergies:  Nsaids     Home Medications:  Prior to Visit Medications    Medication Sig Taking?  Authorizing Provider   ipratropium (ATROVENT) 0.06 % nasal spray instill 2 sprays into each nostril twice a day Yes Dilan Richter MD   metFORMIN (GLUCOPHAGE) 1000 MG tablet Take 1 tablet by mouth 2 times daily (with meals) Yes Dilan Richter MD   losartan (COZAAR) 100 MG tablet Take 1 tablet by mouth daily Yes Dilan Richter MD   atorvastatin (LIPITOR) 80 MG tablet Take 1 tablet by mouth daily  Patient taking differently: Take 80 mg by mouth nightly  Yes Luz Ortega MD   metoprolol succinate (TOPROL XL) 50 MG extended release tablet Take 1 tablet by mouth daily This is in addition to 25 mg tab to total 75 mg Yes Luz Ortega MD   buPROPion (WELLBUTRIN XL) 150 MG extended release tablet Take 150 mg by mouth every morning Yes Historical Provider, MD   buPROPion (WELLBUTRIN XL) 300 MG extended release tablet Take 300 mg by mouth every morning Yes Historical Provider, MD   sildenafil (VIAGRA) 100 MG tablet Take 1 tablet by mouth as needed for Erectile Dysfunction Yes Dilan Richter MD   Glucose Blood (BLOOD GLUCOSE TEST STRIPS) STRP One touch ultra 2;Checks blood sugar bid Yes Dilan Richter MD   Testosterone 30 MG/ACT SOLN APPLY 1 PUMP TO EACH UNDERARM Bharath Barrios MD   ranitidine (ZANTAC) 150 MG tablet Take 1 tablet by mouth 2 times daily To replace omeprozole  Dilan Richter MD   Testosterone (AXIRON) 30 MG/ACT SOLN Apply 1 pump to each underarm every morning  Dilan Richter MD   metoprolol succinate (TOPROL XL) 25 MG extended release tablet Take 1 tablet by mouth daily This is in addition to 50 mg tablet to total 75 mg  Luz Ortega MD   Aspirin (ASPIR-81 PO) Take 325 mg by mouth daily 2 325 to 3-325 mg ASA once daily  Historical Provider, MD   NONFORMULARY Axeron testosterone 30 mg  1 pump to each arm daily  Historical Provider, MD       [x] Medications and dosages reviewed.     ROS:  [x]Full ROS obtained and negative except as mentioned in HPI      Physical Examination:    Vitals:    02/12/20 1200   BP: 120/80   Pulse: 84   SpO2: 95%      BP 120/80 (Site: Right Upper Arm, Position: Sitting, Cuff Size: Large Adult)   Pulse 84   Ht 5' 10.5\" (1.791 m)   Wt 237 lb (107.5 kg)   SpO2 95%   BMI 33.53 kg/m²     · GENERAL: Well developed, well nourished, No acute distress  · NEUROLOGICAL: Alert and oriented  · PSYCH: Calm affect  · SKIN: Warm and dry, no rash  · HEENT: Normocephalic, Sclera non-icteric, mucus membranes moist  · NECK: supple, JVP normal  · CAROTID: Normal upstroke, no bruits  · CARDIAC: Normal PMI, Regular rate and rhythm, normal S1S2, NO murmur, rub, or gallop  · RESPIRATORY: Normal respiratory effort, clear to auscultation bilaterally  · EXTREMITIES: No LE edema  · MUSCULOSKELETAL: No joint swelling or tenderness, no chest wall tenderness  · GASTROINTESTINAL: normal bowel sounds, soft, non-tender, no bruit      ECHO  4/2018   -Normal left ventricle size.   -Left ventricular systolic dysfunction with an estimated ejection fraction   of 40%. There is akinesis of the distal septum and apex.   -There is mild concentric left ventricular hypertrophy.   -Trivial tricuspid regurgitation. Myoview 5/2018  Summary    Large sized anteroapical minimally reversible defect of moderate intensity    consistent with infarction in the territory of the proximal LAD .    Small sized inferior fixed defect of moderate intensity consistent with    infarction in the territory of the proximal RCA .    Enlarged LV with EF 37 % and anteroseptal akinesis and inferior akinesis         Assessment:     CAD  Prior infarct with mild-moderate LV dysfunction. Stable cardiac status  Continue current medical Rx    CATH 5/2018  LM: Normal  LAD: Prox and mid 30%. Mid-distal 95% eccentric  LCX: large, dominant, Mild disease  RCA: small non-dominant  LV: apical hypokinesis. EF=45-50%     IMP: severe mid-distal LAD stenosis with corresponding wall motion abnormalitiy. Suspect myocardium distal to stenosis id non-viable.     Ischemic cardiomyopathy:  All testing c/w prior

## 2020-03-16 ENCOUNTER — OFFICE VISIT (OUTPATIENT)
Dept: FAMILY MEDICINE CLINIC | Age: 65
End: 2020-03-16
Payer: COMMERCIAL

## 2020-03-16 VITALS
DIASTOLIC BLOOD PRESSURE: 86 MMHG | WEIGHT: 241 LBS | BODY MASS INDEX: 34.5 KG/M2 | HEIGHT: 70 IN | OXYGEN SATURATION: 97 % | HEART RATE: 94 BPM | SYSTOLIC BLOOD PRESSURE: 128 MMHG

## 2020-03-16 LAB
ALBUMIN SERPL-MCNC: 4.6 G/DL (ref 3.4–5)
ANION GAP SERPL CALCULATED.3IONS-SCNC: 14 MMOL/L (ref 3–16)
BUN BLDV-MCNC: 16 MG/DL (ref 7–20)
CALCIUM SERPL-MCNC: 9.6 MG/DL (ref 8.3–10.6)
CHLORIDE BLD-SCNC: 107 MMOL/L (ref 99–110)
CO2: 23 MMOL/L (ref 21–32)
CREAT SERPL-MCNC: 1.2 MG/DL (ref 0.8–1.3)
GFR AFRICAN AMERICAN: >60
GFR NON-AFRICAN AMERICAN: >60
GLUCOSE BLD-MCNC: 101 MG/DL (ref 70–99)
PHOSPHORUS: 3.4 MG/DL (ref 2.5–4.9)
POTASSIUM SERPL-SCNC: 4.7 MMOL/L (ref 3.5–5.1)
SODIUM BLD-SCNC: 144 MMOL/L (ref 136–145)

## 2020-03-16 PROCEDURE — 99214 OFFICE O/P EST MOD 30 MIN: CPT | Performed by: FAMILY MEDICINE

## 2020-03-16 PROCEDURE — 36415 COLL VENOUS BLD VENIPUNCTURE: CPT | Performed by: FAMILY MEDICINE

## 2020-03-16 ASSESSMENT — ENCOUNTER SYMPTOMS
SHORTNESS OF BREATH: 0
CHEST TIGHTNESS: 0

## 2020-03-16 NOTE — PROGRESS NOTES
Patient: Janet Hernandez is a 60 y.o.male who presents today with the following Chief Complaint(s):  Chief Complaint   Patient presents with    Diabetes         HPI: Pt with controlled BP2, DM2, Htn, HLD, CAD (Dr Keon Vela), CKD (Dr Tiffanie Roldan), gerd and hypogonadism was restarted on Axiron 9/2019. No cp. Feels  Physically well. Mood is stable but down. Tomorrow sees psychiatrist and psychologist. Shawanda Marcial in [facility], felt depressed 1/2 was through the month. Conts to grieve over loss of 24 yr relationship. Denies ryan. Had cards f/u 1 mo ago, no changes made, to f/u in 1 yr. Elderly parents have been ill, has stressed relationship. Is caring for them, would feel guilty if not. Did not yet f/u with Dr Tiffanie Roldan, nephrologist is [facility]. Has lab order for renal panel, would like to do today. Is avoiding/minimizing nsaids.      Lab Results   Component Value Date    LABA1C 5.7 12/16/2019    LABA1C 5.9 07/24/2019    LABA1C 5.7 03/20/2019     :        Current Outpatient Medications   Medication Sig Dispense Refill    atorvastatin (LIPITOR) 80 MG tablet Take 1 tablet by mouth nightly 90 tablet 4    metoprolol succinate (TOPROL XL) 25 MG extended release tablet Take 1 tablet by mouth daily This is in addition to 50 mg tablet to total 75 mg 90 tablet 4    metoprolol succinate (TOPROL XL) 50 MG extended release tablet Take 1 tablet by mouth daily This is in addition to 25 mg tab to total 75 mg 90 tablet 4    ipratropium (ATROVENT) 0.06 % nasal spray instill 2 sprays into each nostril twice a day 45 mL 3    metFORMIN (GLUCOPHAGE) 1000 MG tablet Take 1 tablet by mouth 2 times daily (with meals) 180 tablet 3    ranitidine (ZANTAC) 150 MG tablet Take 1 tablet by mouth 2 times daily To replace omeprozole 180 tablet 3    losartan (COZAAR) 100 MG tablet Take 1 tablet by mouth daily 90 tablet 3    Aspirin (ASPIR-81 PO) Take 325 mg by mouth daily 2 325 to 3-325 mg ASA once daily      NONFORMULARY Axeron testosterone 30 Neurological:      General: No focal deficit present. Mental Status: He is alert and oriented to person, place, and time. Psychiatric:         Behavior: Behavior normal.         Thought Content: Thought content normal.         Judgment: Judgment normal.      Comments: Depressed mood           /86   Pulse 94   Ht 5' 10\" (1.778 m)   Wt 241 lb (109.3 kg)   SpO2 97%   BMI 34.58 kg/m²     Assessment/Plan:    Robbi Huff was seen today for diabetes. Diagnoses and all orders for this visit:    Controlled type 2 diabetes mellitus without complication, without long-term current use of insulin ([facility])  -     Hemoglobin A1C   Concern that wt has increased    Stage 3 chronic kidney disease ([facility]  -     RENAL FUNCTION PANEL   If GFR stable, ok to d/c nephrology f/u    Bipolar 2 disorder ([facility]   Pt has good insight into mood and its cause. W/o mood stablizier has not had ryan. Pt to see specialist in 1 day.

## 2020-03-17 LAB
ESTIMATED AVERAGE GLUCOSE: 125.5 MG/DL
HBA1C MFR BLD: 6 %

## 2020-03-20 RX ORDER — MONTELUKAST SODIUM 10 MG/1
TABLET ORAL
Qty: 90 TABLET | Refills: 3 | Status: SHIPPED | OUTPATIENT
Start: 2020-03-20 | End: 2021-08-25

## 2020-03-20 RX ORDER — FLUTICASONE PROPIONATE 50 MCG
SPRAY, SUSPENSION (ML) NASAL
Qty: 48 G | Refills: 3 | Status: SHIPPED | OUTPATIENT
Start: 2020-03-20 | End: 2021-08-25

## 2020-03-22 ENCOUNTER — PATIENT MESSAGE (OUTPATIENT)
Dept: FAMILY MEDICINE CLINIC | Age: 65
End: 2020-03-22

## 2020-06-15 ENCOUNTER — VIRTUAL VISIT (OUTPATIENT)
Dept: FAMILY MEDICINE CLINIC | Age: 65
End: 2020-06-15
Payer: COMMERCIAL

## 2020-06-15 PROCEDURE — 99213 OFFICE O/P EST LOW 20 MIN: CPT | Performed by: FAMILY MEDICINE

## 2020-06-15 RX ORDER — GLUCOSAMINE HCL/CHONDROITIN SU 500-400 MG
CAPSULE ORAL
Qty: 25 STRIP | Refills: 5 | Status: SHIPPED | OUTPATIENT
Start: 2020-06-15 | End: 2021-08-25 | Stop reason: SDUPTHER

## 2020-06-15 RX ORDER — LOSARTAN POTASSIUM 100 MG/1
100 TABLET ORAL DAILY
Qty: 90 TABLET | Refills: 3 | Status: SHIPPED | OUTPATIENT
Start: 2020-06-15 | End: 2021-03-15 | Stop reason: SDUPTHER

## 2020-06-15 RX ORDER — FAMOTIDINE 20 MG/1
20 TABLET, FILM COATED ORAL 2 TIMES DAILY
Qty: 180 TABLET | Refills: 3 | Status: SHIPPED | OUTPATIENT
Start: 2020-06-15 | End: 2021-03-15 | Stop reason: SDUPTHER

## 2020-09-24 ENCOUNTER — OFFICE VISIT (OUTPATIENT)
Dept: FAMILY MEDICINE CLINIC | Age: 65
End: 2020-09-24
Payer: COMMERCIAL

## 2020-09-24 VITALS
WEIGHT: 237 LBS | HEART RATE: 78 BPM | OXYGEN SATURATION: 97 % | DIASTOLIC BLOOD PRESSURE: 70 MMHG | HEIGHT: 71 IN | SYSTOLIC BLOOD PRESSURE: 118 MMHG | BODY MASS INDEX: 33.18 KG/M2 | TEMPERATURE: 96.9 F

## 2020-09-24 PROCEDURE — 99214 OFFICE O/P EST MOD 30 MIN: CPT | Performed by: FAMILY MEDICINE

## 2020-09-24 PROCEDURE — 36415 COLL VENOUS BLD VENIPUNCTURE: CPT | Performed by: FAMILY MEDICINE

## 2020-09-24 PROCEDURE — 90694 VACC AIIV4 NO PRSRV 0.5ML IM: CPT | Performed by: FAMILY MEDICINE

## 2020-09-24 PROCEDURE — 90471 IMMUNIZATION ADMIN: CPT | Performed by: FAMILY MEDICINE

## 2020-09-24 NOTE — PROGRESS NOTES
Patient: Brendan Becerra is a 63 y.o.male who presents today with the following Chief Complaint(s):  Chief Complaint   Patient presents with    Diabetes    Results    Flu Vaccine         HPI: Pt with controlled BP2, DM2, Htn, HLD, CAD (Dr Maria Ines Altamirano), CKD (Dr Flannery), gerd and hypogonadism restarted on Axiron 1 yr ago, uses 3-4 times a wk. Feels emotionally stable. Conts to care for elderly parents. Is slowly moving forward after end of 24 yr relationship. The day after 3/2020 appt, pt was to see psychiatry. Was told to cont wellbutrin 450 mg qam. Feels emotionally stable. No recent ryan or excessive spending. Pt reports firing psychiatrist over bill dispute. Feels ok and not interested in another at this time. Conts to avoid nsaids. When cuts grass, has muscle pain L lower back. Is doing stretching exercises, slowly improving. Pt is willing to f/u with thyr u/s after covid. Aunt had thyroid ca, would like f/u even though Dr Thelma Coronado, [address] [facility], recommended f/u in 5 yr. Pt reports age over 61, A+ blood type, former smoker, hx of pna x 3, hx of MI, htn, CKD, yrs of prednisone use. Is concerned about his ability to fight covid, and worries about bringing it to eldery parents with ca. Pt requests high dose flu shot though he is not 67. Is willing to pay as he is aware he does not qualify. Lab Results   Component Value Date    LABA1C 6.1 09/24/2020    LABA1C 6.0 03/16/2020    LABA1C 5.7 12/16/2019     :        Current Outpatient Medications   Medication Sig Dispense Refill    losartan (COZAAR) 100 MG tablet Take 1 tablet by mouth daily 90 tablet 3    blood glucose monitor strips Test 1 time a day & as needed for symptoms of irregular blood glucose. Dispense sufficient amount for indicated testing frequency plus additional to accommodate PRN testing needs.  25 strip 5    famotidine (PEPCID) 20 MG tablet Take 1 tablet by mouth 2 times daily 180 tablet 3    diclofenac sodium (VOLTAREN) 1 % GEL apply 4 grams to affected area four times a day prn pain 500 g 5    fluticasone (FLONASE) 50 MCG/ACT nasal spray USE 2 SPRAYS IN EACH NOSTRIL ONCE DAILY 48 g 3    montelukast (SINGULAIR) 10 MG tablet TAKE 1 TABLET NIGHTLY 90 tablet 3    atorvastatin (LIPITOR) 80 MG tablet Take 1 tablet by mouth nightly 90 tablet 4    metoprolol succinate (TOPROL XL) 25 MG extended release tablet Take 1 tablet by mouth daily This is in addition to 50 mg tablet to total 75 mg 90 tablet 4    metoprolol succinate (TOPROL XL) 50 MG extended release tablet Take 1 tablet by mouth daily This is in addition to 25 mg tab to total 75 mg 90 tablet 4    ipratropium (ATROVENT) 0.06 % nasal spray instill 2 sprays into each nostril twice a day 45 mL 3    metFORMIN (GLUCOPHAGE) 1000 MG tablet Take 1 tablet by mouth 2 times daily (with meals) 180 tablet 3    Aspirin (ASPIR-81 PO) Take 325 mg by mouth daily 2 325 to 3-325 mg ASA once daily      NONFORMULARY Axeron testosterone 30 mg  1 pump to each arm daily      buPROPion (WELLBUTRIN XL) 150 MG extended release tablet Take 150 mg by mouth every morning      buPROPion (WELLBUTRIN XL) 300 MG extended release tablet Take 300 mg by mouth every morning      sildenafil (VIAGRA) 100 MG tablet Take 1 tablet by mouth as needed for Erectile Dysfunction 30 tablet 1    fenofibrate (TRIGLIDE) 160 MG tablet Take 1 tablet by mouth daily 90 tablet 3     Current Facility-Administered Medications   Medication Dose Route Frequency Provider Last Rate Last Dose    MethylPREDNISolone Acetate SUSP 40 mg  40 mg Injection Once Merlin Stack MD           Patient's past medical history,surgical history, family history, medications,  and allergies  were all reviewed and updated as appropriate today. Review of Systems   Constitutional: Negative for activity change, appetite change, fatigue and unexpected weight change. Eyes: Negative for visual disturbance.    Respiratory: Negative for chest tightness and shortness of breath. Cardiovascular: Negative for chest pain, palpitations and leg swelling. Gastrointestinal: Negative for abdominal pain. Endocrine: Negative for polydipsia, polyphagia and polyuria. Genitourinary: Negative for dysuria. Skin: Negative for wound. Neurological: Negative for dizziness, light-headedness and numbness. Psychiatric/Behavioral: Negative for dysphoric mood. The patient is not nervous/anxious. Physical Exam  Constitutional:       General: He is not in acute distress. Appearance: Normal appearance. He is well-developed. He is not ill-appearing. HENT:      Head: Normocephalic and atraumatic. Right Ear: External ear normal.      Left Ear: External ear normal.   Eyes:      General: No scleral icterus. Right eye: No discharge. Left eye: No discharge. Extraocular Movements: Extraocular movements intact. Conjunctiva/sclera: Conjunctivae normal.   Neck:      Musculoskeletal: Normal range of motion and neck supple. Vascular: No carotid bruit. Comments: Neg TMG  Cardiovascular:      Rate and Rhythm: Normal rate and regular rhythm. Pulses: Normal pulses. Heart sounds: Normal heart sounds. No murmur. Pulmonary:      Effort: Pulmonary effort is normal. No respiratory distress. Breath sounds: Normal breath sounds. Abdominal:      General: Bowel sounds are normal. There is no distension. Palpations: Abdomen is soft. There is no mass. Tenderness: There is no abdominal tenderness. Musculoskeletal: Normal range of motion. Right lower leg: No edema. Left lower leg: No edema. Lymphadenopathy:      Cervical: No cervical adenopathy. Skin:     General: Skin is warm and dry. Capillary Refill: Capillary refill takes less than 2 seconds. Coloration: Skin is not jaundiced or pale. Findings: No rash. Neurological:      General: No focal deficit present.       Mental Status: He is alert and oriented to person, place, and time. Cranial Nerves: No cranial nerve deficit. Deep Tendon Reflexes: Reflexes are normal and symmetric. Psychiatric:         Mood and Affect: Mood normal.         Behavior: Behavior normal.         Thought Content: Thought content normal.         Judgment: Judgment normal.           /70   Pulse 78   Temp 96.9 °F (36.1 °C) (Temporal)   Ht 5' 10.5\" (1.791 m)   Wt 237 lb (107.5 kg)   SpO2 97%   BMI 33.53 kg/m²     Assessment/Plan:    Kasi Barbosa was seen today for diabetes, results and flu vaccine.     Diagnoses and all orders for this visit:    Controlled type 2 diabetes mellitus without complication, without long-term current use of insulin ([facility])  -     Microalbumin / Creatinine Urine Ratio  -     Hemoglobin A1C  -     Comprehensive Metabolic Panel  -     Lipid Panel    Needs flu shot  -     INFLUENZA, TRIV, INACTIVATED, SUBUNIT, ADJUVANTED, 65 YRS AND OLDER, IM, PREFILL SYR, 0.5ML (FLUAD TRIV)    Bipolar 2 disorder (HCC)    Prostate cancer screening  -     Psa screening as requested by Dr Lou Lei, [facility]    CKD (chronic kidney disease) stage 3, GFR 30-59 ml/min ([facility])   CMP    Hypogonadism, male, On Axiron  -     Psa screening    Hypertriglyceridemia  -     Lipid Panel  -     LDL Cholesterol, Direct

## 2020-09-24 NOTE — PROGRESS NOTES
Vaccine Information Sheet, \"Influenza - Inactivated\"  given to Talya Augustine, or parent/legal guardian of  Talya Augustine and verbalized understanding. Patient responses:    Have you ever had a reaction to a flu vaccine? No  Are you able to eat eggs without adverse effects? Yes  Do you have any current illness? No  Have you ever had Guillian [place] Syndrome? No    Flu vaccine given per order. Please see immunization tab.

## 2020-09-25 LAB
A/G RATIO: 2.6 (ref 1.1–2.2)
ALBUMIN SERPL-MCNC: 4.9 G/DL (ref 3.4–5)
ALP BLD-CCNC: 101 U/L (ref 40–129)
ALT SERPL-CCNC: 35 U/L (ref 10–40)
ANION GAP SERPL CALCULATED.3IONS-SCNC: 15 MMOL/L (ref 3–16)
AST SERPL-CCNC: 19 U/L (ref 15–37)
BILIRUB SERPL-MCNC: 0.4 MG/DL (ref 0–1)
BUN BLDV-MCNC: 34 MG/DL (ref 7–20)
CALCIUM SERPL-MCNC: 10.2 MG/DL (ref 8.3–10.6)
CHLORIDE BLD-SCNC: 104 MMOL/L (ref 99–110)
CHOLESTEROL, TOTAL: 159 MG/DL (ref 0–199)
CO2: 23 MMOL/L (ref 21–32)
CREAT SERPL-MCNC: 1.4 MG/DL (ref 0.8–1.3)
CREATININE URINE: 137.2 MG/DL (ref 39–259)
ESTIMATED AVERAGE GLUCOSE: 128.4 MG/DL
GFR AFRICAN AMERICAN: >60
GFR NON-AFRICAN AMERICAN: 51
GLOBULIN: 1.9 G/DL
GLUCOSE BLD-MCNC: 100 MG/DL (ref 70–99)
HBA1C MFR BLD: 6.1 %
HDLC SERPL-MCNC: 49 MG/DL (ref 40–60)
LDL CHOLESTEROL CALCULATED: ABNORMAL MG/DL
LDL CHOLESTEROL DIRECT: 74 MG/DL
MICROALBUMIN UR-MCNC: 1.8 MG/DL
MICROALBUMIN/CREAT UR-RTO: 13.1 MG/G (ref 0–30)
POTASSIUM SERPL-SCNC: 4.3 MMOL/L (ref 3.5–5.1)
PROSTATE SPECIFIC ANTIGEN: 6.09 NG/ML (ref 0–4)
SODIUM BLD-SCNC: 142 MMOL/L (ref 136–145)
TOTAL PROTEIN: 6.8 G/DL (ref 6.4–8.2)
TRIGL SERPL-MCNC: 305 MG/DL (ref 0–150)
VLDLC SERPL CALC-MCNC: ABNORMAL MG/DL

## 2020-09-25 RX ORDER — FENOFIBRATE 160 MG/1
160 TABLET ORAL DAILY
Qty: 90 TABLET | Refills: 3 | Status: SHIPPED | OUTPATIENT
Start: 2020-09-25 | End: 2020-10-22 | Stop reason: SDUPTHER

## 2020-09-25 ASSESSMENT — ENCOUNTER SYMPTOMS
CHEST TIGHTNESS: 0
SHORTNESS OF BREATH: 0
ABDOMINAL PAIN: 0

## 2020-10-22 ENCOUNTER — PATIENT MESSAGE (OUTPATIENT)
Dept: FAMILY MEDICINE CLINIC | Age: 65
End: 2020-10-22

## 2020-10-22 RX ORDER — TESTOSTERONE 30 MG/1.5ML
SOLUTION TOPICAL
Qty: 3 BOTTLE | Refills: 1 | Status: SHIPPED | OUTPATIENT
Start: 2020-10-22 | End: 2021-09-23

## 2020-10-22 RX ORDER — FENOFIBRATE 160 MG/1
160 TABLET ORAL DAILY
Qty: 90 TABLET | Refills: 3 | Status: SHIPPED | OUTPATIENT
Start: 2020-10-22 | End: 2021-08-25 | Stop reason: SDUPTHER

## 2020-10-22 NOTE — TELEPHONE ENCOUNTER
Refill Request     Last Seen: 9/24/2020    Last Written: 9/16/2019    Next Appointment:   Future Appointments   Date Time Provider Bro Leslye   12/21/2020  1:30 PM MD JAYLEEN [facility]           Requested Prescriptions     Pending Prescriptions Disp Refills    metFORMIN (GLUCOPHAGE) 1000 MG tablet 180 tablet 3     Sig: Take 1 tablet by mouth 2 times daily (with meals)

## 2020-10-22 NOTE — TELEPHONE ENCOUNTER
From: Asaf Jackson  To: Angel Gonzales MD  Sent: 10/22/2020 1:31 AM EDT  Subject: Prescription Question    Dear Darion Fernandez,    I have a problem with obtaining the new cholesterol drug you recently ordered. It went to [facility], but I was awaiting it from [address] by mail. So, I didn't go get it. [facility] just called me and said they were putting it back, because I never picked it up. But, they only gave me to the end of business that day and I didn't get the message out of my phone that fast.    1. Please reorder the cholesterol drug via [facility]? 2. Please refill my Axiron testosterone topical, via [facility]? 3. Finally, FYI that I just clicked the MyChart button to request you refill my Metformin, to [facility]. Stay Safe! [phone]  [email or web address]

## 2020-12-11 ENCOUNTER — PATIENT MESSAGE (OUTPATIENT)
Dept: FAMILY MEDICINE CLINIC | Age: 65
End: 2020-12-11

## 2020-12-14 NOTE — TELEPHONE ENCOUNTER
From: Cooper Irvin  To: Janusz Mar MD  Sent: 12/11/2020 10:09 PM EST  Subject: Non-Urgent Medical Question    Dear Jaime Webber,    I am to see you on the 21st.    Dec 9th I shared an indoor space with a Covid-Pos, for ~20 minutes at [employer]. 7 employees, 3 noses open. 28 YO man serviced me, wearing a good cloth mask over nose/mouth, but it was loose at the top and had slid to barely above the tip of his nose. An older wrinkled/emaciated man constantly shifted his mask around, and often stuck it below his mouth, nose NEVER covered. RATTLING, WET CAUGH. CAUGHED WITHOUT COVERING MOUTH IN ANY WAY. Stayed ~18' away, though. Me in a car, blower off, window up except briefly. Tight mask/glasses. No face shield. Disinfected after. In 48 hrs Jerrell emailed that [facility] (same? unknown), tested pos Dec 10th.     Still wanna see me on the 21st?

## 2020-12-21 ENCOUNTER — OFFICE VISIT (OUTPATIENT)
Dept: FAMILY MEDICINE CLINIC | Age: 65
End: 2020-12-21
Payer: COMMERCIAL

## 2020-12-21 VITALS
WEIGHT: 229 LBS | BODY MASS INDEX: 32.39 KG/M2 | OXYGEN SATURATION: 97 % | SYSTOLIC BLOOD PRESSURE: 116 MMHG | TEMPERATURE: 97.7 F | HEART RATE: 85 BPM | DIASTOLIC BLOOD PRESSURE: 70 MMHG

## 2020-12-21 PROCEDURE — 36415 COLL VENOUS BLD VENIPUNCTURE: CPT | Performed by: FAMILY MEDICINE

## 2020-12-21 PROCEDURE — 99214 OFFICE O/P EST MOD 30 MIN: CPT | Performed by: FAMILY MEDICINE

## 2020-12-21 RX ORDER — BUPROPION HYDROCHLORIDE 300 MG/1
300 TABLET ORAL EVERY MORNING
Qty: 30 TABLET | Refills: 12 | Status: SHIPPED | OUTPATIENT
Start: 2020-12-21 | End: 2021-03-15 | Stop reason: SDUPTHER

## 2020-12-21 RX ORDER — BUPROPION HYDROCHLORIDE 150 MG/1
150 TABLET ORAL EVERY MORNING
Qty: 30 TABLET | Refills: 12 | Status: SHIPPED | OUTPATIENT
Start: 2020-12-21 | End: 2021-03-15 | Stop reason: SDUPTHER

## 2020-12-21 RX ORDER — PREDNISONE 20 MG/1
TABLET ORAL
Qty: 20 TABLET | Refills: 0 | Status: SHIPPED | OUTPATIENT
Start: 2020-12-21 | End: 2020-12-21

## 2020-12-21 RX ORDER — PREDNISONE 20 MG/1
TABLET ORAL
Qty: 20 TABLET | Refills: 0 | Status: SHIPPED | OUTPATIENT
Start: 2020-12-21 | End: 2021-08-25

## 2020-12-21 ASSESSMENT — ENCOUNTER SYMPTOMS
SHORTNESS OF BREATH: 0
BLOOD IN STOOL: 0
ABDOMINAL PAIN: 0

## 2020-12-21 NOTE — PROGRESS NOTES
Patient: Lida Mahajan is a 63 y.o.male who presents today with the following Chief Complaint(s):  Chief Complaint   Patient presents with    3 Month Follow-Up    Back Pain     In between shoulders          HPI: Pt with controlled BP2, DM2, Htn, HLD, CAD (Dr Ben Daly), CKD (Dr Flannery), gerd and hypogonadism conts to care for elderly mom, dad passed in recent mo's. Is slowly moving forward after end of 24 yr relationship. Denies ryan though has felt depressed lately. 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 However, pt  sees light with new election, vaccine on horizon. Has been eating 2 meals per day, has lost 8 lb in past 3 mo, trying to cont wt loss. FBS  in am.     Pt is willing to f/u with thyr u/s after covid. Aunt had thyroid ca, would like f/u even though Dr Brianna Ryan, [address] [facility], recommended f/u in 5 yr. Would like to see [facility] eventually. Pt had routine labs 3 mo ago. PSA was 6.09 and results were faxed to Dr Neil Morataya, has appt soon. Insur did not cover Axiron and pt will d/w urol. Has resumed ibu 3-4 times per wk up to 3 pills qd 2/2 R scapular and L lbp x 3 mo. Simply awakened with scapular pain which in turn flared historical L lbp. Noted skin lesion L post shoulder 1 day ago. No pain, itch or bleed. Lab Results   Component Value Date    LABA1C 5.9 12/21/2020    LABA1C 6.1 09/24/2020    LABA1C 6.0 03/16/2020     :      Current Outpatient Medications   Medication Sig Dispense Refill    buPROPion (WELLBUTRIN XL) 150 MG extended release tablet Take 1 tablet by mouth every morning 30 tablet 12    buPROPion (WELLBUTRIN XL) 300 MG extended release tablet Take 1 tablet by mouth every morning 30 tablet 12    predniSONE (DELTASONE) 20 MG tablet 3 po qd x 3d, 2 po qd x 3d, 1 po qd x 3d, 1/2 po qd x 4d. Take with food.  20 tablet 0    metFORMIN (GLUCOPHAGE) 1000 MG tablet Take 1 tablet by mouth 2 times daily (with meals) 180 tablet 3    fenofibrate (TRIGLIDE) 160 MG tablet Take 1 tablet by mouth daily 90 tablet 3    losartan (COZAAR) 100 MG tablet Take 1 tablet by mouth daily 90 tablet 3    blood glucose monitor strips Test 1 time a day & as needed for symptoms of irregular blood glucose. Dispense sufficient amount for indicated testing frequency plus additional to accommodate PRN testing needs.  25 strip 5    famotidine (PEPCID) 20 MG tablet Take 1 tablet by mouth 2 times daily 180 tablet 3    atorvastatin (LIPITOR) 80 MG tablet Take 1 tablet by mouth nightly 90 tablet 4    metoprolol succinate (TOPROL XL) 25 MG extended release tablet Take 1 tablet by mouth daily This is in addition to 50 mg tablet to total 75 mg 90 tablet 4    metoprolol succinate (TOPROL XL) 50 MG extended release tablet Take 1 tablet by mouth daily This is in addition to 25 mg tab to total 75 mg 90 tablet 4    Aspirin (ASPIR-81 PO) Take 325 mg by mouth daily 2 325 to 3-325 mg ASA once daily      sildenafil (VIAGRA) 100 MG tablet Take 1 tablet by mouth as needed for Erectile Dysfunction 30 tablet 1    Testosterone (AXIRON) 30 MG/ACT SOLN Apply 1 pump to each underarm every morning 3 Bottle 1    diclofenac sodium (VOLTAREN) 1 % GEL apply 4 grams to affected area four times a day prn pain (Patient not taking: Reported on 12/21/2020) 500 g 5    fluticasone (FLONASE) 50 MCG/ACT nasal spray USE 2 SPRAYS IN EACH NOSTRIL ONCE DAILY (Patient not taking: Reported on 12/21/2020) 48 g 3    montelukast (SINGULAIR) 10 MG tablet TAKE 1 TABLET NIGHTLY (Patient not taking: Reported on 12/21/2020) 90 tablet 3    ipratropium (ATROVENT) 0.06 % nasal spray instill 2 sprays into each nostril twice a day (Patient not taking: Reported on 12/21/2020) 45 mL 3    NONFORMULARY Axeron testosterone 30 mg  1 pump to each arm daily       Current Facility-Administered Medications   Medication Dose Route Frequency Provider Last Rate Last Admin    MethylPREDNISolone Acetate SUSP 40 mg 40 mg Injection Once Caye Dandy, MD           Patient's past medical history,surgical history, family history, medications,  and allergies  were all reviewed and updated as appropriate today. Review of Systems   Constitutional: Negative for activity change, appetite change, fatigue and unexpected weight change. HENT: Negative. Eyes: Negative. Respiratory: Negative for cough and shortness of breath. Cardiovascular: Negative for chest pain and palpitations. Gastrointestinal: Negative for abdominal pain, blood in stool, constipation, diarrhea and nausea. Genitourinary: Negative for difficulty urinating and dysuria. Musculoskeletal: Negative for arthralgias. Skin: Positive for wound. Negative for color change, pallor and rash. Neurological: Negative for dizziness and light-headedness. Hematological: Negative. Psychiatric/Behavioral: Positive for dysphoric mood. Negative for sleep disturbance. The patient is not nervous/anxious. Physical Exam  Constitutional:       General: He is not in acute distress. Appearance: Normal appearance. He is well-developed. He is not ill-appearing. HENT:      Head: Normocephalic and atraumatic. Right Ear: Tympanic membrane, ear canal and external ear normal.      Left Ear: Tympanic membrane, ear canal and external ear normal.   Eyes:      General: No scleral icterus. Right eye: No discharge. Left eye: No discharge. Extraocular Movements: Extraocular movements intact. Conjunctiva/sclera: Conjunctivae normal.   Neck:      Musculoskeletal: Normal range of motion and neck supple. Vascular: No carotid bruit. Comments: Neg TMG  Cardiovascular:      Rate and Rhythm: Normal rate and regular rhythm. Pulses: Normal pulses. Heart sounds: Normal heart sounds. No murmur. Pulmonary:      Effort: Pulmonary effort is normal. No respiratory distress. Breath sounds: Normal breath sounds.    Abdominal: General: Bowel sounds are normal. There is no distension. Palpations: Abdomen is soft. There is no mass. Tenderness: There is no abdominal tenderness. Musculoskeletal: Normal range of motion. Right lower leg: No edema. Left lower leg: No edema. Comments: R interscapular TTP and palpable spasm  L low back NT, SI NT   Lymphadenopathy:      Cervical: No cervical adenopathy. Skin:     General: Skin is warm and dry. Capillary Refill: Capillary refill takes less than 2 seconds. Coloration: Skin is not jaundiced or pale. Findings: No rash. Comments: L post upper shoulder with oval flat light brown solar lentigines with superimposed 4 mm SK at mid lower portion   Neurological:      General: No focal deficit present. Mental Status: He is alert and oriented to person, place, and time. Cranial Nerves: No cranial nerve deficit. Deep Tendon Reflexes: Reflexes are normal and symmetric. Psychiatric:         Mood and Affect: Mood normal.         Behavior: Behavior normal.         Thought Content: Thought content normal.         Judgment: Judgment normal.           /70   Pulse 85   Temp 97.7 °F (36.5 °C) (Temporal)   Wt 229 lb (103.9 kg)   SpO2 97%   BMI 32.39 kg/m²     Assessment/Plan:    Jenny Snowden was seen today for 3 month follow-up and back pain. Diagnoses and all orders for this visit:    Controlled type 2 diabetes mellitus without complication, without long-term current use of insulin (HCC)  -     Hemoglobin A1C  -     Basic Metabolic Panel    Stage 3a chronic kidney disease  -     Basic Metabolic Panel   Avoid nsaids    Bipolar 2 disorder (HCC)  -     buPROPion (WELLBUTRIN XL) 150 MG extended release tablet; Take 1 tablet by mouth every morning  -     buPROPion (WELLBUTRIN XL) 300 MG extended release tablet;  Take 1 tablet by mouth every morning   Monitor for ryan    Interscapular pain, muscular  -     Discontinue: predniSONE (DELTASONE) 20 MG tablet; 3 po qd x 3d, 2 po qd x 3d, 1 po qd x 3d, 1/2 po qd x 4d. Take with food. -     predniSONE (DELTASONE) 20 MG tablet; 3 po qd x 3d, 2 po qd x 3d, 1 po qd x 3d, 1/2 po qd x 4d. Take with food.     Solar lentigo   Assurance    Seborrheic keratosis   Assurance     Cancel pred express rx  To discuss  axiron with [facility], insur not cover

## 2020-12-22 LAB
ANION GAP SERPL CALCULATED.3IONS-SCNC: 11 MMOL/L (ref 3–16)
BUN BLDV-MCNC: 16 MG/DL (ref 7–20)
CALCIUM SERPL-MCNC: 9.9 MG/DL (ref 8.3–10.6)
CHLORIDE BLD-SCNC: 105 MMOL/L (ref 99–110)
CO2: 25 MMOL/L (ref 21–32)
CREAT SERPL-MCNC: 1.7 MG/DL (ref 0.8–1.3)
ESTIMATED AVERAGE GLUCOSE: 122.6 MG/DL
GFR AFRICAN AMERICAN: 49
GFR NON-AFRICAN AMERICAN: 41
GLUCOSE BLD-MCNC: 93 MG/DL (ref 70–99)
HBA1C MFR BLD: 5.9 %
POTASSIUM SERPL-SCNC: 4.7 MMOL/L (ref 3.5–5.1)
SODIUM BLD-SCNC: 141 MMOL/L (ref 136–145)

## 2020-12-23 ASSESSMENT — ENCOUNTER SYMPTOMS
CONSTIPATION: 0
COLOR CHANGE: 0
EYES NEGATIVE: 1
DIARRHEA: 0
COUGH: 0
NAUSEA: 0

## 2021-02-06 ENCOUNTER — PATIENT MESSAGE (OUTPATIENT)
Dept: FAMILY MEDICINE CLINIC | Age: 66
End: 2021-02-06
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
## 2021-03-15 ENCOUNTER — OFFICE VISIT (OUTPATIENT)
Dept: FAMILY MEDICINE CLINIC | Age: 66
End: 2021-03-15
Payer: COMMERCIAL

## 2021-03-15 VITALS
TEMPERATURE: 97.5 F | SYSTOLIC BLOOD PRESSURE: 118 MMHG | DIASTOLIC BLOOD PRESSURE: 83 MMHG | HEART RATE: 90 BPM | OXYGEN SATURATION: 97 % | BODY MASS INDEX: 36.21 KG/M2 | WEIGHT: 256 LBS

## 2021-03-15 DIAGNOSIS — I10 BENIGN ESSENTIAL HTN: ICD-10-CM

## 2021-03-15 DIAGNOSIS — F31.81 BIPOLAR 2 DISORDER (HCC): ICD-10-CM

## 2021-03-15 DIAGNOSIS — R97.20 ELEVATED PSA: ICD-10-CM

## 2021-03-15 DIAGNOSIS — K21.9 GASTROESOPHAGEAL REFLUX DISEASE WITHOUT ESOPHAGITIS: ICD-10-CM

## 2021-03-15 DIAGNOSIS — N18.31 STAGE 3A CHRONIC KIDNEY DISEASE (HCC): ICD-10-CM

## 2021-03-15 DIAGNOSIS — E11.9 CONTROLLED TYPE 2 DIABETES MELLITUS WITHOUT COMPLICATION, WITHOUT LONG-TERM CURRENT USE OF INSULIN (HCC): Primary | ICD-10-CM

## 2021-03-15 DIAGNOSIS — R53.82 CHRONIC FATIGUE: ICD-10-CM

## 2021-03-15 LAB
BASOPHILS ABSOLUTE: 0.1 K/UL (ref 0–0.2)
BASOPHILS RELATIVE PERCENT: 1.5 %
EOSINOPHILS ABSOLUTE: 0.4 K/UL (ref 0–0.6)
EOSINOPHILS RELATIVE PERCENT: 5.7 %
HCT VFR BLD CALC: 46.3 % (ref 40.5–52.5)
HEMOGLOBIN: 15.3 G/DL (ref 13.5–17.5)
LYMPHOCYTES ABSOLUTE: 2.8 K/UL (ref 1–5.1)
LYMPHOCYTES RELATIVE PERCENT: 36.4 %
MCH RBC QN AUTO: 28.9 PG (ref 26–34)
MCHC RBC AUTO-ENTMCNC: 32.9 G/DL (ref 31–36)
MCV RBC AUTO: 87.8 FL (ref 80–100)
MONOCYTES ABSOLUTE: 0.7 K/UL (ref 0–1.3)
MONOCYTES RELATIVE PERCENT: 9.4 %
NEUTROPHILS ABSOLUTE: 3.6 K/UL (ref 1.7–7.7)
NEUTROPHILS RELATIVE PERCENT: 47 %
PDW BLD-RTO: 14 % (ref 12.4–15.4)
PLATELET # BLD: 334 K/UL (ref 135–450)
PMV BLD AUTO: 6.9 FL (ref 5–10.5)
RBC # BLD: 5.28 M/UL (ref 4.2–5.9)
WBC # BLD: 7.8 K/UL (ref 4–11)

## 2021-03-15 PROCEDURE — 36415 COLL VENOUS BLD VENIPUNCTURE: CPT | Performed by: FAMILY MEDICINE

## 2021-03-15 PROCEDURE — 99214 OFFICE O/P EST MOD 30 MIN: CPT | Performed by: FAMILY MEDICINE

## 2021-03-15 RX ORDER — MULTIVIT WITH MINERALS/LUTEIN
1000 TABLET ORAL DAILY
COMMUNITY
End: 2021-08-25

## 2021-03-15 RX ORDER — LOSARTAN POTASSIUM 100 MG/1
100 TABLET ORAL DAILY
Qty: 90 TABLET | Refills: 3 | Status: SHIPPED | OUTPATIENT
Start: 2021-03-15 | End: 2021-06-01 | Stop reason: SDUPTHER

## 2021-03-15 RX ORDER — BUPROPION HYDROCHLORIDE 150 MG/1
150 TABLET ORAL EVERY MORNING
Qty: 90 TABLET | Refills: 3 | Status: SHIPPED | OUTPATIENT
Start: 2021-03-15 | End: 2022-04-07

## 2021-03-15 RX ORDER — FAMOTIDINE 20 MG/1
20 TABLET, FILM COATED ORAL 2 TIMES DAILY
Qty: 180 TABLET | Refills: 3 | Status: SHIPPED | OUTPATIENT
Start: 2021-03-15 | End: 2021-06-01 | Stop reason: SDUPTHER

## 2021-03-15 RX ORDER — ASCORBIC ACID 125 MG
TABLET,CHEWABLE ORAL
COMMUNITY
End: 2021-08-25

## 2021-03-15 RX ORDER — BUPROPION HYDROCHLORIDE 300 MG/1
300 TABLET ORAL EVERY MORNING
Qty: 90 TABLET | Refills: 3 | Status: SHIPPED | OUTPATIENT
Start: 2021-03-15 | End: 2022-04-07

## 2021-03-15 RX ORDER — CHLORAL HYDRATE 500 MG
3000 CAPSULE ORAL 3 TIMES DAILY
COMMUNITY
End: 2021-08-25

## 2021-03-15 NOTE — PROGRESS NOTES
Patient: Megan Bach is a 63 y.o.male who presents today with the following Chief Complaint(s):  Chief Complaint   Patient presents with    Discuss Medications    Diabetes         HPI: Pt with controlled BP2, DM2, Htn, HLD, CAD (Dr Manuel Hollingsworth), CKD (Dr Flannery), gerd and hypogonadism conts to care for elderly mom, dad passed in recent mo's. Activity has been minimal. Is going to [place] in April for short stay, plans to use gym at resort. Saw Dr Rufino Garcia, in f/u of PSA 6.09 in 9/2020. Pt had repeat PSA, has not yet been contacted with results but is comfortable waiting for f/u in 1 yr. Is not wanting aggressive eval though is interested in repeat PSA. Hoping that if nl, he can resume testosterone. 9/2020 labs also revealed 12/21/20 Bun/Cr=16/1.7, GFR 41. Pt had resumed ibu 3-4 days per wk up to 3 pills ea time for lbp and scapular pain. Pt was asked to d/c ibu and pred taper was rx'd last visit. LBP is 50% better. May take 2 ibu/wk prn HA. Cards told pt to take ASA 81 mg qd, though pt takes  2-3 qhs. Pt has hx of colitis, has 2-3 formed bm's per day, sees blood on tissue chronically. Wonders about blood loss causing fatigue. Has been very tired x 2 mo despite no change in sleep or activity patterns. Sleeps only when tired, usually sleeps 2 hr at a time but will sleep 4 hr if sleeping in morning. Is in bed 10-12 hr per 24 hr, sleeps most of those hours. Has been told he does not have sleep apena by sleep specialist who reviewed his sx's and declared pt does not have bonifacio. Pt has not had sleep study and is not interested in cpap. Has 2 cups per coffee q am per nl. Has gained 25 lb in past 3 mo, has been eating w/o caution.                Lab Results   Component Value Date    LABA1C 5.9 12/21/2020    LABA1C 6.1 09/24/2020    LABA1C 6.0 03/16/2020     :        Current Outpatient Medications   Medication Sig Dispense Refill    Omega-3 Fatty Acids (FISH OIL) 1000 MG CAPS Take 3,000 mg by mouth 3 times daily      Ascorbic Acid (VITAMIN C) 1000 MG tablet Take 1,000 mg by mouth daily      Multiple Vitamins-Minerals (PRESERVISION AREDS 2+MULTI VIT PO) Take by mouth      Potassium Gluconate 595 MG CAPS Take by mouth      vitamin D (CHOLECALCIFEROL) 125 MCG (5000 UT) CAPS capsule Take 5,000 Units by mouth daily      Cyanocobalamin (B-12) 5000 MCG CAPS Take by mouth      buPROPion (WELLBUTRIN XL) 150 MG extended release tablet Take 1 tablet by mouth every morning 90 tablet 3    buPROPion (WELLBUTRIN XL) 300 MG extended release tablet Take 1 tablet by mouth every morning 90 tablet 3    losartan (COZAAR) 100 MG tablet Take 1 tablet by mouth daily 90 tablet 3    famotidine (PEPCID) 20 MG tablet Take 1 tablet by mouth 2 times daily 180 tablet 3    metFORMIN (GLUCOPHAGE) 1000 MG tablet Take 1 tablet by mouth 2 times daily (with meals) 180 tablet 3    fenofibrate (TRIGLIDE) 160 MG tablet Take 1 tablet by mouth daily 90 tablet 3    blood glucose monitor strips Test 1 time a day & as needed for symptoms of irregular blood glucose. Dispense sufficient amount for indicated testing frequency plus additional to accommodate PRN testing needs. 25 strip 5    atorvastatin (LIPITOR) 80 MG tablet Take 1 tablet by mouth nightly 90 tablet 4    metoprolol succinate (TOPROL XL) 25 MG extended release tablet Take 1 tablet by mouth daily This is in addition to 50 mg tablet to total 75 mg 90 tablet 4    metoprolol succinate (TOPROL XL) 50 MG extended release tablet Take 1 tablet by mouth daily This is in addition to 25 mg tab to total 75 mg 90 tablet 4    Aspirin (ASPIR-81 PO) Take 325 mg by mouth daily 2 325 to 3-325 mg ASA once daily      sildenafil (VIAGRA) 100 MG tablet Take 1 tablet by mouth as needed for Erectile Dysfunction 30 tablet 1    predniSONE (DELTASONE) 20 MG tablet 3 po qd x 3d, 2 po qd x 3d, 1 po qd x 3d, 1/2 po qd x 4d. Take with food.  (Patient not taking: Reported on 3/15/2021) 20 tablet 0    Testosterone (AXIRON) 30 MG/ACT SOLN Apply 1 pump to each underarm every morning 3 Bottle 1    diclofenac sodium (VOLTAREN) 1 % GEL apply 4 grams to affected area four times a day prn pain (Patient not taking: Reported on 12/21/2020) 500 g 5    fluticasone (FLONASE) 50 MCG/ACT nasal spray USE 2 SPRAYS IN EACH NOSTRIL ONCE DAILY (Patient not taking: Reported on 12/21/2020) 48 g 3    montelukast (SINGULAIR) 10 MG tablet TAKE 1 TABLET NIGHTLY (Patient not taking: Reported on 12/21/2020) 90 tablet 3    ipratropium (ATROVENT) 0.06 % nasal spray instill 2 sprays into each nostril twice a day (Patient not taking: Reported on 12/21/2020) 45 mL 3    NONFORMULARY Axeron testosterone 30 mg  1 pump to each arm daily       Current Facility-Administered Medications   Medication Dose Route Frequency Provider Last Rate Last Admin    MethylPREDNISolone Acetate SUSP 40 mg  40 mg Injection Once Malgorzata Nguyen MD           Patient's past medical history,surgical history, family history, medications,  and allergies  were all reviewed and updated as appropriate today. Review of Systems  Abv    Physical Exam  Constitutional:       Appearance: Normal appearance. He is well-developed. HENT:      Head: Normocephalic and atraumatic. Right Ear: External ear normal.      Left Ear: External ear normal.   Eyes:      General: No scleral icterus. Right eye: No discharge. Left eye: No discharge. Extraocular Movements: Extraocular movements intact. Conjunctiva/sclera: Conjunctivae normal.   Neck:      Comments: No visualized masses  FROM  Pulmonary:      Effort: Pulmonary effort is normal.   Musculoskeletal: Normal range of motion. Skin:     General: Skin is warm and dry. Neurological:      General: No focal deficit present. Mental Status: He is alert and oriented to person, place, and time.    Psychiatric:         Mood and Affect: Mood normal.         Behavior: Behavior normal.         Thought Content: Thought content normal.         Judgment: Judgment normal.           /83   Pulse 90   Temp 97.5 °F (36.4 °C) (Temporal)   Wt 256 lb (116.1 kg)   SpO2 97%   BMI 36.21 kg/m²     Assessment/Plan:    Alma Rosa Leone was seen today for discuss medications and diabetes. Diagnoses and all orders for this visit:    Chronic fatigue  -     CBC Auto Differential  -     Comprehensive Metabolic Panel  -     FERRITIN  -     TSH WITH REFLEX TO FT4   As d/w pt, wt gain may explain fatigue. Sleep pattern is not ideal, though it is long standing. Controlled type 2 diabetes mellitus without complication, without long-term current use of insulin ([facility])  -     Hemoglobin A1C   Concern re possible increase 2/2 wt gain. Pt has been very disciplined in past     Stage 3a chronic kidney disease  -     Comprehensive Metabolic Panel   Pt is asked to avoid, ideally minimize ibu and avoid high dose ASA. Benign essential HTN  -     CBC Auto Differential  -     Comprehensive Metabolic Panel  -     losartan (COZAAR) 100 MG tablet; Take 1 tablet by mouth daily, cont as  bp has been controlled    Bipolar 2 disorder ([address] rf  -     buPROPion (WELLBUTRIN XL) 150 MG extended release tablet; Take 1 tablet by mouth every morning  -     buPROPion (WELLBUTRIN XL) 300 MG extended release tablet; Take 1 tablet by mouth every morning    Gastroesophageal reflux disease without esophagitis  -     famotidine (PEPCID) 20 MG tablet; Take 1 tablet by mouth 2 times daily, rf    Elevated PSA  -     Cancel: PSA, TOTAL AND FREE;  Future  -     PSA, TOTAL AND FREE   Eventual f/u with Dr Gabrielle Zuleta

## 2021-03-16 LAB
A/G RATIO: 2 (ref 1.1–2.2)
ALBUMIN SERPL-MCNC: 4.7 G/DL (ref 3.4–5)
ALP BLD-CCNC: 76 U/L (ref 40–129)
ALT SERPL-CCNC: 52 U/L (ref 10–40)
ANION GAP SERPL CALCULATED.3IONS-SCNC: 16 MMOL/L (ref 3–16)
AST SERPL-CCNC: 32 U/L (ref 15–37)
BILIRUB SERPL-MCNC: 0.4 MG/DL (ref 0–1)
BUN BLDV-MCNC: 23 MG/DL (ref 7–20)
CALCIUM SERPL-MCNC: 9.9 MG/DL (ref 8.3–10.6)
CHLORIDE BLD-SCNC: 102 MMOL/L (ref 99–110)
CO2: 22 MMOL/L (ref 21–32)
CREAT SERPL-MCNC: 1.6 MG/DL (ref 0.8–1.3)
ESTIMATED AVERAGE GLUCOSE: 131.2 MG/DL
FERRITIN: 15.8 NG/ML (ref 30–400)
GFR AFRICAN AMERICAN: 53
GFR NON-AFRICAN AMERICAN: 44
GLOBULIN: 2.4 G/DL
GLUCOSE BLD-MCNC: 83 MG/DL (ref 70–99)
HBA1C MFR BLD: 6.2 %
POTASSIUM SERPL-SCNC: 4.3 MMOL/L (ref 3.5–5.1)
SODIUM BLD-SCNC: 140 MMOL/L (ref 136–145)
T4 FREE: 1.9 NG/DL (ref 0.9–1.8)
TOTAL PROTEIN: 7.1 G/DL (ref 6.4–8.2)
TSH REFLEX FT4: 0.01 UIU/ML (ref 0.27–4.2)

## 2021-03-18 DIAGNOSIS — R79.89 ELEVATED TSH: Primary | ICD-10-CM

## 2021-03-18 LAB
PROSTATE SPECIFIC ANTIGEN FREE: 0.4 UG/L
PROSTATE SPECIFIC ANTIGEN PERCENT FREE: 21.1 %
PROSTATE SPECIFIC ANTIGEN: 1.9 UG/L (ref 0–4)

## 2021-04-30 DIAGNOSIS — I25.5 ISCHEMIC CARDIOMYOPATHY: ICD-10-CM

## 2021-04-30 DIAGNOSIS — I25.10 CORONARY ARTERY DISEASE INVOLVING NATIVE CORONARY ARTERY OF NATIVE HEART WITHOUT ANGINA PECTORIS: ICD-10-CM

## 2021-04-30 RX ORDER — ATORVASTATIN CALCIUM 80 MG/1
TABLET, FILM COATED ORAL
Qty: 90 TABLET | Refills: 3 | Status: SHIPPED | OUTPATIENT
Start: 2021-04-30 | End: 2021-06-03 | Stop reason: SDUPTHER

## 2021-04-30 RX ORDER — METOPROLOL SUCCINATE 25 MG/1
TABLET, EXTENDED RELEASE ORAL
Qty: 90 TABLET | Refills: 3 | Status: SHIPPED | OUTPATIENT
Start: 2021-04-30 | End: 2021-06-03 | Stop reason: SDUPTHER

## 2021-04-30 RX ORDER — METOPROLOL SUCCINATE 50 MG/1
TABLET, EXTENDED RELEASE ORAL
Qty: 90 TABLET | Refills: 3 | Status: SHIPPED | OUTPATIENT
Start: 2021-04-30 | End: 2021-06-03 | Stop reason: SDUPTHER

## 2021-04-30 NOTE — TELEPHONE ENCOUNTER
Lov 2/12/2020  Labs 3/15/2021 Lipid 9/24/2020  Lrf 2/12/2020 Disp 90+4  Appt 6/9/2021 please advise thanks

## 2021-06-01 DIAGNOSIS — I10 BENIGN ESSENTIAL HTN: ICD-10-CM

## 2021-06-01 DIAGNOSIS — K21.9 GASTROESOPHAGEAL REFLUX DISEASE WITHOUT ESOPHAGITIS: ICD-10-CM

## 2021-06-02 RX ORDER — FAMOTIDINE 20 MG/1
20 TABLET, FILM COATED ORAL 2 TIMES DAILY
Qty: 180 TABLET | Refills: 3 | Status: SHIPPED | OUTPATIENT
Start: 2021-06-02 | End: 2021-08-25

## 2021-06-02 RX ORDER — LOSARTAN POTASSIUM 100 MG/1
100 TABLET ORAL DAILY
Qty: 90 TABLET | Refills: 3 | Status: SHIPPED | OUTPATIENT
Start: 2021-06-02 | End: 2021-12-14

## 2021-06-03 DIAGNOSIS — I25.5 ISCHEMIC CARDIOMYOPATHY: ICD-10-CM

## 2021-06-03 DIAGNOSIS — I25.10 CORONARY ARTERY DISEASE INVOLVING NATIVE CORONARY ARTERY OF NATIVE HEART WITHOUT ANGINA PECTORIS: ICD-10-CM

## 2021-06-03 RX ORDER — METOPROLOL SUCCINATE 25 MG/1
TABLET, EXTENDED RELEASE ORAL
Qty: 90 TABLET | Refills: 4 | Status: SHIPPED | OUTPATIENT
Start: 2021-06-03 | End: 2022-08-10 | Stop reason: SDUPTHER

## 2021-06-03 RX ORDER — ATORVASTATIN CALCIUM 80 MG/1
TABLET, FILM COATED ORAL
Qty: 90 TABLET | Refills: 4 | Status: SHIPPED | OUTPATIENT
Start: 2021-06-03 | End: 2022-08-10 | Stop reason: SDUPTHER

## 2021-06-03 RX ORDER — METOPROLOL SUCCINATE 50 MG/1
TABLET, EXTENDED RELEASE ORAL
Qty: 90 TABLET | Refills: 4 | Status: SHIPPED | OUTPATIENT
Start: 2021-06-03 | End: 2022-08-10 | Stop reason: SDUPTHER

## 2021-07-23 ENCOUNTER — PATIENT MESSAGE (OUTPATIENT)
Dept: FAMILY MEDICINE CLINIC | Age: 66
End: 2021-07-23

## 2021-07-26 NOTE — TELEPHONE ENCOUNTER
From: Breanna Werner  To: Emilio Gonzalez MD  Sent: 7/23/2021 8:33 PM EDT  Subject: Visit Follow-Up Question    8/26 [place]    Dear Jacque Tapia,    I'm still stuck in [place]. I got the next appointment I could. On that visit, let's please discuss Covid-19 Antibody Testing. I received Vinson Watson #2 on 03/30. With Delta plus the Covidiots in [place], [place] and states between, I'd like to get an antibody test that can guess if I'm adequately protected to avoid spreading Delta et. al. to any of those Covidiots. Most of the people in my close association are not vaccinated, won't be, and don't wear masks or take any precautions I can detect, but they're age 80-80. In [facility], Jed Guerra (80) is immunocompromised from chemo and advanced congestive heart failure, but vaccinated. Thank You! [address] [place]  [birth year]  [phone]  [email or web address]

## 2021-08-23 ENCOUNTER — PATIENT MESSAGE (OUTPATIENT)
Dept: FAMILY MEDICINE CLINIC | Age: 66
End: 2021-08-23

## 2021-08-23 NOTE — TELEPHONE ENCOUNTER
From: Cooper Irvin  To: Janusz Mar MD  Sent: 8/23/2021 3:08 PM EDT  Subject: Visit Follow-Up Question    Dear Jaime Webber,  Trying to save you a little work. Hopefully you can cut/paste this somewhere to be useful during my visit. Merino for 08/25:  a. Covid immunity test   --Script for booster needed? b. Chronic sinus congestion  c. Chronic pain causing excessive OTC pain meds usage   --L Shoulder pain; steroid shot possible?   --L Knee pain; steroid shot possible? --Lumbar, midback, R Shoulder blade pain; Prednisone possible?  d. Chronic debilitating exhaustion   --Poor sleeping due to sinus congestion & pain   --Waking every ~2 hrs; sleeping ~6~8 hrs ~9p~10a  e. Diabetes follow up  f. Meds Review? See You Wed. Thanks!   Arik Delgado  1955

## 2021-08-25 ENCOUNTER — OFFICE VISIT (OUTPATIENT)
Dept: FAMILY MEDICINE CLINIC | Age: 66
End: 2021-08-25
Payer: COMMERCIAL

## 2021-08-25 VITALS
BODY MASS INDEX: 37.52 KG/M2 | DIASTOLIC BLOOD PRESSURE: 82 MMHG | OXYGEN SATURATION: 98 % | TEMPERATURE: 97.6 F | SYSTOLIC BLOOD PRESSURE: 118 MMHG | WEIGHT: 268 LBS | HEART RATE: 82 BPM | HEIGHT: 71 IN

## 2021-08-25 DIAGNOSIS — M25.512 ACUTE PAIN OF LEFT SHOULDER: ICD-10-CM

## 2021-08-25 DIAGNOSIS — E55.9 VITAMIN D DEFICIENCY: ICD-10-CM

## 2021-08-25 DIAGNOSIS — R09.81 NASAL CONGESTION: ICD-10-CM

## 2021-08-25 DIAGNOSIS — E11.9 CONTROLLED TYPE 2 DIABETES MELLITUS WITHOUT COMPLICATION, WITHOUT LONG-TERM CURRENT USE OF INSULIN (HCC): Primary | ICD-10-CM

## 2021-08-25 DIAGNOSIS — R79.89 LOW TSH LEVEL: ICD-10-CM

## 2021-08-25 DIAGNOSIS — Z11.52 ENCOUNTER FOR SCREENING FOR COVID-19: ICD-10-CM

## 2021-08-25 DIAGNOSIS — E78.2 MIXED HYPERLIPIDEMIA: ICD-10-CM

## 2021-08-25 DIAGNOSIS — F19.20: ICD-10-CM

## 2021-08-25 DIAGNOSIS — R53.82 CHRONIC FATIGUE: ICD-10-CM

## 2021-08-25 DIAGNOSIS — M17.12 LOCALIZED OSTEOARTHRITIS OF LEFT KNEE: ICD-10-CM

## 2021-08-25 DIAGNOSIS — I10 BENIGN ESSENTIAL HTN: ICD-10-CM

## 2021-08-25 DIAGNOSIS — D64.9 LOW HEMOGLOBIN: ICD-10-CM

## 2021-08-25 LAB
A/G RATIO: 1.9 (ref 1.1–2.2)
ALBUMIN SERPL-MCNC: 4.8 G/DL (ref 3.4–5)
ALP BLD-CCNC: 95 U/L (ref 40–129)
ALT SERPL-CCNC: 39 U/L (ref 10–40)
ANION GAP SERPL CALCULATED.3IONS-SCNC: 18 MMOL/L (ref 3–16)
AST SERPL-CCNC: 21 U/L (ref 15–37)
BASOPHILS ABSOLUTE: 0.1 K/UL (ref 0–0.2)
BASOPHILS RELATIVE PERCENT: 1.1 %
BILIRUB SERPL-MCNC: 0.4 MG/DL (ref 0–1)
BUN BLDV-MCNC: 31 MG/DL (ref 7–20)
CALCIUM SERPL-MCNC: 9.6 MG/DL (ref 8.3–10.6)
CHLORIDE BLD-SCNC: 100 MMOL/L (ref 99–110)
CO2: 23 MMOL/L (ref 21–32)
CREAT SERPL-MCNC: 1.9 MG/DL (ref 0.8–1.3)
EOSINOPHILS ABSOLUTE: 0.5 K/UL (ref 0–0.6)
EOSINOPHILS RELATIVE PERCENT: 6.4 %
FERRITIN: 28.2 NG/ML (ref 30–400)
GFR AFRICAN AMERICAN: 43
GFR NON-AFRICAN AMERICAN: 36
GLOBULIN: 2.5 G/DL
GLUCOSE BLD-MCNC: 121 MG/DL (ref 70–99)
HCT VFR BLD CALC: 47.3 % (ref 40.5–52.5)
HEMOGLOBIN: 15.2 G/DL (ref 13.5–17.5)
LYMPHOCYTES ABSOLUTE: 2.6 K/UL (ref 1–5.1)
LYMPHOCYTES RELATIVE PERCENT: 31.5 %
MCH RBC QN AUTO: 28.7 PG (ref 26–34)
MCHC RBC AUTO-ENTMCNC: 32.1 G/DL (ref 31–36)
MCV RBC AUTO: 89.5 FL (ref 80–100)
MONOCYTES ABSOLUTE: 0.7 K/UL (ref 0–1.3)
MONOCYTES RELATIVE PERCENT: 8.4 %
NEUTROPHILS ABSOLUTE: 4.4 K/UL (ref 1.7–7.7)
NEUTROPHILS RELATIVE PERCENT: 52.6 %
PDW BLD-RTO: 15.4 % (ref 12.4–15.4)
PLATELET # BLD: 334 K/UL (ref 135–450)
PMV BLD AUTO: 7.4 FL (ref 5–10.5)
POTASSIUM SERPL-SCNC: 4.2 MMOL/L (ref 3.5–5.1)
RBC # BLD: 5.29 M/UL (ref 4.2–5.9)
SARS-COV-2 ANTIBODY, TOTAL: NEGATIVE
SODIUM BLD-SCNC: 141 MMOL/L (ref 136–145)
T4 FREE: 1.6 NG/DL (ref 0.9–1.8)
TOTAL PROTEIN: 7.3 G/DL (ref 6.4–8.2)
TSH SERPL DL<=0.05 MIU/L-ACNC: 0.44 UIU/ML (ref 0.27–4.2)
VITAMIN B-12: 508 PG/ML (ref 211–911)
VITAMIN D 25-HYDROXY: 38.7 NG/ML
WBC # BLD: 8.4 K/UL (ref 4–11)

## 2021-08-25 PROCEDURE — 96372 THER/PROPH/DIAG INJ SC/IM: CPT | Performed by: FAMILY MEDICINE

## 2021-08-25 PROCEDURE — 36415 COLL VENOUS BLD VENIPUNCTURE: CPT | Performed by: FAMILY MEDICINE

## 2021-08-25 PROCEDURE — 3052F HG A1C>EQUAL 8.0%<EQUAL 9.0%: CPT | Performed by: FAMILY MEDICINE

## 2021-08-25 PROCEDURE — 99215 OFFICE O/P EST HI 40 MIN: CPT | Performed by: FAMILY MEDICINE

## 2021-08-25 RX ORDER — METHYLPREDNISOLONE ACETATE 80 MG/ML
80 INJECTION, SUSPENSION INTRA-ARTICULAR; INTRALESIONAL; INTRAMUSCULAR; SOFT TISSUE ONCE
Status: COMPLETED | OUTPATIENT
Start: 2021-08-25 | End: 2021-08-25

## 2021-08-25 RX ORDER — FENOFIBRATE 160 MG/1
160 TABLET ORAL DAILY
Qty: 90 TABLET | Refills: 3 | Status: SHIPPED | OUTPATIENT
Start: 2021-08-25 | End: 2022-08-10 | Stop reason: SDUPTHER

## 2021-08-25 RX ORDER — GLUCOSAMINE HCL/CHONDROITIN SU 500-400 MG
CAPSULE ORAL
Qty: 100 STRIP | Refills: 3 | Status: SHIPPED | OUTPATIENT
Start: 2021-08-25

## 2021-08-25 RX ORDER — MOMETASONE FUROATE 50 UG/1
2 SPRAY, METERED NASAL DAILY
Qty: 1 INHALER | Refills: 3 | Status: SHIPPED | OUTPATIENT
Start: 2021-08-25 | End: 2022-08-12 | Stop reason: SDUPTHER

## 2021-08-25 RX ORDER — METHYLPREDNISOLONE 4 MG/1
TABLET ORAL
Qty: 21 TABLET | Refills: 0 | Status: SHIPPED | OUTPATIENT
Start: 2021-08-25 | End: 2021-09-23 | Stop reason: ALTCHOICE

## 2021-08-25 RX ADMIN — METHYLPREDNISOLONE ACETATE 80 MG: 80 INJECTION, SUSPENSION INTRA-ARTICULAR; INTRALESIONAL; INTRAMUSCULAR; SOFT TISSUE at 14:51

## 2021-08-25 NOTE — PROGRESS NOTES
Assessment/Plan:    Ebenezer Groves was seen today for 3 month follow-up. Diagnoses and all orders for this visit:    Controlled type 2 diabetes mellitus without complication, without long-term current use of insulin (HCC)  -     blood glucose monitor strips; Test 1 time a day & as needed for symptoms of irregular blood glucose. Dispense sufficient amount for indicated testing frequency plus additional to accommodate PRN testing needs. -     metFORMIN (GLUCOPHAGE) 1000 MG tablet; Take 1 tablet by mouth 2 times daily (with meals), rf  Anticipate med adjustment in response to wt gain  -     Hemoglobin A1C  -     Comprehensive Metabolic Panel    Benign essential HTN  -     CBC Auto Differential   Controlled with losartan, metoprolol    Chronic fatigue  -     TSH without Reflex  -     T4, Free  -     CBC Auto Differential   2/2 interrupted sleep 2/2 shoulder pain    Acute pain of left shoulder  -     methylPREDNISolone acetate (DEPO-MEDROL) injection 80 mg  -     methylPREDNISolone (MEDROL, TANIA,) 4 MG tablet; Take by mouth. Take 6 tabs on day 1, 5 tabs on day 2, 4 tabs on day 3, 3 tabs on day 4, 2 tabs on day 5, 1 tab on day 6   Consider ortho or PT once on Medicare pending steroid response. Localized osteoarthritis of left knee  -     methylPREDNISolone acetate (DEPO-MEDROL) injection 80 mg  -     methylPREDNISolone (MEDROL, TANIA,) 4 MG tablet; Take by mouth. Take 6 tabs on day 1, 5 tabs on day 2, 4 tabs on day 3, 3 tabs on day 4, 2 tabs on day 5, 1 tab on day 6   Consider intraarticular injection pending response to po and IM steroids. Nasal congestion/nose drop addiction  -     methylPREDNISolone acetate (DEPO-MEDROL) injection 80 mg  -     methylPREDNISolone (MEDROL, TANIA,) 4 MG tablet; Take by mouth.  Take 6 tabs on day 1, 5 tabs on day 2, 4 tabs on day 3, 3 tabs on day 4, 2 tabs on day 5, 1 tab on day 6  -     mometasone (NASONEX) 50 MCG/ACT nasal spray; 2 sprays by Nasal route daily, new rx   Taper off decongestant    Low TSH level  -     TSH without Reflex  -     T4, Free    Low hemoglobin  -     FERRITIN  -     VITAMIN B12    Vitamin D deficiency  -     VITAMIN D 25 HYDROXY    Mixed hyperlipidemia  -     fenofibrate (TRIGLIDE) 160 MG tablet; Take 1 tablet by mouth daily, rf. Cont lipitor    Encounter for screening for COVID-19  -     Covid-19, Antibody, Total. Pt is vaccinated. It is possible antibody level does not detect vaccine induced antibody production. There are no Patient Instructions on file for this visit. Patient: Alana Delvalle is a 61 y.o.male who presents today with the following Chief Complaint(s):  Chief Complaint   Patient presents with    3 Month Follow-Up     HPI: Pt with controlled BP2, DM2, Htn, HLD, CAD (Dr Jose Billings), CKD (Dr Flannery), gerd and hypogonadism has returned from spending several mo in [place] at [facility]. Has gained 12 lb in past 5 mo. Has many yrs hx of sinus congestion and runny nose when eating. Uses Afrin or Sinex q few hrs day and noc x last few yrs. Claritin, Allegra, Atrovent NS not helpful for runny nose. Flonase not helpful for congestion. Will transition to [place] 4/2022, will see ENT then. No recent blood sugar checks. No sx/sx of high or low bs. Vision at [address] Awakens q 2 hr 2/2 chronic neck pain, back pain (to follow up with n/s soon, worries about hardware rejection in lumbar spine). Has been exhausted. Has L shoulder pain x mo's. Sleeps on L side with LUE under pillow. Sleeps on thin mattress in RV when in [place], suspects this is etiology of pain. Has decreased ROM, stabbing pain in post L should. Uses 2 ASA tid and 2 ibu qd to bid. Pt is aware of renal issues but feels pain is intolerable. L knee pain persits, getting worse over time. Steroid injections in past helpful, last 7/2019. Did not start iron as per 3/2021 lab response. Eats red meat infrequently.  Stopped vit D and B12 in 6/2021 to see what dose he needs based on today's labs.    Has had on/off vertigo since 2012. [facility], helpful. Pt is interested in covid immunity testing. Is concerned about return to [facility] in Oct, many resort residents are unvaccinated. Below is outline pt developed for todays visit:    [place] for 08/25:  a. Covid immunity test       --Script for booster needed? b. Chronic sinus congestion  c. Chronic pain causing excessive OTC pain meds usage       --L Shoulder pain; steroid shot possible?       --L Knee pain; steroid shot possible? --Lumbar, midback, R Shoulder blade pain; Prednisone possible?  d. Chronic debilitating exhaustion       --Poor sleeping due to sinus congestion & pain       --Waking every ~2 hrs; sleeping ~6~8 hrs ~9p~10a  e. Diabetes follow up  f. Meds Review? Lab Results   Component Value Date    LABA1C 8.7 08/25/2021    LABA1C 6.2 03/15/2021    LABA1C 5.9 12/21/2020     :      Current Outpatient Medications   Medication Sig Dispense Refill    blood glucose monitor strips Test 1 time a day & as needed for symptoms of irregular blood glucose. Dispense sufficient amount for indicated testing frequency plus additional to accommodate PRN testing needs. 100 strip 3    metFORMIN (GLUCOPHAGE) 1000 MG tablet Take 1 tablet by mouth 2 times daily (with meals) 180 tablet 3    fenofibrate (TRIGLIDE) 160 MG tablet Take 1 tablet by mouth daily 90 tablet 3    methylPREDNISolone (MEDROL, TANIA,) 4 MG tablet Take by mouth. Take 6 tabs on day 1, 5 tabs on day 2, 4 tabs on day 3, 3 tabs on day 4, 2 tabs on day 5, 1 tab on day 6 21 tablet 0    mometasone (NASONEX) 50 MCG/ACT nasal spray 2 sprays by Nasal route daily 1 Inhaler 3    metoprolol succinate (TOPROL XL) 25 MG extended release tablet TAKE 1 TABLET DAILY. THIS IS IN ADDITION TO 50 MG TABLET TO TOTAL 75 MG 90 tablet 4    metoprolol succinate (TOPROL XL) 50 MG extended release tablet TAKE 1 TABLET DAILY.  THIS IS IN ADDITION TO 25 MG TABLET TO TOTAL 75 MG 90 tablet 4    atorvastatin (LIPITOR) 80 MG tablet TAKE 1 TABLET NIGHTLY 90 tablet 4    losartan (COZAAR) 100 MG tablet Take 1 tablet by mouth daily 90 tablet 3    buPROPion (WELLBUTRIN XL) 150 MG extended release tablet Take 1 tablet by mouth every morning 90 tablet 3    buPROPion (WELLBUTRIN XL) 300 MG extended release tablet Take 1 tablet by mouth every morning 90 tablet 3    Aspirin (ASPIR-81 PO) Take 325 mg by mouth daily 2 325 to 3-325 mg ASA once daily      sildenafil (VIAGRA) 100 MG tablet Take 1 tablet by mouth as needed for Erectile Dysfunction 30 tablet 1    Testosterone (AXIRON) 30 MG/ACT SOLN Apply 1 pump to each underarm every morning 3 Bottle 1     Current Facility-Administered Medications   Medication Dose Route Frequency Provider Last Rate Last Admin    MethylPREDNISolone Acetate SUSP 40 mg  40 mg Injection Once Rebecca Aragon MD           Patient's past medical history,surgical history, family history, medications,  and allergies  were all reviewed and updated as appropriate today. Review of Systems  abv    Physical Exam  Constitutional:       General: He is not in acute distress. Appearance: Normal appearance. He is well-developed. He is not ill-appearing. HENT:      Head: Normocephalic and atraumatic. Right Ear: Tympanic membrane, ear canal and external ear normal.      Left Ear: Ear canal and external ear normal.      Mouth/Throat:      Pharynx: Oropharynx is clear. No oropharyngeal exudate. Eyes:      General: No scleral icterus. Right eye: No discharge. Left eye: No discharge. Extraocular Movements: Extraocular movements intact. Conjunctiva/sclera: Conjunctivae normal.   Neck:      Vascular: No carotid bruit. Comments: Neg TMG  Cardiovascular:      Rate and Rhythm: Normal rate and regular rhythm. Pulses: Normal pulses. Heart sounds: Normal heart sounds. No murmur heard.      Pulmonary:      Effort: Pulmonary effort is normal. No respiratory distress. Breath sounds: Normal breath sounds. Abdominal:      General: Bowel sounds are normal. There is no distension. Palpations: Abdomen is soft. There is no mass. Tenderness: There is no abdominal tenderness. Musculoskeletal:         General: Normal range of motion. Cervical back: Normal range of motion and neck supple. Right lower leg: No edema. Left lower leg: No edema. Comments: Neg L Can sign  L shoulder crepitus  L knee crepitus, incomplete extension (long term)     Lymphadenopathy:      Cervical: No cervical adenopathy. Skin:     General: Skin is warm and dry. Capillary Refill: Capillary refill takes less than 2 seconds. Coloration: Skin is not jaundiced or pale. Findings: No rash. Neurological:      General: No focal deficit present. Mental Status: He is alert and oriented to person, place, and time. Cranial Nerves: No cranial nerve deficit. Deep Tendon Reflexes: Reflexes are normal and symmetric. Psychiatric:         Mood and Affect: Mood normal.         Behavior: Behavior normal.         Thought Content:  Thought content normal.         Judgment: Judgment normal.           /82   Pulse 82   Temp 97.6 °F (36.4 °C) (Temporal)   Ht 5' 10.5\" (1.791 m)   Wt 268 lb (121.6 kg)   SpO2 98%   BMI 37.91 kg/m²

## 2021-08-26 LAB
ESTIMATED AVERAGE GLUCOSE: 203 MG/DL
HBA1C MFR BLD: 8.7 %

## 2021-08-27 ENCOUNTER — PATIENT MESSAGE (OUTPATIENT)
Dept: FAMILY MEDICINE CLINIC | Age: 66
End: 2021-08-27

## 2021-08-30 NOTE — TELEPHONE ENCOUNTER
From: Robert Nguyen  To: Adarsh Orellana MD  Sent: 2021 2:09 PM EDT  Subject: Visit Follow-Up Question    Thanks Alvina,    My A1C was high because I have made no effort to control my diet since Dad  22; eating with Mom. In [place], I had no refrigeration; my diet impossible, (Plus the Fan Christel was to die for!) I have been using this period to let my body abandon starvation mode. I have been eating every ~4~6 waking hours. Whatever I want. Mostly meat, veggies, wheat & potatoes. I regained 2/3rds of my lost weight, (but don't care). But I do care about blood sugar and chronic exhaustion. From Monday I will re-begin my low carb diet. I'll start with the 2 weeks of only protein and then add back in the veggies. No grain, starch, sugar. Next trip to [place] I will have a working fridge. FYI & Thank You!!   Vasiliy Adame

## 2021-09-16 ENCOUNTER — PATIENT MESSAGE (OUTPATIENT)
Dept: FAMILY MEDICINE CLINIC | Age: 66
End: 2021-09-16
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
## 2021-09-23 ENCOUNTER — OFFICE VISIT (OUTPATIENT)
Dept: FAMILY MEDICINE CLINIC | Age: 66
End: 2021-09-23
Payer: COMMERCIAL

## 2021-09-23 VITALS
HEIGHT: 71 IN | SYSTOLIC BLOOD PRESSURE: 128 MMHG | HEART RATE: 86 BPM | TEMPERATURE: 97.5 F | BODY MASS INDEX: 36.4 KG/M2 | WEIGHT: 260 LBS | OXYGEN SATURATION: 98 % | DIASTOLIC BLOOD PRESSURE: 68 MMHG

## 2021-09-23 DIAGNOSIS — S90.31XA CONTUSION OF RIGHT FOOT, INITIAL ENCOUNTER: ICD-10-CM

## 2021-09-23 DIAGNOSIS — M17.12 LOCALIZED OSTEOARTHRITIS OF LEFT KNEE: ICD-10-CM

## 2021-09-23 DIAGNOSIS — R09.81 NASAL CONGESTION: ICD-10-CM

## 2021-09-23 DIAGNOSIS — E11.649 UNCONTROLLED TYPE 2 DIABETES MELLITUS WITH HYPOGLYCEMIA WITHOUT COMA (HCC): Primary | ICD-10-CM

## 2021-09-23 LAB — HBA1C MFR BLD: 7.5 %

## 2021-09-23 PROCEDURE — 20610 DRAIN/INJ JOINT/BURSA W/O US: CPT | Performed by: FAMILY MEDICINE

## 2021-09-23 PROCEDURE — 99214 OFFICE O/P EST MOD 30 MIN: CPT | Performed by: FAMILY MEDICINE

## 2021-09-23 PROCEDURE — 83036 HEMOGLOBIN GLYCOSYLATED A1C: CPT | Performed by: FAMILY MEDICINE

## 2021-09-23 PROCEDURE — 3051F HG A1C>EQUAL 7.0%<8.0%: CPT | Performed by: FAMILY MEDICINE

## 2021-09-23 RX ORDER — METHYLPREDNISOLONE ACETATE 80 MG/ML
80 INJECTION, SUSPENSION INTRA-ARTICULAR; INTRALESIONAL; INTRAMUSCULAR; SOFT TISSUE ONCE
Status: COMPLETED | OUTPATIENT
Start: 2021-09-23 | End: 2021-09-23

## 2021-09-23 RX ADMIN — METHYLPREDNISOLONE ACETATE 80 MG: 80 INJECTION, SUSPENSION INTRA-ARTICULAR; INTRALESIONAL; INTRAMUSCULAR; SOFT TISSUE at 16:30

## 2021-09-25 ENCOUNTER — PATIENT MESSAGE (OUTPATIENT)
Dept: FAMILY MEDICINE CLINIC | Age: 66
End: 2021-09-25

## 2021-09-28 ENCOUNTER — HOSPITAL ENCOUNTER (OUTPATIENT)
Age: 66
Discharge: HOME | End: 2021-09-28
Payer: COMMERCIAL

## 2021-09-28 ENCOUNTER — OFFICE VISIT (OUTPATIENT)
Dept: CARDIOLOGY CLINIC | Age: 66
End: 2021-09-28
Payer: COMMERCIAL

## 2021-09-28 VITALS
SYSTOLIC BLOOD PRESSURE: 118 MMHG | OXYGEN SATURATION: 98 % | WEIGHT: 258 LBS | BODY MASS INDEX: 36.12 KG/M2 | HEIGHT: 71 IN | DIASTOLIC BLOOD PRESSURE: 84 MMHG | HEART RATE: 73 BPM

## 2021-09-28 DIAGNOSIS — E78.2 MIXED HYPERLIPIDEMIA: ICD-10-CM

## 2021-09-28 DIAGNOSIS — N28.9 RENAL INSUFFICIENCY: ICD-10-CM

## 2021-09-28 DIAGNOSIS — I25.10 CORONARY ARTERY DISEASE INVOLVING NATIVE CORONARY ARTERY OF NATIVE HEART WITHOUT ANGINA PECTORIS: ICD-10-CM

## 2021-09-28 DIAGNOSIS — I25.10 CORONARY ARTERY DISEASE INVOLVING NATIVE CORONARY ARTERY OF NATIVE HEART WITHOUT ANGINA PECTORIS: Primary | ICD-10-CM

## 2021-09-28 DIAGNOSIS — I25.5 ISCHEMIC CARDIOMYOPATHY: ICD-10-CM

## 2021-09-28 LAB
ALT SERPL-CCNC: 51 U/L (ref 10–40)
ANION GAP SERPL CALCULATED.3IONS-SCNC: 15 MMOL/L (ref 3–16)
AST SERPL-CCNC: 36 U/L (ref 15–37)
BUN BLDV-MCNC: 30 MG/DL (ref 7–20)
CALCIUM SERPL-MCNC: 10 MG/DL (ref 8.3–10.6)
CHLORIDE BLD-SCNC: 103 MMOL/L (ref 99–110)
CHOLESTEROL, FASTING: 164 MG/DL (ref 0–199)
CO2: 21 MMOL/L (ref 21–32)
CREAT SERPL-MCNC: 1.7 MG/DL (ref 0.8–1.3)
GFR AFRICAN AMERICAN: 49
GFR NON-AFRICAN AMERICAN: 41
GLUCOSE BLD-MCNC: 102 MG/DL (ref 70–99)
HDLC SERPL-MCNC: 49 MG/DL (ref 40–60)
LDL CHOLESTEROL CALCULATED: 76 MG/DL
POTASSIUM SERPL-SCNC: 5.3 MMOL/L (ref 3.5–5.1)
SODIUM BLD-SCNC: 139 MMOL/L (ref 136–145)
TRIGLYCERIDE, FASTING: 193 MG/DL (ref 0–150)
VLDLC SERPL CALC-MCNC: 39 MG/DL

## 2021-09-28 PROCEDURE — 84460 ALANINE AMINO (ALT) (SGPT): CPT

## 2021-09-28 PROCEDURE — 80048 BASIC METABOLIC PNL TOTAL CA: CPT

## 2021-09-28 PROCEDURE — 80061 LIPID PANEL: CPT

## 2021-09-28 PROCEDURE — 36415 COLL VENOUS BLD VENIPUNCTURE: CPT

## 2021-09-28 PROCEDURE — 99214 OFFICE O/P EST MOD 30 MIN: CPT | Performed by: INTERNAL MEDICINE

## 2021-09-28 PROCEDURE — 84450 TRANSFERASE (AST) (SGOT): CPT

## 2021-09-28 PROCEDURE — 93000 ELECTROCARDIOGRAM COMPLETE: CPT | Performed by: INTERNAL MEDICINE

## 2021-09-28 NOTE — PROGRESS NOTES
Allergies:  Nsaids, Abilify [aripiprazole], and Oxycontin [oxycodone hcl]     Home Medications:  Prior to Visit Medications    Medication Sig Taking? Authorizing Provider   blood glucose monitor strips Test 1 time a day & as needed for symptoms of irregular blood glucose. Dispense sufficient amount for indicated testing frequency plus additional to accommodate PRN testing needs. Yes Fern Rodarte MD   metFORMIN (GLUCOPHAGE) 1000 MG tablet Take 1 tablet by mouth 2 times daily (with meals) Yes Alvina Osborn MD   fenofibrate (TRIGLIDE) 160 MG tablet Take 1 tablet by mouth daily Yes Alvina Osborn MD   mometasone (NASONEX) 50 MCG/ACT nasal spray 2 sprays by Nasal route daily Yes Fern Rodarte MD   metoprolol succinate (TOPROL XL) 25 MG extended release tablet TAKE 1 TABLET DAILY. THIS IS IN ADDITION TO 50 MG TABLET TO TOTAL 75 MG Yes Cassandra Porter MD   metoprolol succinate (TOPROL XL) 50 MG extended release tablet TAKE 1 TABLET DAILY. THIS IS IN ADDITION TO 25 MG TABLET TO TOTAL 75 MG Yes Cassandra Porter MD   atorvastatin (LIPITOR) 80 MG tablet TAKE 1 TABLET NIGHTLY Yes Cassandra Porter MD   losartan (COZAAR) 100 MG tablet Take 1 tablet by mouth daily Yes Fern Rodarte MD   buPROPion (WELLBUTRIN XL) 150 MG extended release tablet Take 1 tablet by mouth every morning Yes Fern Rodarte MD   buPROPion (WELLBUTRIN XL) 300 MG extended release tablet Take 1 tablet by mouth every morning Yes Fern Rodarte MD   Aspirin (ASPIR-81 PO) Take 325 mg by mouth daily 2 325  ASA once daily Yes Asif Gregg MD   sildenafil (VIAGRA) 100 MG tablet Take 1 tablet by mouth as needed for Erectile Dysfunction Yes Fern Rodarte MD       [x] Medications and dosages reviewed.     ROS:  [x]Full ROS obtained and negative except as mentioned in HPI      Physical Examination:    Vitals:    09/28/21 1301   BP: 118/84   Pulse: 73   SpO2: 98%      /84 (Site: Right Upper Arm, Position: Sitting, Cuff Size: Medium Adult) Pulse 73   Ht 5' 10.5\" (1.791 m)   Wt 258 lb (117 kg)   SpO2 98%   BMI 36.50 kg/m²     · GENERAL: Well developed, well nourished, No acute distress  · NEUROLOGICAL: Alert and oriented  · PSYCH: Calm affect  · SKIN: Warm and dry, no rash  · HEENT: Normocephalic, Sclera non-icteric, mucus membranes moist  · NECK: supple, JVP normal  · CAROTID: Normal upstroke, no bruits  · CARDIAC: Normal PMI, Regular rate and rhythm, normal S1S2, no murmur, rub, or gallop  · RESPIRATORY: Normal respiratory effort, clear to auscultation bilaterally  · EXTREMITIES: No LE edema  · MUSCULOSKELETAL: No joint swelling or tenderness, no chest wall tenderness  · GASTROINTESTINAL: normal bowel sounds, soft, non-tender, no bruit      ECHO  4/2018   -Normal left ventricle size.   -Left ventricular systolic dysfunction with an estimated ejection fraction   of 40%. There is akinesis of the distal septum and apex.   -There is mild concentric left ventricular hypertrophy.   -Trivial tricuspid regurgitation. Myoview 5/2018  Summary    Large sized anteroapical minimally reversible defect of moderate intensity    consistent with infarction in the territory of the proximal LAD .    Small sized inferior fixed defect of moderate intensity consistent with    infarction in the territory of the proximal RCA .    Enlarged LV with EF 37 % and anteroseptal akinesis and inferior akinesis         Assessment:     CAD  Prior infarct with mild-moderate LV dysfunction. Stable without angina. Continue medical therapy    CATH 5/2018  LM: Normal  LAD: Prox and mid 30%. Mid-distal 95% eccentric  LCX: large, dominant, Mild disease  RCA: small non-dominant  LV: apical hypokinesis. EF=45-50%     IMP: severe mid-distal LAD stenosis with corresponding wall motion abnormalitiy. Suspect myocardium distal to stenosis id non-viable. Ischemic cardiomyopathy:  All testing c/w prior infarct. No ischemia on myoview and no angina.   Remains class 1  Continue toprol to 75 and Cozaar 100 qday      Hyperlipidemia:  Continue lipitor  Check lipids/LFTs    Renal Insufficiency:  Creat up to 1.9  Recheck today  He does not want to see nephrology back as he says he \"Knows it is from my ibuprofen\"    AODM:  [id number]=2.5.   Some improvement with better diet    Plan:  Stable cardiac status  Labs today  F/u 6 months    Thank you for allowing me to participate in the care of this individual.      Susi Martines M.D., [address] [facility]

## 2021-09-29 ENCOUNTER — TELEPHONE (OUTPATIENT)
Dept: CARDIOLOGY CLINIC | Age: 66
End: 2021-09-29

## 2021-09-29 DIAGNOSIS — R79.89 ELEVATED LFTS: ICD-10-CM

## 2021-09-29 DIAGNOSIS — N28.9 RENAL INSUFFICIENCY: Primary | ICD-10-CM

## 2021-09-29 RX ORDER — GABAPENTIN 300 MG/1
CAPSULE ORAL
Qty: 90 CAPSULE | Refills: 1 | Status: SHIPPED | OUTPATIENT
Start: 2021-09-29 | End: 2021-12-13

## 2021-09-29 NOTE — TELEPHONE ENCOUNTER
Called patient and relayed message below. Patient states he doesn't do alcohol or tylenol,maybe Advil which he's trying to limit. Patient also states he has been on a all carb meat diet. Please advise?

## 2021-09-29 NOTE — TELEPHONE ENCOUNTER
----- Message from Dale Schlatter, MD sent at 9/29/2021  3:40 PM EDT -----  Kidney function stable but K+ high. Limit potassium intake. Recheck 2-3 weeks. Also LFTs slightly elevated. Limit alcohol and tylenol. Recheck 3 months.     1 [facility]

## 2021-09-29 NOTE — TELEPHONE ENCOUNTER
----- Message from Anshu Franklin MD sent at 9/29/2021  3:40 PM EDT -----  Kidney function stable but K+ high. Limit potassium intake. Recheck 2-3 weeks. Also LFTs slightly elevated. Limit alcohol and tylenol. Recheck 3 months.     1 [facility]

## 2021-10-24 ENCOUNTER — PATIENT MESSAGE (OUTPATIENT)
Dept: FAMILY MEDICINE CLINIC | Age: 66
End: 2021-10-24

## 2021-10-25 NOTE — TELEPHONE ENCOUNTER
From: [facility]  To: Celinda Krabbe, MD  Sent: 10/24/2021 3:50 PM EDT  Subject: Visit Follow-Up Question    Dear Shawn Vivar told me to report how well the Gabapentin helps my pain. I determined 300mg 2X per day doesn't help. However, 3X helps from (10-scale) ~6~7 down to ~5~4, averaging every spot together. Sometimes I have been able to bend down and pick something off of the floor. I couldn't do that for years. But my ankles, neck, left leg, right knee, and lumbar still ache significantly and daily, (ankles ~4~7, neck ~7~10, left leg ~3~2, right knee ~2~1, lumbar ~6~8). My left shoulder has improved down to only frequent, momentary twinges. The cortisone shot in my left knee worked. I'm off to [facility], for a month. I look forward to seeing you in December. Happy Thanksgiving! Thank [email or web address] [email or web address]  [phone]

## 2021-12-13 ENCOUNTER — OFFICE VISIT (OUTPATIENT)
Dept: FAMILY MEDICINE CLINIC | Age: 66
End: 2021-12-13
Payer: COMMERCIAL

## 2021-12-13 VITALS
WEIGHT: 265 LBS | HEART RATE: 72 BPM | HEIGHT: 71 IN | SYSTOLIC BLOOD PRESSURE: 130 MMHG | DIASTOLIC BLOOD PRESSURE: 84 MMHG | BODY MASS INDEX: 37.1 KG/M2 | OXYGEN SATURATION: 97 %

## 2021-12-13 DIAGNOSIS — F31.81 BIPOLAR 2 DISORDER (HCC): ICD-10-CM

## 2021-12-13 DIAGNOSIS — F19.20: ICD-10-CM

## 2021-12-13 DIAGNOSIS — I10 BENIGN ESSENTIAL HTN: ICD-10-CM

## 2021-12-13 DIAGNOSIS — E11.649 UNCONTROLLED TYPE 2 DIABETES MELLITUS WITH HYPOGLYCEMIA WITHOUT COMA (HCC): Primary | ICD-10-CM

## 2021-12-13 DIAGNOSIS — R79.89 ELEVATED LFTS: ICD-10-CM

## 2021-12-13 DIAGNOSIS — R52 PAIN OF MULTIPLE SITES: ICD-10-CM

## 2021-12-13 DIAGNOSIS — N18.30 STAGE 3 CHRONIC KIDNEY DISEASE, UNSPECIFIED WHETHER STAGE 3A OR 3B CKD (HCC): ICD-10-CM

## 2021-12-13 PROCEDURE — 36415 COLL VENOUS BLD VENIPUNCTURE: CPT | Performed by: FAMILY MEDICINE

## 2021-12-13 PROCEDURE — 99214 OFFICE O/P EST MOD 30 MIN: CPT | Performed by: FAMILY MEDICINE

## 2021-12-13 RX ORDER — GABAPENTIN 600 MG/1
600 TABLET ORAL 2 TIMES DAILY
Qty: 180 TABLET | Refills: 1 | Status: SHIPPED | OUTPATIENT
Start: 2021-12-13 | End: 2022-07-21 | Stop reason: ALTCHOICE
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
## 2021-12-14 ENCOUNTER — TELEPHONE (OUTPATIENT)
Dept: CARDIOLOGY CLINIC | Age: 66
End: 2021-12-14

## 2021-12-14 DIAGNOSIS — E87.5 HYPERKALEMIA: Primary | ICD-10-CM

## 2021-12-14 LAB
ANION GAP SERPL CALCULATED.3IONS-SCNC: 13 MMOL/L (ref 3–16)
BUN BLDV-MCNC: 21 MG/DL (ref 7–20)
CALCIUM SERPL-MCNC: 9.5 MG/DL (ref 8.3–10.6)
CHLORIDE BLD-SCNC: 103 MMOL/L (ref 99–110)
CO2: 24 MMOL/L (ref 21–32)
CREAT SERPL-MCNC: 1.6 MG/DL (ref 0.8–1.3)
ESTIMATED AVERAGE GLUCOSE: 148.5 MG/DL
GFR AFRICAN AMERICAN: 53
GFR NON-AFRICAN AMERICAN: 44
GLUCOSE BLD-MCNC: 145 MG/DL (ref 70–99)
HBA1C MFR BLD: 6.8 %
POTASSIUM SERPL-SCNC: 5.6 MMOL/L (ref 3.5–5.1)
SODIUM BLD-SCNC: 140 MMOL/L (ref 136–145)

## 2021-12-14 NOTE — TELEPHONE ENCOUNTER
MMK reviewed results. Potassium worse at 5. 6! Needs to stop Losartan and stop any extra potassium. Limit intake of high potassium foods/drinks. Needs to see nephrologist back. Kidney function still decreased. Cr 1.6. Recheck labs in 2 weeks. PCP has reviewed labs also. LMOM for patient to call office back to discuss instructions.

## 2021-12-14 NOTE — TELEPHONE ENCOUNTER
Discussed with patient. He will stop Losartan. He will watch intake of higher potassium foods/drinks. Reviewed them with patient and mailed list of low/high potassium foods. He will have labs rechecked in 2 weeks at PCP office. He wants to wait until April when his insurance changes to find a new nephrologist. He can't afford it at the moment due to his current insurance.

## 2021-12-17 ENCOUNTER — PATIENT MESSAGE (OUTPATIENT)
Dept: FAMILY MEDICINE CLINIC | Age: 66
End: 2021-12-17

## 2022-04-07 DIAGNOSIS — F31.81 BIPOLAR 2 DISORDER (HCC): ICD-10-CM

## 2022-04-07 RX ORDER — BUPROPION HYDROCHLORIDE 300 MG/1
TABLET ORAL
Qty: 90 TABLET | Refills: 3 | Status: SHIPPED | OUTPATIENT
Start: 2022-04-07 | End: 2022-08-10 | Stop reason: SDUPTHER

## 2022-04-07 RX ORDER — BUPROPION HYDROCHLORIDE 150 MG/1
TABLET ORAL
Qty: 90 TABLET | Refills: 3 | Status: SHIPPED | OUTPATIENT
Start: 2022-04-07 | End: 2022-08-10 | Stop reason: SDUPTHER

## 2022-04-25 PROBLEM — N28.9 RENAL INSUFFICIENCY: Status: ACTIVE | Noted: 2022-04-25

## 2022-06-13 ENCOUNTER — TELEPHONE (OUTPATIENT)
Dept: CARDIOLOGY CLINIC | Age: 67
End: 2022-06-13

## 2022-06-13 DIAGNOSIS — I25.5 ISCHEMIC CARDIOMYOPATHY: ICD-10-CM

## 2022-06-13 DIAGNOSIS — I25.10 CORONARY ARTERY DISEASE INVOLVING NATIVE CORONARY ARTERY OF NATIVE HEART WITHOUT ANGINA PECTORIS: ICD-10-CM

## 2022-06-13 RX ORDER — ATORVASTATIN CALCIUM 80 MG/1
TABLET, FILM COATED ORAL
Qty: 90 TABLET | Refills: 3 | OUTPATIENT
Start: 2022-06-13

## 2022-06-13 RX ORDER — METOPROLOL SUCCINATE 50 MG/1
TABLET, EXTENDED RELEASE ORAL
Qty: 90 TABLET | Refills: 3 | OUTPATIENT
Start: 2022-06-13

## 2022-06-13 RX ORDER — METOPROLOL SUCCINATE 25 MG/1
TABLET, EXTENDED RELEASE ORAL
Qty: 90 TABLET | Refills: 3 | OUTPATIENT
Start: 2022-06-13

## 2022-06-13 NOTE — TELEPHONE ENCOUNTER
I called Mr Travis Steen regarding refills to let him know he is overdue to see MMK. He states he is working on getting insurance currently thru [employer] and as soon as this is resolved he will make an appt. He also states he does not need refills at this time.  Just an [place]

## 2022-06-13 NOTE — TELEPHONE ENCOUNTER
Received refill request for Metoprolol succinate and  Atorvastatin from [facility].     Last ov:2021 MMK    Last labs:2021 Lipid, AST & ALT    Last EK2021     Last Refill:2021 # 90 w/ 4     Next appointment:None

## 2022-07-01 DIAGNOSIS — K21.9 GASTROESOPHAGEAL REFLUX DISEASE WITHOUT ESOPHAGITIS: ICD-10-CM

## 2022-07-01 DIAGNOSIS — I10 BENIGN ESSENTIAL HTN: ICD-10-CM

## 2022-07-01 RX ORDER — FAMOTIDINE 20 MG/1
TABLET, FILM COATED ORAL
Qty: 180 TABLET | Refills: 3 | OUTPATIENT
Start: 2022-07-01

## 2022-07-01 RX ORDER — LOSARTAN POTASSIUM 100 MG/1
TABLET ORAL
Qty: 90 TABLET | Refills: 3 | OUTPATIENT
Start: 2022-07-01

## 2022-07-01 NOTE — TELEPHONE ENCOUNTER
Pt contacted and he stated that they are not to be filled due to insurance changes and will discuss with RS at his future appt.

## 2022-07-21 ENCOUNTER — OFFICE VISIT (OUTPATIENT)
Dept: FAMILY MEDICINE CLINIC | Age: 67
End: 2022-07-21
Payer: MEDICARE

## 2022-07-21 ENCOUNTER — TELEPHONE (OUTPATIENT)
Dept: FAMILY MEDICINE CLINIC | Age: 67
End: 2022-07-21

## 2022-07-21 VITALS
OXYGEN SATURATION: 97 % | WEIGHT: 264 LBS | HEART RATE: 80 BPM | SYSTOLIC BLOOD PRESSURE: 138 MMHG | BODY MASS INDEX: 36.96 KG/M2 | HEIGHT: 71 IN | DIASTOLIC BLOOD PRESSURE: 86 MMHG

## 2022-07-21 DIAGNOSIS — Z23 NEED FOR VACCINATION FOR PNEUMOCOCCUS: ICD-10-CM

## 2022-07-21 DIAGNOSIS — N18.31 STAGE 3A CHRONIC KIDNEY DISEASE (HCC): ICD-10-CM

## 2022-07-21 DIAGNOSIS — E11.65 CONTROLLED TYPE 2 DIABETES MELLITUS WITH HYPERGLYCEMIA, WITHOUT LONG-TERM CURRENT USE OF INSULIN (HCC): ICD-10-CM

## 2022-07-21 DIAGNOSIS — M54.9 MUSCULOSKELETAL BACK PAIN: ICD-10-CM

## 2022-07-21 DIAGNOSIS — I10 BENIGN ESSENTIAL HTN: ICD-10-CM

## 2022-07-21 DIAGNOSIS — F31.81 BIPOLAR 2 DISORDER (HCC): ICD-10-CM

## 2022-07-21 DIAGNOSIS — E78.2 MIXED HYPERLIPIDEMIA: ICD-10-CM

## 2022-07-21 DIAGNOSIS — E66.01 SEVERE OBESITY (BMI 35.0-39.9) WITH COMORBIDITY (HCC): ICD-10-CM

## 2022-07-21 DIAGNOSIS — I50.22 CHRONIC SYSTOLIC (CONGESTIVE) HEART FAILURE (HCC): ICD-10-CM

## 2022-07-21 DIAGNOSIS — E11.65 CONTROLLED TYPE 2 DIABETES MELLITUS WITH HYPERGLYCEMIA, WITHOUT LONG-TERM CURRENT USE OF INSULIN (HCC): Primary | ICD-10-CM

## 2022-07-21 PROCEDURE — 1123F ACP DISCUSS/DSCN MKR DOCD: CPT | Performed by: FAMILY MEDICINE

## 2022-07-21 PROCEDURE — 3044F HG A1C LEVEL LT 7.0%: CPT | Performed by: FAMILY MEDICINE

## 2022-07-21 PROCEDURE — 3017F COLORECTAL CA SCREEN DOC REV: CPT | Performed by: FAMILY MEDICINE

## 2022-07-21 PROCEDURE — G8417 CALC BMI ABV UP PARAM F/U: HCPCS | Performed by: FAMILY MEDICINE

## 2022-07-21 PROCEDURE — 1036F TOBACCO NON-USER: CPT | Performed by: FAMILY MEDICINE

## 2022-07-21 PROCEDURE — 99215 OFFICE O/P EST HI 40 MIN: CPT | Performed by: FAMILY MEDICINE

## 2022-07-21 PROCEDURE — 2022F DILAT RTA XM EVC RTNOPTHY: CPT | Performed by: FAMILY MEDICINE

## 2022-07-21 PROCEDURE — G8427 DOCREV CUR MEDS BY ELIG CLIN: HCPCS | Performed by: FAMILY MEDICINE

## 2022-07-21 RX ORDER — PREDNISONE 10 MG/1
TABLET ORAL
Qty: 10 TABLET | Refills: 0 | Status: SHIPPED | OUTPATIENT
Start: 2022-07-21 | End: 2022-07-21 | Stop reason: SDUPTHER

## 2022-07-21 RX ORDER — CYCLOBENZAPRINE HCL 10 MG
10 TABLET ORAL 3 TIMES DAILY PRN
Qty: 90 TABLET | Refills: 1 | Status: SHIPPED | OUTPATIENT
Start: 2022-07-21 | End: 2022-09-12

## 2022-07-21 RX ORDER — PREGABALIN 100 MG/1
100 CAPSULE ORAL 2 TIMES DAILY
Qty: 60 CAPSULE | Refills: 5 | Status: SHIPPED | OUTPATIENT
Start: 2022-07-21 | End: 2022-09-12 | Stop reason: SDUPTHER

## 2022-07-21 RX ORDER — PREDNISONE 10 MG/1
TABLET ORAL
Qty: 130 TABLET | Refills: 0 | Status: SHIPPED | OUTPATIENT
Start: 2022-07-21 | End: 2022-08-20

## 2022-07-21 ASSESSMENT — PATIENT HEALTH QUESTIONNAIRE - PHQ9
7. TROUBLE CONCENTRATING ON THINGS, SUCH AS READING THE NEWSPAPER OR WATCHING TELEVISION: 0
6. FEELING BAD ABOUT YOURSELF - OR THAT YOU ARE A FAILURE OR HAVE LET YOURSELF OR YOUR FAMILY DOWN: 0
2. FEELING DOWN, DEPRESSED OR HOPELESS: 0
9. THOUGHTS THAT YOU WOULD BE BETTER OFF DEAD, OR OF HURTING YOURSELF: 0
SUM OF ALL RESPONSES TO PHQ9 QUESTIONS 1 & 2: 0
8. MOVING OR SPEAKING SO SLOWLY THAT OTHER PEOPLE COULD HAVE NOTICED. OR THE OPPOSITE, BEING SO FIGETY OR RESTLESS THAT YOU HAVE BEEN MOVING AROUND A LOT MORE THAN USUAL: 0
1. LITTLE INTEREST OR PLEASURE IN DOING THINGS: 0
4. FEELING TIRED OR HAVING LITTLE ENERGY: 0
SUM OF ALL RESPONSES TO PHQ QUESTIONS 1-9: 0
SUM OF ALL RESPONSES TO PHQ QUESTIONS 1-9: 0
3. TROUBLE FALLING OR STAYING ASLEEP: 0
5. POOR APPETITE OR OVEREATING: 0
10. IF YOU CHECKED OFF ANY PROBLEMS, HOW DIFFICULT HAVE THESE PROBLEMS MADE IT FOR YOU TO DO YOUR WORK, TAKE CARE OF THINGS AT HOME, OR GET ALONG WITH OTHER PEOPLE: 0
SUM OF ALL RESPONSES TO PHQ QUESTIONS 1-9: 0
SUM OF ALL RESPONSES TO PHQ QUESTIONS 1-9: 0

## 2022-07-21 NOTE — PATIENT INSTRUCTIONS
Please send me message about your back pain as you near the end of the prednisone taper. Please try to decrease ibuprofen use.

## 2022-07-21 NOTE — TELEPHONE ENCOUNTER
[address] Needs Clarification about medication dosage and directions.   predniSONE (DELTASONE) 10 MG tablet   Dispense Quantity: 10 tablet Refills: 0          Si po qd x 1wk, 5 po qd x 1wk, 4 po qd x 1wk, 3 po qd 130   [facility] [address], [address] [place] [address] [phone]

## 2022-07-21 NOTE — PROGRESS NOTES
Assessment/Plan:    Anna Prieto was seen today for medication refill and pain. Diagnoses and all orders for this visit:    Controlled type 2 diabetes mellitus with hyperglycemia, without long-term current use of insulin (HCC)  -     Hemoglobin A1C; Future  -     Microalbumin / Creatinine Urine Ratio; Future   Wt has increased with inactivity 2/2 back pain. Pt is slowly improving and will increase activity as vamsi'd. Musculoskeletal back pain  -    predniSONE (DELTASONE) 10 MG tablet; 6 po qd x 1wk, 5 po qd x 1wk, 4 po qd x 1wk, 3 po qd, to replace ibu. To further taper in 1 mo. -     pregabalin (LYRICA) 100 MG capsule; Take 1 capsule by mouth in the morning and 1 capsule before bedtime. Do all this for 30 days, new rx  -     cyclobenzaprine (FLEXERIL) 10 MG tablet; Take 1 tablet by mouth 3 times daily as needed for Muscle spasms, rf    Benign essential HTN  -     Comprehensive Metabolic Panel; Future   Controlled per weekly bp checks by [address] [place] [address] Avenue. Need for vaccination for pneumococcus  -     Pneumococcal, PCV20, PREVNAR 21, (age 25 yrs+), IM, PF    Mixed hyperlipidemia  -     Cancel: Lipid Panel  -     Lipid, Fasting; Future    Stage 3a chronic kidney disease ([facility]   Pt is to avoid nsaids in lieu of prednisone. Pt understands risk of nsaids but has felt pain has been intolerable. Severe obesity (BMI 35.0-39. 9) with comorbidity ([address]   Wt loss encouraged    Bipolar 2 disorder ([facility]   Pt conts on wellbutrin w/o mood stabilizer. At this time, hypomania is mild, near baseline. Chronic systolic (congestive) heart failure   Per Dr Angelia Atwood.    Pt has d/c'd lipitor though LDL remains at goal. HDL is low with back pain, inactivity. To discuss resuming med as back pain dissipates. 40 min visit to review multiple issues as above. Patient Instructions   Please send me message about your back pain as you near the end of the prednisone taper. Please try to decrease ibuprofen use.         Patient: Anna Prieto Donnie Laughlin is a 74 y. o.male who presents today with the following Chief Complaint(s):  Chief Complaint   Patient presents with    Medication Refill    Pain     All over body          HPI: Pt with controlled BP2, DM2, Htn, HLD, CAD and ischemic cmp (Dr Joshua Reno), CKD (Dr Mary Albarran in past), gerd and hypogonadism picked up 13 lb item 12/2021 and had intense low back pain L>R. Washing dishes, bending forward, etc very painful. Was unable to drive to [facility] for winter 2/2 back pain. Ice, voltaren gel, salonpas, back brace helpful temporarily. Pt has resumed ibu 200 2 po qhs about 4 x per wk despite cri. Pt feels he cannot  function w/o nsaids 2/2 pain. Pt d/c'd judie 12/2021 as pt realized it was not effective. Has gained wt with inactivity. Skelaxin, parafon forte not helpful. Knees, ankles, neck ache. Pt d/c'd lipitor 12/2022 as he feels it exacerbated ongoing joint pain related to flat feet. Pt felt Dr Mendoza Nephhenna told pt to d/c rx. 32 yr old nephew with mental health issues has moved in with pt. Pt drove to [facility] to pick him up from hosp stay for SI. Pt feels his presence is positive despite nephew's anger issues. To return to [facility] Sept 2022. [facility]. BP remains nl. No recent bs checks, no sx's of high or low bs. Saw Dr Valerie Doyle in [place], [place]. To have testosterone labs soon. Lab Results   Component Value Date    LABA1C 6.9 07/21/2022    LABA1C 6.8 12/13/2021    LABA1C 7.5 09/23/2021     :        Current Outpatient Medications   Medication Sig Dispense Refill    pregabalin (LYRICA) 100 MG capsule Take 1 capsule by mouth in the morning and 1 capsule before bedtime. Do all this for 30 days.  60 capsule 5    cyclobenzaprine (FLEXERIL) 10 MG tablet Take 1 tablet by mouth 3 times daily as needed for Muscle spasms 90 tablet 1    buPROPion (WELLBUTRIN XL) 300 MG extended release tablet TAKE 1 TABLET EVERY MORNING 90 tablet 3    buPROPion (WELLBUTRIN XL) 150 MG extended release tablet TAKE 1 TABLET EVERY MORNING 90 tablet 3    blood glucose monitor strips Test 1 time a day & as needed for symptoms of irregular blood glucose. Dispense sufficient amount for indicated testing frequency plus additional to accommodate PRN testing needs. 100 strip 3    metFORMIN (GLUCOPHAGE) 1000 MG tablet Take 1 tablet by mouth 2 times daily (with meals) 180 tablet 3    fenofibrate (TRIGLIDE) 160 MG tablet Take 1 tablet by mouth daily 90 tablet 3    mometasone (NASONEX) 50 MCG/ACT nasal spray 2 sprays by Nasal route daily 1 Inhaler 3    metoprolol succinate (TOPROL XL) 25 MG extended release tablet TAKE 1 TABLET DAILY. THIS IS IN ADDITION TO 50 MG TABLET TO TOTAL 75 MG 90 tablet 4    metoprolol succinate (TOPROL XL) 50 MG extended release tablet TAKE 1 TABLET DAILY. THIS IS IN ADDITION TO 25 MG TABLET TO TOTAL 75 MG 90 tablet 4    Aspirin (ASPIR-81 PO) Take 325 mg by mouth 2 times daily       sildenafil (VIAGRA) 100 MG tablet Take 1 tablet by mouth as needed for Erectile Dysfunction 30 tablet 1    predniSONE (DELTASONE) 10 MG tablet 6 po qd x 1wk, 5 po qd x 1wk, 4 po qd x 1wk, 3 po qd 130 130 tablet 0    atorvastatin (LIPITOR) 80 MG tablet TAKE 1 TABLET NIGHTLY (Patient not taking: Reported on 7/21/2022) 90 tablet 4     Current Facility-Administered Medications   Medication Dose Route Frequency Provider Last Rate Last Admin    MethylPREDNISolone Acetate SUSP 40 mg  40 mg Injection Once Zora Acosta MD           Patient's past medical history,surgical history, family history, medications,  and allergies  were all reviewed and updated as appropriate today. Review of Systems  abv    Physical Exam  Constitutional:       General: He is not in acute distress. Appearance: Normal appearance. He is well-developed. He is not ill-appearing. HENT:      Head: Normocephalic and atraumatic.       Right Ear: Tympanic membrane, ear canal and external ear normal.      Left Ear: Tympanic membrane, ear canal and external ear normal. Mouth/Throat:      Pharynx: Oropharynx is clear. No oropharyngeal exudate. Eyes:      General: No scleral icterus. Right eye: No discharge. Left eye: No discharge. Extraocular Movements: Extraocular movements intact. Conjunctiva/sclera: Conjunctivae normal.   Neck:      Vascular: No carotid bruit. Comments: Neg TMG  Cardiovascular:      Rate and Rhythm: Normal rate and regular rhythm. Pulses: Normal pulses. Heart sounds: Normal heart sounds. No murmur heard. Pulmonary:      Effort: Pulmonary effort is normal. No respiratory distress. Breath sounds: Normal breath sounds. Abdominal:      General: Bowel sounds are normal. There is no distension. Palpations: Abdomen is soft. There is no mass. Tenderness: There is no abdominal tenderness. Musculoskeletal:         General: Normal range of motion. Cervical back: Normal range of motion and neck supple. Right lower leg: No edema. Left lower leg: No edema. Lymphadenopathy:      Cervical: No cervical adenopathy. Skin:     General: Skin is warm and dry. Capillary Refill: Capillary refill takes less than 2 seconds. Coloration: Skin is not jaundiced or pale. Findings: No rash. Neurological:      General: No focal deficit present. Mental Status: He is alert and oriented to person, place, and time. Cranial Nerves: No cranial nerve deficit. Deep Tendon Reflexes: Reflexes are normal and symmetric. Psychiatric:         Behavior: Behavior normal.         Thought Content:  Thought content normal.         Judgment: Judgment normal.      Comments: Slightly pressured speech         /86   Pulse 80   Ht 5' 10.5\" (1.791 m)   Wt 264 lb (119.7 kg)   SpO2 97%   BMI 37.34 kg/m²

## 2022-07-22 PROBLEM — F11.93 WITHDRAWAL FROM OPIOIDS (HCC): Status: RESOLVED | Noted: 2018-12-19 | Resolved: 2022-07-22

## 2022-07-22 PROBLEM — I50.22 CHRONIC SYSTOLIC (CONGESTIVE) HEART FAILURE (HCC): Status: ACTIVE | Noted: 2022-07-22

## 2022-07-22 LAB
A/G RATIO: 2.1 (ref 1.1–2.2)
ALBUMIN SERPL-MCNC: 4.5 G/DL (ref 3.4–5)
ALP BLD-CCNC: 80 U/L (ref 40–129)
ALT SERPL-CCNC: 45 U/L (ref 10–40)
ANION GAP SERPL CALCULATED.3IONS-SCNC: 14 MMOL/L (ref 3–16)
AST SERPL-CCNC: 32 U/L (ref 15–37)
BILIRUB SERPL-MCNC: 0.4 MG/DL (ref 0–1)
BUN BLDV-MCNC: 13 MG/DL (ref 7–20)
CALCIUM SERPL-MCNC: 10.8 MG/DL (ref 8.3–10.6)
CHLORIDE BLD-SCNC: 105 MMOL/L (ref 99–110)
CHOLESTEROL, FASTING: 112 MG/DL (ref 0–199)
CO2: 24 MMOL/L (ref 21–32)
CREAT SERPL-MCNC: 1.7 MG/DL (ref 0.8–1.3)
CREATININE URINE: 148.2 MG/DL (ref 39–259)
ESTIMATED AVERAGE GLUCOSE: 151.3 MG/DL
GFR AFRICAN AMERICAN: 49
GFR NON-AFRICAN AMERICAN: 41
GLUCOSE BLD-MCNC: 106 MG/DL (ref 70–99)
HBA1C MFR BLD: 6.9 %
HDLC SERPL-MCNC: 37 MG/DL (ref 40–60)
LDL CHOLESTEROL CALCULATED: 46 MG/DL
MICROALBUMIN UR-MCNC: 3 MG/DL
MICROALBUMIN/CREAT UR-RTO: 20.2 MG/G (ref 0–30)
POTASSIUM SERPL-SCNC: 4.9 MMOL/L (ref 3.5–5.1)
SODIUM BLD-SCNC: 143 MMOL/L (ref 136–145)
TOTAL PROTEIN: 6.6 G/DL (ref 6.4–8.2)
TRIGLYCERIDE, FASTING: 147 MG/DL (ref 0–150)
VLDLC SERPL CALC-MCNC: 29 MG/DL

## 2022-07-22 PROCEDURE — 90677 PCV20 VACCINE IM: CPT | Performed by: FAMILY MEDICINE

## 2022-07-31 ENCOUNTER — PATIENT MESSAGE (OUTPATIENT)
Dept: FAMILY MEDICINE CLINIC | Age: 67
End: 2022-07-31

## 2022-07-31 DIAGNOSIS — I25.5 ISCHEMIC CARDIOMYOPATHY: ICD-10-CM

## 2022-07-31 DIAGNOSIS — I25.10 CORONARY ARTERY DISEASE INVOLVING NATIVE CORONARY ARTERY OF NATIVE HEART WITHOUT ANGINA PECTORIS: ICD-10-CM

## 2022-07-31 DIAGNOSIS — K21.9 GASTROESOPHAGEAL REFLUX DISEASE WITHOUT ESOPHAGITIS: ICD-10-CM

## 2022-07-31 DIAGNOSIS — F31.81 BIPOLAR 2 DISORDER (HCC): ICD-10-CM

## 2022-07-31 DIAGNOSIS — E78.2 MIXED HYPERLIPIDEMIA: ICD-10-CM

## 2022-07-31 DIAGNOSIS — J30.89 NON-SEASONAL ALLERGIC RHINITIS DUE TO OTHER ALLERGIC TRIGGER: ICD-10-CM

## 2022-07-31 DIAGNOSIS — J34.89 RHINORRHEA: ICD-10-CM

## 2022-07-31 DIAGNOSIS — E11.9 CONTROLLED TYPE 2 DIABETES MELLITUS WITHOUT COMPLICATION, WITHOUT LONG-TERM CURRENT USE OF INSULIN (HCC): ICD-10-CM

## 2022-08-01 ENCOUNTER — HOSPITAL ENCOUNTER (OUTPATIENT)
Age: 67
Discharge: HOME | End: 2022-08-01
Payer: MEDICARE

## 2022-08-01 ENCOUNTER — TELEPHONE (OUTPATIENT)
Dept: CARDIOLOGY CLINIC | Age: 67
End: 2022-08-01

## 2022-08-01 ENCOUNTER — HOSPITAL ENCOUNTER (EMERGENCY)
Age: 67
Discharge: HOME | End: 2022-08-01
Payer: MEDICARE

## 2022-08-01 VITALS
HEART RATE: 82 BPM | SYSTOLIC BLOOD PRESSURE: 161 MMHG | RESPIRATION RATE: 16 BRPM | OXYGEN SATURATION: 97 % | DIASTOLIC BLOOD PRESSURE: 107 MMHG | HEIGHT: 71 IN | BODY MASS INDEX: 35.7 KG/M2 | TEMPERATURE: 97.8 F | WEIGHT: 255 LBS

## 2022-08-01 DIAGNOSIS — R79.89 ELEVATED LFTS: ICD-10-CM

## 2022-08-01 DIAGNOSIS — S05.01XA ABRASION OF RIGHT CORNEA, INITIAL ENCOUNTER: Primary | ICD-10-CM

## 2022-08-01 DIAGNOSIS — T15.01XA FOREIGN BODY OF RIGHT CORNEA, INITIAL ENCOUNTER: ICD-10-CM

## 2022-08-01 LAB
ALBUMIN SERPL-MCNC: 4.1 G/DL (ref 3.4–5)
ALP BLD-CCNC: 95 U/L (ref 40–129)
ALT SERPL-CCNC: 47 U/L (ref 10–40)
AST SERPL-CCNC: 19 U/L (ref 15–37)
BILIRUB SERPL-MCNC: <0.2 MG/DL (ref 0–1)
BILIRUBIN DIRECT: <0.2 MG/DL (ref 0–0.3)
BILIRUBIN, INDIRECT: ABNORMAL MG/DL (ref 0–1)
PROSTATE SPECIFIC ANTIGEN: 1.41 NG/ML (ref 0–4)
TOTAL PROTEIN: 6.4 G/DL (ref 6.4–8.2)

## 2022-08-01 PROCEDURE — 84270 ASSAY OF SEX HORMONE GLOBUL: CPT

## 2022-08-01 PROCEDURE — 84153 ASSAY OF PSA TOTAL: CPT

## 2022-08-01 PROCEDURE — 84403 ASSAY OF TOTAL TESTOSTERONE: CPT

## 2022-08-01 PROCEDURE — 80076 HEPATIC FUNCTION PANEL: CPT

## 2022-08-01 PROCEDURE — 90715 TDAP VACCINE 7 YRS/> IM: CPT | Performed by: PHYSICIAN ASSISTANT

## 2022-08-01 PROCEDURE — 99284 EMERGENCY DEPT VISIT MOD MDM: CPT

## 2022-08-01 PROCEDURE — 6360000002 HC RX W HCPCS: Performed by: PHYSICIAN ASSISTANT

## 2022-08-01 PROCEDURE — 36415 COLL VENOUS BLD VENIPUNCTURE: CPT

## 2022-08-01 PROCEDURE — 90471 IMMUNIZATION ADMIN: CPT | Performed by: PHYSICIAN ASSISTANT

## 2022-08-01 RX ORDER — SULFACETAMIDE SODIUM 100 MG/ML
SOLUTION/ DROPS OPHTHALMIC
Status: DISCONTINUED
Start: 2022-08-01 | End: 2022-08-01 | Stop reason: HOSPADM

## 2022-08-01 RX ORDER — PROPARACAINE HYDROCHLORIDE 5 MG/ML
SOLUTION/ DROPS OPHTHALMIC
Status: DISCONTINUED
Start: 2022-08-01 | End: 2022-08-01 | Stop reason: HOSPADM

## 2022-08-01 RX ORDER — SULFACETAMIDE SODIUM 100 MG/ML
2 SOLUTION/ DROPS OPHTHALMIC 4 TIMES DAILY
Status: DISCONTINUED | OUTPATIENT
Start: 2022-08-01 | End: 2022-08-01 | Stop reason: HOSPADM

## 2022-08-01 RX ADMIN — TETANUS TOXOID, REDUCED DIPHTHERIA TOXOID AND ACELLULAR PERTUSSIS VACCINE, ADSORBED 0.5 ML: 5; 2.5; 8; 8; 2.5 SUSPENSION INTRAMUSCULAR at 10:44

## 2022-08-01 NOTE — TELEPHONE ENCOUNTER
----- Message from Shawna Harrison MD sent at 8/1/2022  4:15 PM EDT -----  Liver tests unchanged. F/u me as scheduled.    F/u PCP    MMK

## 2022-08-03 LAB
SEX HORMONE BINDING GLOBULIN: 31 NMOL/L (ref 11–80)
TESTOSTERONE FREE-NONMALE: 33.4 PG/ML (ref 47–244)
TESTOSTERONE TOTAL: 171 NG/DL (ref 220–1000)

## 2022-08-08 ENCOUNTER — HOSPITAL ENCOUNTER (OUTPATIENT)
Age: 67
Discharge: HOME | End: 2022-08-08
Payer: MEDICARE

## 2022-08-08 LAB — PROSTATE SPECIFIC ANTIGEN: 1.38 NG/ML (ref 0–4)

## 2022-08-08 PROCEDURE — 84403 ASSAY OF TOTAL TESTOSTERONE: CPT

## 2022-08-08 PROCEDURE — 84270 ASSAY OF SEX HORMONE GLOBUL: CPT

## 2022-08-08 PROCEDURE — 84153 ASSAY OF PSA TOTAL: CPT

## 2022-08-08 PROCEDURE — 36415 COLL VENOUS BLD VENIPUNCTURE: CPT

## 2022-08-10 ENCOUNTER — PATIENT MESSAGE (OUTPATIENT)
Dept: FAMILY MEDICINE CLINIC | Age: 67
End: 2022-08-10

## 2022-08-10 DIAGNOSIS — F31.81 BIPOLAR 2 DISORDER (HCC): ICD-10-CM

## 2022-08-10 DIAGNOSIS — I25.5 ISCHEMIC CARDIOMYOPATHY: ICD-10-CM

## 2022-08-10 DIAGNOSIS — E78.2 MIXED HYPERLIPIDEMIA: ICD-10-CM

## 2022-08-10 DIAGNOSIS — E11.9 CONTROLLED TYPE 2 DIABETES MELLITUS WITHOUT COMPLICATION, WITHOUT LONG-TERM CURRENT USE OF INSULIN (HCC): ICD-10-CM

## 2022-08-10 DIAGNOSIS — R09.81 NASAL CONGESTION: ICD-10-CM

## 2022-08-10 LAB
SEX HORMONE BINDING GLOBULIN: 27 NMOL/L (ref 11–80)
TESTOSTERONE FREE-NONMALE: 16.1 PG/ML (ref 47–244)
TESTOSTERONE TOTAL: 79 NG/DL (ref 220–1000)

## 2022-08-10 RX ORDER — ATORVASTATIN CALCIUM 80 MG/1
TABLET, FILM COATED ORAL
Qty: 90 TABLET | Refills: 3 | Status: SHIPPED | OUTPATIENT
Start: 2022-08-10

## 2022-08-10 RX ORDER — BUPROPION HYDROCHLORIDE 150 MG/1
TABLET ORAL
Qty: 90 TABLET | Refills: 3 | Status: CANCELLED | OUTPATIENT
Start: 2022-08-10

## 2022-08-10 RX ORDER — METOPROLOL SUCCINATE 25 MG/1
TABLET, EXTENDED RELEASE ORAL
Qty: 90 TABLET | Refills: 4 | Status: CANCELLED | OUTPATIENT
Start: 2022-08-10

## 2022-08-10 RX ORDER — METOPROLOL SUCCINATE 25 MG/1
TABLET, EXTENDED RELEASE ORAL
Qty: 90 TABLET | Refills: 3 | Status: SHIPPED | OUTPATIENT
Start: 2022-08-10

## 2022-08-10 RX ORDER — METOPROLOL SUCCINATE 50 MG/1
TABLET, EXTENDED RELEASE ORAL
Qty: 90 TABLET | Refills: 3 | Status: SHIPPED | OUTPATIENT
Start: 2022-08-10

## 2022-08-10 RX ORDER — BUPROPION HYDROCHLORIDE 300 MG/1
TABLET ORAL
Qty: 90 TABLET | Refills: 3 | Status: CANCELLED | OUTPATIENT
Start: 2022-08-10

## 2022-08-10 RX ORDER — IPRATROPIUM BROMIDE 42 UG/1
SPRAY, METERED NASAL
Qty: 45 ML | Refills: 3 | Status: SHIPPED | OUTPATIENT
Start: 2022-08-10

## 2022-08-10 RX ORDER — FLUTICASONE PROPIONATE 50 MCG
SPRAY, SUSPENSION (ML) NASAL
Qty: 48 G | Refills: 3 | Status: SHIPPED | OUTPATIENT
Start: 2022-08-10

## 2022-08-10 RX ORDER — METOPROLOL SUCCINATE 50 MG/1
TABLET, EXTENDED RELEASE ORAL
Qty: 90 TABLET | Refills: 4 | Status: CANCELLED | OUTPATIENT
Start: 2022-08-10

## 2022-08-10 RX ORDER — FENOFIBRATE 160 MG/1
160 TABLET ORAL DAILY
Qty: 90 TABLET | Refills: 3 | Status: CANCELLED | OUTPATIENT
Start: 2022-08-10

## 2022-08-10 RX ORDER — BUPROPION HYDROCHLORIDE 150 MG/1
TABLET ORAL
Qty: 90 TABLET | Refills: 3 | Status: SHIPPED | OUTPATIENT
Start: 2022-08-10

## 2022-08-10 RX ORDER — FAMOTIDINE 20 MG/1
20 TABLET, FILM COATED ORAL 2 TIMES DAILY
Qty: 180 TABLET | Refills: 3 | Status: SHIPPED | OUTPATIENT
Start: 2022-08-10

## 2022-08-10 RX ORDER — FENOFIBRATE 160 MG/1
160 TABLET ORAL DAILY
Qty: 90 TABLET | Refills: 3 | Status: SHIPPED | OUTPATIENT
Start: 2022-08-10 | End: 2022-08-29

## 2022-08-10 RX ORDER — BUPROPION HYDROCHLORIDE 300 MG/1
TABLET ORAL
Qty: 90 TABLET | Refills: 3 | Status: SHIPPED | OUTPATIENT
Start: 2022-08-10

## 2022-08-10 NOTE — TELEPHONE ENCOUNTER
From: Stephanie Scanlon  To: Dr. Phelan Dam: 8/10/2022 5:08 AM EDT  Subject: PLS REORDER ALL RECURRING SCRIPTS TO NEW PHARMACY    My new Mail Order pharmacy is:  [facility]  [address] [place] [address]    Here are the recurring scripts I am currently using:     Ipratroppium Bromide Nasal Solution  Fluticasone Propionate Nasal Spray  Bupropion Hcl XI Tabs 150 Mg (1x / day)  Bupropion Hcl XI Tabs 300 Mg (1x / day)  Metformin Hcl Tabs 1000 Mg (2x / day)  Atorvastatin Tabs 80 Mg (1x / day)  Famotidine Tabs 20 Mg (2x / day)  Fenofibrate Tabs 160 Mg (1x / day)  Metoprolol Succinate Er Tabs 25 Mg (1x / day)  Metoprolol Succinate Er Tabs 50 Mg (1x / day)    I AM OUT OF THESE MEDS! Thank You,  [address] [place]  [phone]  [email or web address]  : 1955

## 2022-08-10 NOTE — TELEPHONE ENCOUNTER
All meds was pended per pt request.     Joceline Benjamin into address and there is none that match that location but pended for the other home delivery [facility].

## 2022-08-10 NOTE — TELEPHONE ENCOUNTER
Pt sent msg with med RF request on 7/31/22. Pls pend these for [facility] for 90 days. Pls see pt's other message from 7/31/22 about concern that the address he has for [facility] is not the same as what Epic shows. Any thoughts about that?

## 2022-08-11 RX ORDER — LAMOTRIGINE 50 MG/1
1 TABLET, ORALLY DISINTEGRATING ORAL DAILY
Qty: 90 TABLET | Refills: 1 | Status: SHIPPED | OUTPATIENT
Start: 2022-08-11 | End: 2022-08-12

## 2022-08-11 NOTE — TELEPHONE ENCOUNTER
ROSEI. [de-identified] : pt received a supartz injection in the left knee \par \par Knee injection viscosupplementation: I discussed at length with the patient the planned steroid and lidocaine injection for primary osteoarthritis. The risks, benefits, convalescence and alternatives were reviewed and pt consented for injection. The possible side effects discussed included but were not limited to: pain, swelling, heat and redness. There symptoms are generally mild but if they are extensive then contact the office. Giving pain relievers by mouth such as NSAID's or Tylenol can generally treat the reactions to injection. Rare cases of infection have been noted. Rash, hives and itching may occur post injection. If you have muscle pain or cramps, flushing and or swelling of the face, rapid heart beat, nausea, dizziness, fever, chills, headache, difficulty breathing, swelling in the arms or legs, or have a prickly feeling of your skin, contact a health care provider immediately. Following this discussion, the knee was prepped with alcohol and under sterile condition the injection was performed through a superolateral injection site with a 20 gauge needle. The needle was introduced into the joint, aspiration was performed to ensure intra-articular placement and the medication was injected. Upon withdrawal of the needle the site was cleaned with alcohol and a band aid applied. The patient tolerated the injection well and there were no adverse effects. Post injection instructions included no strenuous activity for 24 hours, cryotherapy and if there are any adverse effects to contact the office. \par

## 2022-08-12 RX ORDER — LAMOTRIGINE 25 MG/1
50 TABLET ORAL DAILY
Qty: 180 TABLET | Refills: 3 | Status: SHIPPED | OUTPATIENT
Start: 2022-08-12

## 2022-08-12 RX ORDER — MOMETASONE FUROATE 50 UG/1
2 SPRAY, METERED NASAL DAILY
Qty: 1 EACH | Refills: 3 | Status: SHIPPED | OUTPATIENT
Start: 2022-08-12

## 2022-08-17 NOTE — TELEPHONE ENCOUNTER
Submitted PA for lamoTRIgine 25MG tablets, Key: [id number]. PER PLAN- This medication or product is on your plan's list of covered drugs. Prior authorization is not required at this time. If your pharmacy has questions regarding the processing of your prescription, please have them call the [facility] pharmacy help desk at [phone]. **Please note: This request was submitted electronically. Formulary lowering, tiering exception, cost reduction and/or pre-benefit determination review (including prospective Medicare hospice reviews) requests cannot be requested using this method of submission. Providers contact us at [phone] for further assistance. Please notify patient. Thank you.

## 2022-08-29 DIAGNOSIS — E78.2 MIXED HYPERLIPIDEMIA: ICD-10-CM

## 2022-08-29 RX ORDER — FENOFIBRATE 160 MG/1
TABLET ORAL
Qty: 90 TABLET | Refills: 3 | Status: SHIPPED | OUTPATIENT
Start: 2022-08-29 | End: 2022-09-12 | Stop reason: SDUPTHER

## 2022-08-31 LAB
CATARACTS: POSITIVE
DIABETIC RETINOPATHY: NEGATIVE
GLAUCOMA: POSITIVE
INTRAOCULAR PRESSURE EYE: NORMAL
VISUAL ACUITY DISTANCE LEFT EYE: NORMAL
VISUAL ACUITY DISTANCE RIGHT EYE: NORMAL

## 2022-09-12 ENCOUNTER — OFFICE VISIT (OUTPATIENT)
Dept: FAMILY MEDICINE CLINIC | Age: 67
End: 2022-09-12
Payer: MEDICARE

## 2022-09-12 VITALS
HEIGHT: 71 IN | OXYGEN SATURATION: 97 % | HEART RATE: 75 BPM | SYSTOLIC BLOOD PRESSURE: 130 MMHG | BODY MASS INDEX: 36.96 KG/M2 | DIASTOLIC BLOOD PRESSURE: 84 MMHG | WEIGHT: 264 LBS

## 2022-09-12 DIAGNOSIS — M54.9 MUSCULOSKELETAL BACK PAIN: ICD-10-CM

## 2022-09-12 DIAGNOSIS — Z23 NEEDS FLU SHOT: ICD-10-CM

## 2022-09-12 DIAGNOSIS — R53.82 CHRONIC FATIGUE: Primary | ICD-10-CM

## 2022-09-12 DIAGNOSIS — I50.22 CHRONIC SYSTOLIC (CONGESTIVE) HEART FAILURE (HCC): ICD-10-CM

## 2022-09-12 DIAGNOSIS — F31.81 BIPOLAR 2 DISORDER (HCC): ICD-10-CM

## 2022-09-12 DIAGNOSIS — R25.2 MUSCLE CRAMPS: ICD-10-CM

## 2022-09-12 DIAGNOSIS — E78.2 MIXED HYPERLIPIDEMIA: ICD-10-CM

## 2022-09-12 DIAGNOSIS — N18.31 STAGE 3A CHRONIC KIDNEY DISEASE (HCC): ICD-10-CM

## 2022-09-12 PROBLEM — N18.30 CHRONIC RENAL DISEASE, STAGE III (HCC): Status: ACTIVE | Noted: 2022-09-12

## 2022-09-12 PROCEDURE — G8427 DOCREV CUR MEDS BY ELIG CLIN: HCPCS | Performed by: FAMILY MEDICINE

## 2022-09-12 PROCEDURE — 90674 CCIIV4 VAC NO PRSV 0.5 ML IM: CPT | Performed by: FAMILY MEDICINE

## 2022-09-12 PROCEDURE — G0008 ADMIN INFLUENZA VIRUS VAC: HCPCS | Performed by: FAMILY MEDICINE

## 2022-09-12 PROCEDURE — 1123F ACP DISCUSS/DSCN MKR DOCD: CPT | Performed by: FAMILY MEDICINE

## 2022-09-12 PROCEDURE — 99215 OFFICE O/P EST HI 40 MIN: CPT | Performed by: FAMILY MEDICINE

## 2022-09-12 PROCEDURE — 3017F COLORECTAL CA SCREEN DOC REV: CPT | Performed by: FAMILY MEDICINE

## 2022-09-12 PROCEDURE — 1036F TOBACCO NON-USER: CPT | Performed by: FAMILY MEDICINE

## 2022-09-12 PROCEDURE — G8417 CALC BMI ABV UP PARAM F/U: HCPCS | Performed by: FAMILY MEDICINE

## 2022-09-12 RX ORDER — PREGABALIN 100 MG/1
100 CAPSULE ORAL 2 TIMES DAILY
Qty: 60 CAPSULE | Refills: 5 | Status: SHIPPED | OUTPATIENT
Start: 2022-09-12 | End: 2022-10-12

## 2022-09-12 RX ORDER — FENOFIBRATE 160 MG/1
TABLET ORAL
Qty: 90 TABLET | Refills: 3 | Status: SHIPPED | OUTPATIENT
Start: 2022-09-12

## 2022-09-12 NOTE — PROGRESS NOTES
Assessment/Plan:    Eleanor Jose was seen today for pain. Diagnoses and all orders for this visit:    Chronic fatigue  -     Cortisol AM, Total; Future. Adrenal insuff unlikely, though it is reasonable to rule it out. Likely 2/2 hypogonadism, awaiting Androgel  at pharmacy. Consider sleep study if fatigue does not improve. Stage 3a chronic kidney disease ([facility]   Pt is asked to avoid nsaids. Perhaps lyrica will obviate need. Chronic systolic (congestive) heart failure ([facility]   Per Dr Scarlet Clark 2 disorder [facility])   Mood has stablized with addition of lamictal. Pt feels better, no further ryan. Mixed hyperlipidemia  -     fenofibrate (TRIGLIDE) 160 MG tablet; TAKE 1 TABLET DAILY, rf    Musculoskeletal back pain  -     pregabalin (LYRICA) 100 MG capsule; Take 1 capsule by mouth 2 times daily for 30 days. Med was rx'd last visit though pharmacy did not dispense. Pt plans to return to surgeon in [place], [place]. Needs flu shot  -     Influenza, FLUCELVAX, (age 10 mo+), IM, Preservative Free, 0.5 mL    Muscle cramps   Pt reports B12 has been helpful. OK to cont, though pt can d/c iron, iodine     There are no Patient Instructions on file for this visit. 45 min visit      Patient: Becky Patterson is a 74 y. o.male who presents today with the following Chief Complaint(s):  Chief Complaint   Patient presents with    Pain         HPI: Pt with BP2, DM2, Htn, HLD, CAD and ischemic cmp (Dr [facility]), CKD (Dr Anthony Camacho in past), gerd and hypogonadism picked up 13 lb item 12/2021 and had intense low back pain L>R. Washing dishes, bending forward, etc very painful. Was unable to drive to [facility] for winter 2/2 back pain. Pt was rx'd slow pred taper 7/21/22 starting with 60 mg qwk x 1 wk, then 50 mg x 1 wk, etc. Pt ideally avoids nsaids 2/2 CKD. Cr 1.7, GFR 41 7/21/22. Has been off pred x 2 wk. Back pain has improved but if stresses back, has back pain x few days.  Has been on ibu 200 2 bid (out of desperation). Is installing new AC for mother, stressful to back. Hydrocodone, percocet not helpful. Is planning to return to back surgeon [place]. Is taking bicycle to [facility], has felt well in past when rode bike daily. Is currently too tired to ride but hopeful bike riding will lessen back pain. Pt reports severe L calf and ankle cramping that responds to sea Iodine 1000 mcg 1d, B12 5000 mcg qd, ferrous gluconate 324 mg qd, vit D3 125 mcg qd. Pt reports severe exhaustion x several mo's. Is due to  Androgel rx but pharmacy has been slow to obtain. Had recent testosterone level by Dr Michelle Vieira, testosterone 79. Pt conts on wellbutrin, and lamictal 25 2 po qd was added 1 mo ago when pt reported ryan. Is feeling better, no longer manic. Fatigue is very concerning to pt. Saw pulm in [place] in past who told pt by his answers to questions that ROXANA is unlikely. Has not had sleep study. Pt wonders if he may have had covid in past w/o sx's and wonders if this is etiology of fatigue. Lab Results   Component Value Date    LABA1C 6.9 07/21/2022    LABA1C 6.8 12/13/2021    LABA1C 7.5 09/23/2021     :            Current Outpatient Medications   Medication Sig Dispense Refill    fenofibrate (TRIGLIDE) 160 MG tablet TAKE 1 TABLET DAILY 90 tablet 3    pregabalin (LYRICA) 100 MG capsule Take 1 capsule by mouth 2 times daily for 30 days. 60 capsule 5    lamoTRIgine (LAMICTAL) 25 MG tablet Take 2 tablets by mouth in the morning. 180 tablet 3    atorvastatin (LIPITOR) 80 MG tablet TAKE 1 TABLET NIGHTLY 90 tablet 3    buPROPion (WELLBUTRIN XL) 150 MG extended release tablet TAKE 1 TABLET EVERY MORNING 90 tablet 3    buPROPion (WELLBUTRIN XL) 300 MG extended release tablet TAKE 1 TABLET EVERY MORNING 90 tablet 3    metoprolol succinate (TOPROL XL) 25 MG extended release tablet TAKE 1 TABLET DAILY.  THIS IS IN ADDITION TO 50 MG TABLET TO TOTAL 75 MG 90 tablet 3    metoprolol succinate (TOPROL XL) 50 MG extended release tablet TAKE 1 TABLET DAILY. THIS IS IN ADDITION TO 25 MG TABLET TO TOTAL 75 MG 90 tablet 3    metFORMIN (GLUCOPHAGE) 1000 MG tablet Take 1 tablet by mouth in the morning and 1 tablet in the evening. Take with meals. 180 tablet 3    famotidine (PEPCID) 20 MG tablet Take 1 tablet by mouth in the morning and 1 tablet before bedtime. 180 tablet 3    fluticasone (FLONASE) 50 MCG/ACT nasal spray USE 2 SPRAYS IN EACH NOSTRIL ONCE DAILY 48 g 3    ipratropium (ATROVENT) 0.06 % nasal spray instill 2 sprays into each nostril twice a day 45 mL 3    blood glucose monitor strips Test 1 time a day & as needed for symptoms of irregular blood glucose. Dispense sufficient amount for indicated testing frequency plus additional to accommodate PRN testing needs. 100 strip 3    Aspirin (ASPIR-81 PO) Take 325 mg by mouth 2 times daily       sildenafil (VIAGRA) 100 MG tablet Take 1 tablet by mouth as needed for Erectile Dysfunction 30 tablet 1    mometasone (NASONEX) 50 MCG/ACT nasal spray 2 sprays by Nasal route in the morning. 1 each 3     Current Facility-Administered Medications   Medication Dose Route Frequency Provider Last Rate Last Admin    MethylPREDNISolone Acetate SUSP 40 mg  40 mg Injection Once Mary MD Roddy           Patient's past medical history,surgical history, family history, medications,  and allergies  were all reviewed and updated as appropriate today. Review of Systems  Comprehensive ROS normal, except positives in HPI      Physical Exam  Constitutional:       Appearance: Normal appearance. He is well-developed. HENT:      Head: Normocephalic and atraumatic. Right Ear: External ear normal.      Left Ear: External ear normal.   Eyes:      General: No scleral icterus. Right eye: No discharge. Left eye: No discharge. Extraocular Movements: Extraocular movements intact.       Conjunctiva/sclera: Conjunctivae normal.   Neck:      Comments: No visualized masses  FROM  Pulmonary: Effort: Pulmonary effort is normal.   Musculoskeletal:         General: Normal range of motion. Skin:     General: Skin is warm and dry. Neurological:      General: No focal deficit present. Mental Status: He is alert and oriented to person, place, and time. Psychiatric:         Mood and Affect: Mood normal.         Behavior: Behavior normal.         Thought Content:  Thought content normal.         Judgment: Judgment normal.         /84   Pulse 75   Ht 5' 10.5\" (1.791 m)   Wt 264 lb (119.7 kg)   SpO2 97%   BMI 37.34 kg/m²

## 2022-12-11 ENCOUNTER — HOSPITAL ENCOUNTER (OUTPATIENT)
Age: 67
Discharge: HOME | End: 2022-12-11
Payer: MEDICARE

## 2022-12-11 PROCEDURE — 84403 ASSAY OF TOTAL TESTOSTERONE: CPT

## 2022-12-11 PROCEDURE — 36415 COLL VENOUS BLD VENIPUNCTURE: CPT

## 2022-12-11 PROCEDURE — 84270 ASSAY OF SEX HORMONE GLOBUL: CPT

## 2022-12-11 PROCEDURE — 84153 ASSAY OF PSA TOTAL: CPT

## 2022-12-12 LAB — PROSTATE SPECIFIC ANTIGEN: 2.08 NG/ML (ref 0–4)

## 2022-12-14 LAB
SEX HORMONE BINDING GLOBULIN: 36 NMOL/L (ref 11–80)
TESTOSTERONE FREE-NONMALE: 107.8 PG/ML (ref 47–244)
TESTOSTERONE TOTAL: 535 NG/DL (ref 220–1000)

## 2022-12-20 DIAGNOSIS — J34.89 RHINORRHEA: ICD-10-CM

## 2022-12-21 RX ORDER — IPRATROPIUM BROMIDE 42 UG/1
SPRAY, METERED NASAL
Qty: 75 ML | Refills: 3 | Status: SHIPPED | OUTPATIENT
Start: 2022-12-21

## 2022-12-21 NOTE — TELEPHONE ENCOUNTER
Last Office Visit  -  9/12/22  Next Office Visit  -  none    Last Filled  -    Last UDS -    Contract -

## 2023-07-19 RX ORDER — LAMOTRIGINE 25 MG/1
50 TABLET ORAL DAILY
Qty: 180 TABLET | Refills: 3 | Status: SHIPPED | OUTPATIENT
Start: 2023-07-19

## 2023-07-19 NOTE — TELEPHONE ENCOUNTER
Last Office Visit  -  9/12/22  Next Office Visit  -  NA    Last Filled  -  8/12/22  lamoTRIgine 25 MG Oral Tablet

## 2023-09-03 DIAGNOSIS — E78.2 MIXED HYPERLIPIDEMIA: ICD-10-CM

## 2023-09-05 RX ORDER — FENOFIBRATE 160 MG/1
TABLET ORAL
Qty: 90 TABLET | Refills: 3 | Status: SHIPPED | OUTPATIENT
Start: 2023-09-05

## 2023-11-07 SDOH — ECONOMIC STABILITY: TRANSPORTATION INSECURITY
IN THE PAST 12 MONTHS, HAS LACK OF TRANSPORTATION KEPT YOU FROM MEETINGS, WORK, OR FROM GETTING THINGS NEEDED FOR DAILY LIVING?: NO

## 2023-11-07 SDOH — ECONOMIC STABILITY: FOOD INSECURITY: WITHIN THE PAST 12 MONTHS, YOU WORRIED THAT YOUR FOOD WOULD RUN OUT BEFORE YOU GOT MONEY TO BUY MORE.: NEVER TRUE

## 2023-11-07 SDOH — ECONOMIC STABILITY: FOOD INSECURITY: WITHIN THE PAST 12 MONTHS, THE FOOD YOU BOUGHT JUST DIDN'T LAST AND YOU DIDN'T HAVE MONEY TO GET MORE.: NEVER TRUE

## 2023-11-07 SDOH — ECONOMIC STABILITY: INCOME INSECURITY: HOW HARD IS IT FOR YOU TO PAY FOR THE VERY BASICS LIKE FOOD, HOUSING, MEDICAL CARE, AND HEATING?: NOT VERY HARD

## 2023-11-07 SDOH — ECONOMIC STABILITY: HOUSING INSECURITY
IN THE LAST 12 MONTHS, WAS THERE A TIME WHEN YOU DID NOT HAVE A STEADY PLACE TO SLEEP OR SLEPT IN A SHELTER (INCLUDING NOW)?: NO

## 2023-11-07 ASSESSMENT — PATIENT HEALTH QUESTIONNAIRE - PHQ9
1. LITTLE INTEREST OR PLEASURE IN DOING THINGS: 3
9. THOUGHTS THAT YOU WOULD BE BETTER OFF DEAD, OR OF HURTING YOURSELF: NOT AT ALL
10. IF YOU CHECKED OFF ANY PROBLEMS, HOW DIFFICULT HAVE THESE PROBLEMS MADE IT FOR YOU TO DO YOUR WORK, TAKE CARE OF THINGS AT HOME, OR GET ALONG WITH OTHER PEOPLE: SOMEWHAT DIFFICULT
1. LITTLE INTEREST OR PLEASURE IN DOING THINGS: NEARLY EVERY DAY
5. POOR APPETITE OR OVEREATING: NOT AT ALL
3. TROUBLE FALLING OR STAYING ASLEEP: 3
10. IF YOU CHECKED OFF ANY PROBLEMS, HOW DIFFICULT HAVE THESE PROBLEMS MADE IT FOR YOU TO DO YOUR WORK, TAKE CARE OF THINGS AT HOME, OR GET ALONG WITH OTHER PEOPLE: 1
4. FEELING TIRED OR HAVING LITTLE ENERGY: 3
5. POOR APPETITE OR OVEREATING: 0
SUM OF ALL RESPONSES TO PHQ9 QUESTIONS 1 & 2: 6
6. FEELING BAD ABOUT YOURSELF - OR THAT YOU ARE A FAILURE OR HAVE LET YOURSELF OR YOUR FAMILY DOWN: NOT AT ALL
8. MOVING OR SPEAKING SO SLOWLY THAT OTHER PEOPLE COULD HAVE NOTICED. OR THE OPPOSITE, BEING SO FIGETY OR RESTLESS THAT YOU HAVE BEEN MOVING AROUND A LOT MORE THAN USUAL: 0
2. FEELING DOWN, DEPRESSED OR HOPELESS: NEARLY EVERY DAY
8. MOVING OR SPEAKING SO SLOWLY THAT OTHER PEOPLE COULD HAVE NOTICED. OR THE OPPOSITE - BEING SO FIDGETY OR RESTLESS THAT YOU HAVE BEEN MOVING AROUND A LOT MORE THAN USUAL: NOT AT ALL
SUM OF ALL RESPONSES TO PHQ QUESTIONS 1-9: 12
6. FEELING BAD ABOUT YOURSELF - OR THAT YOU ARE A FAILURE OR HAVE LET YOURSELF OR YOUR FAMILY DOWN: 0
9. THOUGHTS THAT YOU WOULD BE BETTER OFF DEAD, OR OF HURTING YOURSELF: 0
7. TROUBLE CONCENTRATING ON THINGS, SUCH AS READING THE NEWSPAPER OR WATCHING TELEVISION: 0
SUM OF ALL RESPONSES TO PHQ QUESTIONS 1-9: 12
7. TROUBLE CONCENTRATING ON THINGS, SUCH AS READING THE NEWSPAPER OR WATCHING TELEVISION: NOT AT ALL
3. TROUBLE FALLING OR STAYING ASLEEP: NEARLY EVERY DAY
SUM OF ALL RESPONSES TO PHQ QUESTIONS 1-9: 12
SUM OF ALL RESPONSES TO PHQ QUESTIONS 1-9: 12
2. FEELING DOWN, DEPRESSED OR HOPELESS: 3
SUM OF ALL RESPONSES TO PHQ QUESTIONS 1-9: 12
4. FEELING TIRED OR HAVING LITTLE ENERGY: NEARLY EVERY DAY

## 2023-11-09 ENCOUNTER — OFFICE VISIT (OUTPATIENT)
Dept: FAMILY MEDICINE CLINIC | Age: 68
End: 2023-11-09

## 2023-11-09 VITALS
WEIGHT: 256 LBS | OXYGEN SATURATION: 95 % | BODY MASS INDEX: 35.84 KG/M2 | HEIGHT: 71 IN | SYSTOLIC BLOOD PRESSURE: 130 MMHG | HEART RATE: 90 BPM | DIASTOLIC BLOOD PRESSURE: 70 MMHG

## 2023-11-09 DIAGNOSIS — I10 BENIGN ESSENTIAL HTN: ICD-10-CM

## 2023-11-09 DIAGNOSIS — F33.0 MAJOR DEPRESSIVE DISORDER, RECURRENT, MILD (HCC): ICD-10-CM

## 2023-11-09 DIAGNOSIS — J30.89 NON-SEASONAL ALLERGIC RHINITIS DUE TO OTHER ALLERGIC TRIGGER: ICD-10-CM

## 2023-11-09 DIAGNOSIS — E78.2 MIXED HYPERLIPIDEMIA: ICD-10-CM

## 2023-11-09 DIAGNOSIS — M25.50 ARTHRALGIA OF MULTIPLE JOINTS: ICD-10-CM

## 2023-11-09 DIAGNOSIS — F31.81 BIPOLAR 2 DISORDER (HCC): ICD-10-CM

## 2023-11-09 DIAGNOSIS — E11.65 CONTROLLED TYPE 2 DIABETES MELLITUS WITH HYPERGLYCEMIA, WITHOUT LONG-TERM CURRENT USE OF INSULIN (HCC): ICD-10-CM

## 2023-11-09 DIAGNOSIS — Z13.31 POSITIVE DEPRESSION SCREENING: ICD-10-CM

## 2023-11-09 DIAGNOSIS — R79.89 ELEVATED LFTS: ICD-10-CM

## 2023-11-09 DIAGNOSIS — I25.10 CORONARY ARTERY DISEASE INVOLVING NATIVE CORONARY ARTERY OF NATIVE HEART WITHOUT ANGINA PECTORIS: ICD-10-CM

## 2023-11-09 DIAGNOSIS — M25.562 LEFT KNEE PAIN, UNSPECIFIED CHRONICITY: ICD-10-CM

## 2023-11-09 DIAGNOSIS — N18.31 STAGE 3A CHRONIC KIDNEY DISEASE (HCC): ICD-10-CM

## 2023-11-09 DIAGNOSIS — M17.10 LOCALIZED OSTEOARTHRITIS OF KNEE: ICD-10-CM

## 2023-11-09 DIAGNOSIS — E66.01 SEVERE OBESITY (BMI 35.0-39.9) WITH COMORBIDITY (HCC): ICD-10-CM

## 2023-11-09 DIAGNOSIS — I25.5 ISCHEMIC CARDIOMYOPATHY: ICD-10-CM

## 2023-11-09 DIAGNOSIS — J34.89 RHINORRHEA: ICD-10-CM

## 2023-11-09 DIAGNOSIS — E78.2 MIXED HYPERLIPIDEMIA: Primary | ICD-10-CM

## 2023-11-09 DIAGNOSIS — E11.9 CONTROLLED TYPE 2 DIABETES MELLITUS WITHOUT COMPLICATION, WITHOUT LONG-TERM CURRENT USE OF INSULIN (HCC): ICD-10-CM

## 2023-11-09 DIAGNOSIS — K21.9 GASTROESOPHAGEAL REFLUX DISEASE WITHOUT ESOPHAGITIS: ICD-10-CM

## 2023-11-09 PROBLEM — E11.22 TYPE 2 DIABETES MELLITUS WITH CHRONIC KIDNEY DISEASE (HCC): Status: ACTIVE | Noted: 2023-11-09

## 2023-11-09 PROBLEM — F33.1 MAJOR DEPRESSIVE DISORDER, RECURRENT, MODERATE (HCC): Status: ACTIVE | Noted: 2023-11-09

## 2023-11-09 PROBLEM — F33.9 MAJOR DEPRESSIVE DISORDER, RECURRENT, UNSPECIFIED (HCC): Status: ACTIVE | Noted: 2023-11-09

## 2023-11-09 PROBLEM — E11.40 TYPE 2 DIABETES MELLITUS WITH DIABETIC NEUROPATHY (HCC): Status: ACTIVE | Noted: 2023-11-09

## 2023-11-09 RX ORDER — FAMOTIDINE 20 MG/1
TABLET, FILM COATED ORAL
Qty: 180 TABLET | Refills: 3 | Status: SHIPPED | OUTPATIENT
Start: 2023-11-09

## 2023-11-09 RX ORDER — METHYLPREDNISOLONE ACETATE 40 MG/ML
20 INJECTION, SUSPENSION INTRA-ARTICULAR; INTRALESIONAL; INTRAMUSCULAR; SOFT TISSUE ONCE
Status: COMPLETED | OUTPATIENT
Start: 2023-11-09 | End: 2023-11-09

## 2023-11-09 RX ORDER — METOPROLOL SUCCINATE 50 MG/1
TABLET, EXTENDED RELEASE ORAL
Qty: 90 TABLET | Refills: 3 | Status: SHIPPED | OUTPATIENT
Start: 2023-11-09

## 2023-11-09 RX ORDER — BUPROPION HYDROCHLORIDE 150 MG/1
TABLET ORAL
Qty: 90 TABLET | Refills: 3 | Status: SHIPPED | OUTPATIENT
Start: 2023-11-09

## 2023-11-09 RX ORDER — IPRATROPIUM BROMIDE 42 UG/1
SPRAY, METERED NASAL
Qty: 75 ML | Refills: 3 | Status: SHIPPED | OUTPATIENT
Start: 2023-11-09

## 2023-11-09 RX ORDER — METOPROLOL SUCCINATE 25 MG/1
TABLET, EXTENDED RELEASE ORAL
Qty: 90 TABLET | Refills: 3 | Status: SHIPPED | OUTPATIENT
Start: 2023-11-09

## 2023-11-09 RX ORDER — FLUTICASONE PROPIONATE 50 MCG
SPRAY, SUSPENSION (ML) NASAL
Qty: 48 G | Refills: 3 | Status: SHIPPED | OUTPATIENT
Start: 2023-11-09

## 2023-11-09 RX ORDER — LAMOTRIGINE 25 MG/1
50 TABLET ORAL DAILY
Qty: 180 TABLET | Refills: 3 | Status: SHIPPED | OUTPATIENT
Start: 2023-11-09

## 2023-11-09 RX ORDER — BUPROPION HYDROCHLORIDE 300 MG/1
TABLET ORAL
Qty: 90 TABLET | Refills: 3 | Status: SHIPPED | OUTPATIENT
Start: 2023-11-09

## 2023-11-09 RX ADMIN — METHYLPREDNISOLONE ACETATE 20 MG: 40 INJECTION, SUSPENSION INTRA-ARTICULAR; INTRALESIONAL; INTRAMUSCULAR; SOFT TISSUE at 17:43

## 2023-11-10 LAB
ALBUMIN SERPL-MCNC: 4.5 G/DL (ref 3.4–5)
ALBUMIN/GLOB SERPL: 2 {RATIO} (ref 1.1–2.2)
ALP SERPL-CCNC: 142 U/L (ref 40–129)
ALT SERPL-CCNC: 121 U/L (ref 10–40)
ANION GAP SERPL CALCULATED.3IONS-SCNC: 10 MMOL/L (ref 3–16)
AST SERPL-CCNC: 84 U/L (ref 15–37)
BILIRUB SERPL-MCNC: 0.3 MG/DL (ref 0–1)
BUN SERPL-MCNC: 24 MG/DL (ref 7–20)
CALCIUM SERPL-MCNC: 9.4 MG/DL (ref 8.3–10.6)
CHLORIDE SERPL-SCNC: 97 MMOL/L (ref 99–110)
CHOLEST SERPL-MCNC: 276 MG/DL (ref 0–199)
CO2 SERPL-SCNC: 25 MMOL/L (ref 21–32)
CREAT SERPL-MCNC: 1.5 MG/DL (ref 0.8–1.3)
CRP SERPL-MCNC: 12.5 MG/L (ref 0–5.1)
ERYTHROCYTE [SEDIMENTATION RATE] IN BLOOD BY WESTERGREN METHOD: 29 MM/HR (ref 0–20)
EST. AVERAGE GLUCOSE BLD GHB EST-MCNC: 243.2 MG/DL
GFR SERPLBLD CREATININE-BSD FMLA CKD-EPI: 51 ML/MIN/{1.73_M2}
GLUCOSE SERPL-MCNC: 340 MG/DL (ref 70–99)
HBA1C MFR BLD: 10.1 %
HDLC SERPL-MCNC: 35 MG/DL (ref 40–60)
LDLC SERPL CALC-MCNC: ABNORMAL MG/DL
LDLC SERPL-MCNC: 141 MG/DL
POTASSIUM SERPL-SCNC: 4.7 MMOL/L (ref 3.5–5.1)
PROT SERPL-MCNC: 6.7 G/DL (ref 6.4–8.2)
RHEUMATOID FACT SER IA-ACNC: 12 IU/ML
SODIUM SERPL-SCNC: 132 MMOL/L (ref 136–145)
TRIGL SERPL-MCNC: 751 MG/DL (ref 0–150)
VLDLC SERPL CALC-MCNC: ABNORMAL MG/DL

## 2023-11-13 LAB — MISCELLANEOUS LAB TEST ORDER: NORMAL

## 2023-11-20 ENCOUNTER — PATIENT MESSAGE (OUTPATIENT)
Dept: FAMILY MEDICINE CLINIC | Age: 68
End: 2023-11-20

## 2023-12-12 ENCOUNTER — TELEPHONE (OUTPATIENT)
Dept: FAMILY MEDICINE CLINIC | Age: 68
End: 2023-12-12

## 2023-12-12 NOTE — TELEPHONE ENCOUNTER
Pt reached and he was informed per RS and pt is not going to add another provider into the mix. Pt declined the referral.

## 2023-12-29 NOTE — ED PROVIDER NOTES
31-Oct-2023 [facility]        Pt Name: Beatris Middleton  MRN: [id number]  [place] 1955  Date of evaluation: 8/1/2022  Provider: Cassandra Marr PA-C  PCP: Radha Gómez MD  Note Started: 9:52 AM EDT       SAFIA. I have evaluated this patient. My supervising physician was available for consultation. CHIEF COMPLAINT       Chief Complaint   Patient presents with    Eye Pain     Was grinding metal on Thursday and thinks there is something in right eye. C/o pain, tearing, scratching to eye. HISTORY OF PRESENT ILLNESS   (Location, Timing/Onset, Context/Setting, Quality, Duration, Modifying Factors, Severity, Associated Signs and Symptoms)  Note limiting factors. Chief Complaint: Right eye discomfort    Beatris Middleton is a 74 y.o. male who presents to the emergency department with a chief complaint of some right eye discomfort.  4 days ago he was using a  on a fence when he felt something hit his eye. He states he has an occasional irritation in his eye. Denies purulent drainage, visual changes, use of contacts, nausea, vomiting, fevers. He does wear prescriptive glasses. Denies any other symptoms. He is up-to-date on his tetanus. Nursing Notes were all reviewed and agreed with or any disagreements were addressed in the HPI. REVIEW OF SYSTEMS    (2-9 systems for level 4, 10 or more for level 5)     Review of Systems    Positives and Pertinent negatives as per HPI. Except as noted above in the ROS, all other systems were reviewed and negative.        PAST MEDICAL HISTORY     Past Medical History:   Diagnosis Date    Bipolar 2 disorder ([facility] 8/12    CAD (coronary artery disease), native coronary artery 05/2018    LAD, w/u reveals silent MI    Chronic back pain     DDD (degenerative disc disease), cervical 11/13/2017    per xrays    DDD (degenerative disc disease), lumbar     11/17 xrays    DM2 (diabetes mellitus, type 2) ([address]     Elevated LFTs     HLD (hyperlipidemia)     HTN (hypertension)     \"pt states no HTN after weight loss\"    Tachycardia     Tobacco use     Ulcerative colitis ([address]     Vitamin D deficiency 4/13         SURGICAL HISTORY     Past Surgical History:   Procedure Laterality Date    COLONOSCOPY  2000    \"Numerous colonoscopy's per pt\"    COLONOSCOPY  03/20/2018    normal colon with biopsy, grade 1 internal hems. FINE NEEDLE ASPIRATION  12/2018    thyroid nodules, benign, Dr [facility] [place], [address] [facility]    KNEE SURGERY Right 1978    Menisectomy    LUMBAR DISCECTOMY  08/2018    L4-5    LUMBAR SPINE SURGERY  2007, 8/9/2018    L4-5 discectomy    NERVE BLOCK  2005    lumbar spine    PROSTATE SURGERY  09/2019    urolift by [address] [facility]    [address] Right 2018         [address]       Discharge Medication List as of 8/1/2022 10:40 AM        CONTINUE these medications which have NOT CHANGED    Details   pregabalin (LYRICA) 100 MG capsule Take 1 capsule by mouth in the morning and 1 capsule before bedtime. Do all this for 30 days. , Disp-60 capsule, R-5Normal      cyclobenzaprine (FLEXERIL) 10 MG tablet Take 1 tablet by mouth 3 times daily as needed for Muscle spasms, Disp-90 tablet, R-1Normal      predniSONE (DELTASONE) 10 MG tablet 6 po qd x 1wk, 5 po qd x 1wk, 4 po qd x 1wk, 3 po qd 130, Disp-130 tablet, R-0Please disregard rx written earlier for 10 pills. Normal      !! buPROPion (WELLBUTRIN XL) 300 MG extended release tablet TAKE 1 TABLET EVERY MORNING, Disp-90 tablet, R-3Normal      !! buPROPion (WELLBUTRIN XL) 150 MG extended release tablet TAKE 1 TABLET EVERY MORNING, Disp-90 tablet, R-3Normal      blood glucose monitor strips Test 1 time a day & as needed for symptoms of irregular blood glucose. Dispense sufficient amount for indicated testing frequency plus additional to accommodate PRN testing needs. , Disp-100 strip, R-3, Normal      metFORMIN (GLUCOPHAGE) 1000 MG tablet Take 1 tablet by mouth 2 times daily (with meals), Disp-180 tablet, R-3Normal      fenofibrate (TRIGLIDE) 160 MG tablet Take 1 tablet by mouth daily, Disp-90 tablet, R-3Normal      mometasone (NASONEX) 50 MCG/ACT nasal spray 2 sprays by Nasal route daily, Nasal, DAILY Starting Wed 2021, Disp-1 Inhaler, R-3, Normal      !! metoprolol succinate (TOPROL XL) 25 MG extended release tablet TAKE 1 TABLET DAILY. THIS IS IN ADDITION TO 50 MG TABLET TO TOTAL 75 MG, Disp-90 tablet, R-4Normal      !! metoprolol succinate (TOPROL XL) 50 MG extended release tablet TAKE 1 TABLET DAILY. THIS IS IN ADDITION TO 25 MG TABLET TO TOTAL 75 MG, Disp-90 tablet, R-4Normal      atorvastatin (LIPITOR) 80 MG tablet TAKE 1 TABLET NIGHTLY, Disp-90 tablet, R-4Normal      Aspirin (ASPIR-81 PO) Take 325 mg by mouth 2 times daily Historical Med      sildenafil (VIAGRA) 100 MG tablet Take 1 tablet by mouth as needed for Erectile Dysfunction, Disp-30 tablet, R-1Normal       !! - Potential duplicate medications found. Please discuss with provider.             ALLERGIES     Abilify [aripiprazole] and Oxycontin [oxycodone hcl]    FAMILYHISTORY       Family History   Problem Relation Age of Onset    Cancer Mother         ovarian, stage 3C dx'd , doing well 2018    Heart Failure Mother     Cancer Father         rectal, dx age 80    Heart Failure Father           SOCIAL HISTORY       Social History     Tobacco Use    Smoking status: Former     Packs/day: 2.50     Years: 15.00     Pack years: 37.50     Types: Cigarettes     Quit date: 2015     Years since quittin.9    Smokeless tobacco: Never    Tobacco comments:     quit tob , quit vaping    Vaping Use    Vaping Use: Former   Substance Use Topics    Alcohol use: No    Drug use: No       SCREENINGS    Drayden Coma Scale  Eye Opening: Spontaneous  Best Verbal Response: Oriented  Best Motor Response: Obeys commands  Michael Coma Scale Score: 15        PHYSICAL EXAM    (up to 7 for level 4, 8 24-Nov-2023 20-Dec-2023 PROCEDURES   Unless otherwise noted below, none     Foreign Body    Date/Time: 8/1/2022 10:51 AM  Performed by: Ruben Harrell PA-C  Authorized by: Ruben Harrell PA-C     Consent:     Consent obtained:  Verbal    Consent given by:  Patient    Risks discussed:  Pain, worsening of condition, poor cosmetic result and incomplete removal  Universal protocol:     Patient identity confirmed:  Verbally with patient  Location:     Location: Right cornea. Depth: Corneal.  Anesthesia:     Anesthesia method:  Topical application    Topical anesthesia: Proparacaine. Procedure details:     Localization method:  Visualized    Foreign bodies recovered:  2    Description:  2 small pinpoint black foreign bodies    Intact foreign body removal: yes    Post-procedure details:     Confirmation:  No additional foreign bodies on visualization    Procedure completion:  Tolerated    CRITICAL CARE TIME       CONSULTS:  None      EMERGENCY DEPARTMENT COURSE and DIFFERENTIAL DIAGNOSIS/MDM:   Vitals:    Vitals:    08/01/22 0933   BP: (!) 161/107   Pulse: 82   Resp: 16   Temp: 97.8 °F (36.6 °C)   TempSrc: Oral   SpO2: 97%   Weight: 255 lb (115.7 kg)   Height: 5' 10.5\" (1.791 m)       Patient was given the following medications:  Medications   sulfacetamide (BLEPH-10) 10 % ophthalmic solution 2 drop (has no administration in time range)   sulfacetamide (BLEPH-10) 10 % ophthalmic solution (has no administration in time range)   fluorescein 1 MG ophthalmic strip (has no administration in time range)   proparacaine (ALCAINE) 0.5 % ophthalmic solution (has no administration in time range)   Tetanus-Diphth-Acell Pertussis (BOOSTRIX) injection 0.5 mL (0.5 mLs IntraMUSCular Given 8/1/22 1044)         Is this patient to be included in the SEP-1 Core Measure due to severe sepsis or septic shock? No   Exclusion criteria - the patient is NOT to be included for SEP-1 Core Measure due to:   Infection is not suspected    Patient presented with corneal abrasion. Tetanus vaccination was updated. He had 2 small pinpoint foreign bodies removed on exam.  No residual rust ring or embedded corneal foreign body noted on exam.  He is from [facility]. He will be down here the rest of the days he will be discharged home of the left hand in order to start antibiotics. He states he still follows up at [facility]. Will follow-up with [facility] in the next 1 to 2 days. He was educated on concern for possible early corneal ulcer or or worsening of condition so understands he needs to follow-up closely with ophthalmology. He will return here for any worsening of symptoms or problems at home. FINAL IMPRESSION      1. Abrasion of right cornea, initial encounter    2.  Foreign body of right cornea, initial encounter          DISPOSITION/PLAN   DISPOSITION Decision To Discharge 08/01/2022 10:29:24 AM      PATIENT REFERRED TO:  [address] [place] [address] [address]    Schedule an appointment as soon as possible for a visit today  For re-check 1-2 days    [facility] Emergency Department  [address] [place] [address]  [phone]    As needed    DISCHARGE MEDICATIONS:  Discharge Medication List as of 8/1/2022 10:40 AM          DISCONTINUED MEDICATIONS:  Discharge Medication List as of 8/1/2022 10:40 AM                 (Please note that portions of this note were completed with a voice recognition program.  Efforts were made to edit the dictations but occasionally words are mis-transcribed.)    Sin Nicole PA-C (electronically signed)           Sin Nicole PA-C  08/01/22 1058 31-Oct-2023 06-Nov-2023 09-Nov-2023 27-Dec-2023 10-Nov-2023 09-Nov-2023 09-Nov-2023 20-Dec-2023 31-Oct-2023 29-Nov-2023 24-Nov-2023 09-Nov-2023 24-Nov-2023 20-Dec-2023 29-Nov-2023 10-Nov-2023 06-Dec-2023 29-Nov-2023 10-Nov-2023 20-Dec-2023 06-Nov-2023 29-Nov-2023 31-Oct-2023 06-Nov-2023 31-Oct-2023 06-Dec-2023 27-Dec-2023 29-Nov-2023 06-Nov-2023 13-Dec-2023 06-Dec-2023 13-Dec-2023 13-Dec-2023 20-Dec-2023 27-Dec-2023 29-Nov-2023 13-Dec-2023 06-Dec-2023

## 2024-02-23 NOTE — PROGRESS NOTES
Subjective:      Patient ID: Yvrose Mai is a 63 y.o. male. HPI  Chief Complaint   Patient presents with    1 Month Follow-Up     norco prescription    Percocet caused w/drawal sx's between doses, prefers to return to [place]. S/p ANGELICA b/l L4-5 per Dr Yasmani Tabares, feels LE pain is 50% reduced. Pt request referral to Dr Kandice Miller in [place], [place]. Feels he may have a better rapport with him than with Dr Yasmani Tabares. Has b/l knee and ankle pain 2/2 OA, would like ortho referral.    Has 2/15 appt wit Dr Gregorio Pittman, GI. UC under control. Has runny eyes, runny nose, sneezing x 6-7 wks. Sx's started after going to airport. Uses flonase qd. otc antihist's not helpful. Is on prav. Was on fenofibrate, stopped 2/2 wt loss. Glyxambi also stopped 2/2 100 lb wt loss. No sx's of high or low bs. Vision nl. Review of Systems   Constitutional: Negative for activity change, appetite change, fever and unexpected weight change. HENT: Positive for rhinorrhea and sneezing. Negative for congestion, ear pain, postnasal drip, sinus pain and sinus pressure. Respiratory: Negative for shortness of breath and wheezing. Cardiovascular: Negative for chest pain, palpitations and leg swelling. Psychiatric/Behavioral: Negative for decreased concentration and dysphoric mood. The patient is not nervous/anxious. Objective:   Physical Exam   Constitutional: He is oriented to person, place, and time. He appears well-developed and well-nourished. HENT:   Head: Normocephalic and atraumatic. Eyes: Conjunctivae are normal. No scleral icterus. Pulmonary/Chest: Effort normal.   Neurological: He is alert and oriented to person, place, and time. Skin: Skin is warm and dry. Psychiatric: He has a normal mood and affect.  His behavior is normal. Judgment and thought content normal.     /75   Pulse 111   Temp 97.7 °F (36.5 °C) (Oral)   Ht 5' 10.5\" (1.791 m)   Wt 208 lb (94.3 kg)   SpO2 98%   BMI 29.42 kg/m²     Assessment:
- K is 3.4 on admission,   - likely secondary to poor PO intake  - replacing KCl 40 mg x 1  - Continue to monitor electrolytes.  - f/u BMP

## 2024-03-07 ENCOUNTER — PATIENT MESSAGE (OUTPATIENT)
Dept: FAMILY MEDICINE CLINIC | Age: 69
End: 2024-03-07

## 2024-07-01 ENCOUNTER — COMMUNITY OUTREACH (OUTPATIENT)
Dept: FAMILY MEDICINE CLINIC | Age: 69
End: 2024-07-01

## 2024-09-29 ASSESSMENT — PATIENT HEALTH QUESTIONNAIRE - PHQ9
SUM OF ALL RESPONSES TO PHQ QUESTIONS 1-9: 12
1. LITTLE INTEREST OR PLEASURE IN DOING THINGS: NEARLY EVERY DAY
8. MOVING OR SPEAKING SO SLOWLY THAT OTHER PEOPLE COULD HAVE NOTICED. OR THE OPPOSITE - BEING SO FIDGETY OR RESTLESS THAT YOU HAVE BEEN MOVING AROUND A LOT MORE THAN USUAL: NOT AT ALL
SUM OF ALL RESPONSES TO PHQ QUESTIONS 1-9: 12
10. IF YOU CHECKED OFF ANY PROBLEMS, HOW DIFFICULT HAVE THESE PROBLEMS MADE IT FOR YOU TO DO YOUR WORK, TAKE CARE OF THINGS AT HOME, OR GET ALONG WITH OTHER PEOPLE: SOMEWHAT DIFFICULT
SUM OF ALL RESPONSES TO PHQ QUESTIONS 1-9: 12
9. THOUGHTS THAT YOU WOULD BE BETTER OFF DEAD, OR OF HURTING YOURSELF: NOT AT ALL
10. IF YOU CHECKED OFF ANY PROBLEMS, HOW DIFFICULT HAVE THESE PROBLEMS MADE IT FOR YOU TO DO YOUR WORK, TAKE CARE OF THINGS AT HOME, OR GET ALONG WITH OTHER PEOPLE: SOMEWHAT DIFFICULT
4. FEELING TIRED OR HAVING LITTLE ENERGY: NEARLY EVERY DAY
6. FEELING BAD ABOUT YOURSELF - OR THAT YOU ARE A FAILURE OR HAVE LET YOURSELF OR YOUR FAMILY DOWN: NOT AT ALL
3. TROUBLE FALLING OR STAYING ASLEEP: NEARLY EVERY DAY
SUM OF ALL RESPONSES TO PHQ QUESTIONS 1-9: 12
5. POOR APPETITE OR OVEREATING: NEARLY EVERY DAY
SUM OF ALL RESPONSES TO PHQ QUESTIONS 1-9: 12
2. FEELING DOWN, DEPRESSED OR HOPELESS: NOT AT ALL
6. FEELING BAD ABOUT YOURSELF - OR THAT YOU ARE A FAILURE OR HAVE LET YOURSELF OR YOUR FAMILY DOWN: NOT AT ALL
7. TROUBLE CONCENTRATING ON THINGS, SUCH AS READING THE NEWSPAPER OR WATCHING TELEVISION: NOT AT ALL
SUM OF ALL RESPONSES TO PHQ9 QUESTIONS 1 & 2: 3
9. THOUGHTS THAT YOU WOULD BE BETTER OFF DEAD, OR OF HURTING YOURSELF: NOT AT ALL
1. LITTLE INTEREST OR PLEASURE IN DOING THINGS: NEARLY EVERY DAY
2. FEELING DOWN, DEPRESSED OR HOPELESS: NOT AT ALL
7. TROUBLE CONCENTRATING ON THINGS, SUCH AS READING THE NEWSPAPER OR WATCHING TELEVISION: NOT AT ALL
4. FEELING TIRED OR HAVING LITTLE ENERGY: NEARLY EVERY DAY
3. TROUBLE FALLING OR STAYING ASLEEP: NEARLY EVERY DAY
8. MOVING OR SPEAKING SO SLOWLY THAT OTHER PEOPLE COULD HAVE NOTICED. OR THE OPPOSITE, BEING SO FIGETY OR RESTLESS THAT YOU HAVE BEEN MOVING AROUND A LOT MORE THAN USUAL: NOT AT ALL
5. POOR APPETITE OR OVEREATING: NEARLY EVERY DAY

## 2024-09-30 ENCOUNTER — OFFICE VISIT (OUTPATIENT)
Dept: FAMILY MEDICINE CLINIC | Age: 69
End: 2024-09-30
Payer: MEDICARE

## 2024-09-30 VITALS
DIASTOLIC BLOOD PRESSURE: 92 MMHG | OXYGEN SATURATION: 96 % | SYSTOLIC BLOOD PRESSURE: 142 MMHG | BODY MASS INDEX: 32.9 KG/M2 | HEIGHT: 71 IN | WEIGHT: 235 LBS | HEART RATE: 79 BPM

## 2024-09-30 DIAGNOSIS — R79.89 ELEVATED LFTS: ICD-10-CM

## 2024-09-30 DIAGNOSIS — I25.5 ISCHEMIC CARDIOMYOPATHY: ICD-10-CM

## 2024-09-30 DIAGNOSIS — I10 BENIGN ESSENTIAL HTN: Primary | ICD-10-CM

## 2024-09-30 DIAGNOSIS — N18.31 STAGE 3A CHRONIC KIDNEY DISEASE (HCC): ICD-10-CM

## 2024-09-30 DIAGNOSIS — F31.81 BIPOLAR 2 DISORDER (HCC): ICD-10-CM

## 2024-09-30 DIAGNOSIS — Z23 NEEDS FLU SHOT: ICD-10-CM

## 2024-09-30 DIAGNOSIS — J30.89 NON-SEASONAL ALLERGIC RHINITIS DUE TO OTHER ALLERGIC TRIGGER: ICD-10-CM

## 2024-09-30 DIAGNOSIS — E11.65 CONTROLLED TYPE 2 DIABETES MELLITUS WITH HYPERGLYCEMIA, WITHOUT LONG-TERM CURRENT USE OF INSULIN (HCC): ICD-10-CM

## 2024-09-30 DIAGNOSIS — J34.89 RHINORRHEA: ICD-10-CM

## 2024-09-30 DIAGNOSIS — I50.22 CHRONIC SYSTOLIC (CONGESTIVE) HEART FAILURE (HCC): ICD-10-CM

## 2024-09-30 DIAGNOSIS — K21.9 GASTROESOPHAGEAL REFLUX DISEASE WITHOUT ESOPHAGITIS: ICD-10-CM

## 2024-09-30 PROCEDURE — 3017F COLORECTAL CA SCREEN DOC REV: CPT | Performed by: FAMILY MEDICINE

## 2024-09-30 PROCEDURE — G8427 DOCREV CUR MEDS BY ELIG CLIN: HCPCS | Performed by: FAMILY MEDICINE

## 2024-09-30 PROCEDURE — 1123F ACP DISCUSS/DSCN MKR DOCD: CPT | Performed by: FAMILY MEDICINE

## 2024-09-30 PROCEDURE — 3046F HEMOGLOBIN A1C LEVEL >9.0%: CPT | Performed by: FAMILY MEDICINE

## 2024-09-30 PROCEDURE — 3080F DIAST BP >= 90 MM HG: CPT | Performed by: FAMILY MEDICINE

## 2024-09-30 PROCEDURE — 90653 IIV ADJUVANT VACCINE IM: CPT | Performed by: FAMILY MEDICINE

## 2024-09-30 PROCEDURE — G2211 COMPLEX E/M VISIT ADD ON: HCPCS | Performed by: FAMILY MEDICINE

## 2024-09-30 PROCEDURE — 99214 OFFICE O/P EST MOD 30 MIN: CPT | Performed by: FAMILY MEDICINE

## 2024-09-30 PROCEDURE — 2022F DILAT RTA XM EVC RTNOPTHY: CPT | Performed by: FAMILY MEDICINE

## 2024-09-30 PROCEDURE — 3075F SYST BP GE 130 - 139MM HG: CPT | Performed by: FAMILY MEDICINE

## 2024-09-30 PROCEDURE — G8417 CALC BMI ABV UP PARAM F/U: HCPCS | Performed by: FAMILY MEDICINE

## 2024-09-30 PROCEDURE — G0008 ADMIN INFLUENZA VIRUS VAC: HCPCS | Performed by: FAMILY MEDICINE

## 2024-09-30 PROCEDURE — 1036F TOBACCO NON-USER: CPT | Performed by: FAMILY MEDICINE

## 2024-09-30 RX ORDER — METOPROLOL SUCCINATE 100 MG/1
100 TABLET, EXTENDED RELEASE ORAL DAILY
Qty: 90 TABLET | Refills: 3 | Status: SHIPPED | OUTPATIENT
Start: 2024-09-30

## 2024-09-30 RX ORDER — IPRATROPIUM BROMIDE 42 UG/1
SPRAY, METERED NASAL
Qty: 75 ML | Refills: 3 | Status: SHIPPED | OUTPATIENT
Start: 2024-09-30

## 2024-09-30 RX ORDER — BUPROPION HYDROCHLORIDE 150 MG/1
TABLET ORAL
Qty: 90 TABLET | Refills: 3 | Status: SHIPPED | OUTPATIENT
Start: 2024-09-30

## 2024-09-30 RX ORDER — FAMOTIDINE 20 MG/1
TABLET, FILM COATED ORAL
Qty: 180 TABLET | Refills: 3 | Status: SHIPPED | OUTPATIENT
Start: 2024-09-30

## 2024-09-30 RX ORDER — LAMOTRIGINE 25 MG/1
50 TABLET ORAL DAILY
Qty: 180 TABLET | Refills: 3 | Status: SHIPPED | OUTPATIENT
Start: 2024-09-30

## 2024-09-30 RX ORDER — BUPROPION HYDROCHLORIDE 300 MG/1
TABLET ORAL
Qty: 90 TABLET | Refills: 3 | Status: SHIPPED | OUTPATIENT
Start: 2024-09-30

## 2024-09-30 RX ORDER — FLUTICASONE PROPIONATE 50 MCG
SPRAY, SUSPENSION (ML) NASAL
Qty: 48 G | Refills: 3 | Status: SHIPPED | OUTPATIENT
Start: 2024-09-30

## 2024-09-30 NOTE — PROGRESS NOTES
Assessment/Plan:    Jericho \"Don\" was seen today for diabetes.    Diagnoses and all orders for this visit:    Benign essential HTN  -     metoprolol succinate (TOPROL XL) 100 MG extended release tablet; Take 1 tablet by mouth daily, increase from 25+50 mg qd.   Pt to monitor home bp per pt instructions.    Bipolar 2 disorder (HCC), rf  -     buPROPion (WELLBUTRIN XL) 150 MG extended release tablet; TAKE 1 TABLET BY MOUTH  DAILY IN THE MORNING IN  ADDITION TO 300MG TAB FOR  TOTAL OF 450MG IN THE  MORNING  -     buPROPion (WELLBUTRIN XL) 300 MG extended release tablet; TAKE 1 TABLET BY MOUTH  DAILY IN THE MORNING IN  ADDITION TO 150MG TABLET  FOR TOTAL OF 450MG IN THE  MORNING  -     lamoTRIgine (LAMICTAL) 25 MG tablet; Take 2 tablets by mouth daily    Chronic systolic (congestive) heart failure (HCC)   Asymptomatic. Pt is no longer following up with cards.    Controlled type 2 diabetes mellitus with hyperglycemia, without long-term current use of insulin (HCC)  -     Comprehensive Metabolic Panel; Future  -     CBC with Auto Differential; Future  -     Hemoglobin A1C; Future, poct A1c=7.1, much improved, due to pt's wt loss of 20+ lbs and return to taking meds routinely.  -     empagliflozin (JARDIANCE) 25 MG tablet; Take 1 tablet by mouth daily, rf  -     metFORMIN (GLUCOPHAGE) 1000 MG tablet; TAKE 1 TABLET BY MOUTH IN  THE MORNING AND 1 TABLET IN THE EVENING WITH MEALS, rf    Stage 3a chronic kidney disease (HCC)   Concern regarding pt's ibu and aleve intake. Pt reiterates he is seeking comfort and quality over quantitiy of life.   Await labs.    Non-seasonal allergic rhinitis due to other allergic trigger  -     fluticasone (FLONASE) 50 MCG/ACT nasal spray; USE 2 SPRAYS IN EACH NOSTRIL ONCE DAILY, rf    Ischemic cardiomyopathy   Asymptomatic   Pt has not tolerated lipids 2/2 myalgia.    Gastroesophageal reflux disease without esophagitis  -     famotidine (PEPCID) 20 MG tablet; TAKE 1 TABLET BY MOUTH IN  THE

## 2024-10-09 ENCOUNTER — OFFICE VISIT (OUTPATIENT)
Dept: FAMILY MEDICINE CLINIC | Age: 69
End: 2024-10-09
Payer: MEDICARE

## 2024-10-09 VITALS
WEIGHT: 231 LBS | HEART RATE: 68 BPM | SYSTOLIC BLOOD PRESSURE: 126 MMHG | BODY MASS INDEX: 32.34 KG/M2 | HEIGHT: 71 IN | DIASTOLIC BLOOD PRESSURE: 78 MMHG | OXYGEN SATURATION: 98 %

## 2024-10-09 DIAGNOSIS — H25.013 CORTICAL AGE-RELATED CATARACT OF BOTH EYES: Primary | ICD-10-CM

## 2024-10-09 DIAGNOSIS — I10 BENIGN ESSENTIAL HTN: ICD-10-CM

## 2024-10-09 DIAGNOSIS — Z01.818 PREOP EXAMINATION: ICD-10-CM

## 2024-10-09 DIAGNOSIS — E11.65 CONTROLLED TYPE 2 DIABETES MELLITUS WITH HYPERGLYCEMIA, WITHOUT LONG-TERM CURRENT USE OF INSULIN (HCC): ICD-10-CM

## 2024-10-09 PROCEDURE — 3078F DIAST BP <80 MM HG: CPT | Performed by: FAMILY MEDICINE

## 2024-10-09 PROCEDURE — G8417 CALC BMI ABV UP PARAM F/U: HCPCS | Performed by: FAMILY MEDICINE

## 2024-10-09 PROCEDURE — G8482 FLU IMMUNIZE ORDER/ADMIN: HCPCS | Performed by: FAMILY MEDICINE

## 2024-10-09 PROCEDURE — G8427 DOCREV CUR MEDS BY ELIG CLIN: HCPCS | Performed by: FAMILY MEDICINE

## 2024-10-09 PROCEDURE — 2022F DILAT RTA XM EVC RTNOPTHY: CPT | Performed by: FAMILY MEDICINE

## 2024-10-09 PROCEDURE — 99214 OFFICE O/P EST MOD 30 MIN: CPT | Performed by: FAMILY MEDICINE

## 2024-10-09 PROCEDURE — 3017F COLORECTAL CA SCREEN DOC REV: CPT | Performed by: FAMILY MEDICINE

## 2024-10-09 PROCEDURE — 1036F TOBACCO NON-USER: CPT | Performed by: FAMILY MEDICINE

## 2024-10-09 PROCEDURE — 1123F ACP DISCUSS/DSCN MKR DOCD: CPT | Performed by: FAMILY MEDICINE

## 2024-10-09 PROCEDURE — 3051F HG A1C>EQUAL 7.0%<8.0%: CPT | Performed by: FAMILY MEDICINE

## 2024-10-09 PROCEDURE — 3074F SYST BP LT 130 MM HG: CPT | Performed by: FAMILY MEDICINE

## 2024-10-09 NOTE — PROGRESS NOTES
carotid bruit    Heart:  Normal apical impulse, regular rate and rhythm, normal S1 and S2, no S3 or S4, and no murmur noted    Lungs:  No increased work of breathing, good air exchange, clear to auscultation bilaterally, no crackles or wheezing    Abdomen:   normal bowel sounds, soft, non-distended, non-tender, no masses palpated, no hepatosplenomegally    Extremities:  No clubbing, cyanosis, or edema, 2+ pulses    NEUROLOGIC:  Awake, alert, oriented to name, place and time.  Cranial nerves II-XII are grossly intact.  Motor is 5 out of 5 bilaterally.     Hgb A1C 7.1% 9/30/24    ASSESSMENT AND PLAN:    1.  Patient is a 69 y.o. male who is cleared for the above specified procedure planned on 10/28/24 L and 11/18/24 R with Dr. Kelsi Gaines at [facility].         CHARLOTTE HAMMER MD    [address] [address] [phone]    Jericho \"Don\" was seen today for pre-op exam.    Diagnoses and all orders for this visit:    Cortical age-related cataract of both eyes, Preop examination   Cleared for surgery    Benign essential HTN   Controlled with increase to metoprolol  mg qd. Pulse remains nl w/o bradycardia.    Controlled type 2 diabetes mellitus with hyperglycemia, without long-term current use of insulin (HCC)   Controller per A1c as above.

## 2024-10-11 ENCOUNTER — TELEPHONE (OUTPATIENT)
Dept: FAMILY MEDICINE CLINIC | Age: 69
End: 2024-10-11

## 2024-10-11 NOTE — TELEPHONE ENCOUNTER
Preop is complete. PLs print and forward to [facility] [place], Dr Gaines. Yudith has fax number. OK to wait until Mon 10/14/24. Thx.

## 2025-01-13 ENCOUNTER — OFFICE VISIT (OUTPATIENT)
Dept: FAMILY MEDICINE CLINIC | Age: 70
End: 2025-01-13

## 2025-01-13 VITALS
OXYGEN SATURATION: 96 % | DIASTOLIC BLOOD PRESSURE: 80 MMHG | SYSTOLIC BLOOD PRESSURE: 124 MMHG | HEART RATE: 89 BPM | BODY MASS INDEX: 32.76 KG/M2 | HEIGHT: 71 IN | WEIGHT: 234 LBS

## 2025-01-13 DIAGNOSIS — K05.10 GINGIVITIS: ICD-10-CM

## 2025-01-13 DIAGNOSIS — Z11.3 SCREEN FOR STD (SEXUALLY TRANSMITTED DISEASE): ICD-10-CM

## 2025-01-13 DIAGNOSIS — E78.2 MIXED HYPERLIPIDEMIA: ICD-10-CM

## 2025-01-13 DIAGNOSIS — N52.01 ERECTILE DYSFUNCTION DUE TO ARTERIAL INSUFFICIENCY: ICD-10-CM

## 2025-01-13 DIAGNOSIS — I50.22 CHRONIC SYSTOLIC (CONGESTIVE) HEART FAILURE (HCC): ICD-10-CM

## 2025-01-13 DIAGNOSIS — L82.1 SEBORRHEIC KERATOSES: ICD-10-CM

## 2025-01-13 DIAGNOSIS — Z91.81 AT HIGH RISK FOR FALLS: ICD-10-CM

## 2025-01-13 DIAGNOSIS — F31.81 BIPOLAR 2 DISORDER (HCC): ICD-10-CM

## 2025-01-13 DIAGNOSIS — E11.40 TYPE 2 DIABETES MELLITUS WITH DIABETIC NEUROPATHY, WITHOUT LONG-TERM CURRENT USE OF INSULIN (HCC): ICD-10-CM

## 2025-01-13 DIAGNOSIS — E11.40 TYPE 2 DIABETES MELLITUS WITH DIABETIC NEUROPATHY, WITHOUT LONG-TERM CURRENT USE OF INSULIN (HCC): Primary | ICD-10-CM

## 2025-01-13 DIAGNOSIS — N18.31 STAGE 3A CHRONIC KIDNEY DISEASE (HCC): ICD-10-CM

## 2025-01-13 DIAGNOSIS — E29.1 HYPOGONADISM MALE: ICD-10-CM

## 2025-01-13 LAB — HBA1C MFR BLD: 6.9 %

## 2025-01-13 RX ORDER — CLINDAMYCIN HYDROCHLORIDE 300 MG/1
300 CAPSULE ORAL 2 TIMES DAILY
Qty: 14 CAPSULE | Refills: 0 | Status: SHIPPED | OUTPATIENT
Start: 2025-01-13 | End: 2025-01-20

## 2025-01-13 SDOH — ECONOMIC STABILITY: FOOD INSECURITY: WITHIN THE PAST 12 MONTHS, YOU WORRIED THAT YOUR FOOD WOULD RUN OUT BEFORE YOU GOT MONEY TO BUY MORE.: NEVER TRUE

## 2025-01-13 SDOH — ECONOMIC STABILITY: FOOD INSECURITY: WITHIN THE PAST 12 MONTHS, THE FOOD YOU BOUGHT JUST DIDN'T LAST AND YOU DIDN'T HAVE MONEY TO GET MORE.: NEVER TRUE

## 2025-01-13 ASSESSMENT — PATIENT HEALTH QUESTIONNAIRE - PHQ9
7. TROUBLE CONCENTRATING ON THINGS, SUCH AS READING THE NEWSPAPER OR WATCHING TELEVISION: NOT AT ALL
SUM OF ALL RESPONSES TO PHQ QUESTIONS 1-9: 6
2. FEELING DOWN, DEPRESSED OR HOPELESS: NOT AT ALL
5. POOR APPETITE OR OVEREATING: NOT AT ALL
SUM OF ALL RESPONSES TO PHQ9 QUESTIONS 1 & 2: 0
6. FEELING BAD ABOUT YOURSELF - OR THAT YOU ARE A FAILURE OR HAVE LET YOURSELF OR YOUR FAMILY DOWN: NOT AT ALL
4. FEELING TIRED OR HAVING LITTLE ENERGY: NEARLY EVERY DAY
8. MOVING OR SPEAKING SO SLOWLY THAT OTHER PEOPLE COULD HAVE NOTICED. OR THE OPPOSITE, BEING SO FIGETY OR RESTLESS THAT YOU HAVE BEEN MOVING AROUND A LOT MORE THAN USUAL: NOT AT ALL
10. IF YOU CHECKED OFF ANY PROBLEMS, HOW DIFFICULT HAVE THESE PROBLEMS MADE IT FOR YOU TO DO YOUR WORK, TAKE CARE OF THINGS AT HOME, OR GET ALONG WITH OTHER PEOPLE: NOT DIFFICULT AT ALL
SUM OF ALL RESPONSES TO PHQ QUESTIONS 1-9: 6
SUM OF ALL RESPONSES TO PHQ QUESTIONS 1-9: 6
3. TROUBLE FALLING OR STAYING ASLEEP: NEARLY EVERY DAY
1. LITTLE INTEREST OR PLEASURE IN DOING THINGS: NOT AT ALL
SUM OF ALL RESPONSES TO PHQ QUESTIONS 1-9: 6
9. THOUGHTS THAT YOU WOULD BE BETTER OFF DEAD, OR OF HURTING YOURSELF: NOT AT ALL

## 2025-01-13 NOTE — PROGRESS NOTES
Assessment/Plan:    Jericho \"Don\" was seen today for diabetes.    Diagnoses and all orders for this visit:    Type 2 diabetes mellitus with diabetic neuropathy, without long-term current use of insulin (HCC)  -     POCT glycosylated hemoglobin (Hb A1C) 6.9. Serum A1c is repeated as 6.9 seems unusually low. Pt has not been eating mindfully lately.  -     Comprehensive Metabolic Panel; Future  -     Hemoglobin A1C; Future  -     Albumin/Creatinine Ratio, Urine; Future    Screen for STD (sexually transmitted disease)  -     C.trachomatis N.gonorrhoeae DNA, Urine; Future  -     HIV Screen; Future  -     Syphilis Antibody Cascading Reflex; Future  -     Hepatitis C Antibody; Future    At high risk for falls    Chronic systolic (congestive) heart failure (HCC)   Pt is asymptomatic and declines meds, cards f/u.    Bipolar 2 disorder (HCC)   Pt describes himself as antsy but not manic. Agree that pt appears energetic, perhaps anxious if not hypomanic.   Will ask pt to resume lamictal and wellbutrin pending labs.    Stage 3a chronic kidney disease (HCC)   Pt is aware of importance of nsaid avoidance but opts to take at times 2/2 severe pain.    Mixed hyperlipidemia  -     Lipid Panel; Future   Pt had stopped atorvastatin and fenofibrate in 2022 2/2 myalgia. Await labs.    Gingivitis  -     clindamycin (CLEOCIN) 300 MG capsule; Take 1 capsule by mouth 2 times daily for 7 days   Possibly trauma induced from flossing.   Pt is working to find dentist.    Erectile dysfunction due to arterial insufficiency  -     Testosterone, free, total; Future  -     Testosterone, free, total; Future  -     Testosterone, free, total; Future   Pt requests level the day before weekly dose, midpoint and the day after dose. Explained that testosterone level is to be checked midpoint between doses before 10 am. Pt understands but opts to pay for other 2 levels, assuming insurance will not cover.    Hypogonadism male   Pt agrees to non BB bp med. Pt

## 2025-01-14 LAB
ALBUMIN SERPL-MCNC: 4.5 G/DL (ref 3.4–5)
ALBUMIN/GLOB SERPL: 1.8 {RATIO} (ref 1.1–2.2)
ALP SERPL-CCNC: 111 U/L (ref 40–129)
ALT SERPL-CCNC: 44 U/L (ref 10–40)
ANION GAP SERPL CALCULATED.3IONS-SCNC: 16 MMOL/L (ref 3–16)
AST SERPL-CCNC: 26 U/L (ref 15–37)
BILIRUB SERPL-MCNC: 0.4 MG/DL (ref 0–1)
BUN SERPL-MCNC: 12 MG/DL (ref 7–20)
C TRACH DNA UR QL NAA+PROBE: NEGATIVE
CALCIUM SERPL-MCNC: 9.4 MG/DL (ref 8.3–10.6)
CHLORIDE SERPL-SCNC: 100 MMOL/L (ref 99–110)
CHOLEST SERPL-MCNC: 262 MG/DL (ref 0–199)
CO2 SERPL-SCNC: 23 MMOL/L (ref 21–32)
CREAT SERPL-MCNC: 1.5 MG/DL (ref 0.8–1.3)
CREAT UR-MCNC: 171 MG/DL (ref 39–259)
EST. AVERAGE GLUCOSE BLD GHB EST-MCNC: 162.8 MG/DL
GFR SERPLBLD CREATININE-BSD FMLA CKD-EPI: 50 ML/MIN/{1.73_M2}
GLUCOSE SERPL-MCNC: 137 MG/DL (ref 70–99)
HBA1C MFR BLD: 7.3 %
HCV AB SERPL QL IA: NORMAL
HDLC SERPL-MCNC: 48 MG/DL (ref 40–60)
HIV 1+2 AB+HIV1 P24 AG SERPL QL IA: NORMAL
HIV 2 AB SERPL QL IA: NORMAL
HIV1 AB SERPL QL IA: NORMAL
HIV1 P24 AG SERPL QL IA: NORMAL
LDLC SERPL CALC-MCNC: 172 MG/DL
MICROALBUMIN UR DL<=1MG/L-MCNC: 21 MG/DL
MICROALBUMIN/CREAT UR: 122.8 MG/G (ref 0–30)
N GONORRHOEA DNA UR QL NAA+PROBE: NEGATIVE
POTASSIUM SERPL-SCNC: 3.9 MMOL/L (ref 3.5–5.1)
PROT SERPL-MCNC: 7 G/DL (ref 6.4–8.2)
REAGIN+T PALLIDUM IGG+IGM SERPL-IMP: NORMAL
SODIUM SERPL-SCNC: 139 MMOL/L (ref 136–145)
TRIGL SERPL-MCNC: 210 MG/DL (ref 0–150)
VLDLC SERPL CALC-MCNC: 42 MG/DL

## 2025-01-15 LAB
SHBG SERPL-SCNC: 29 NMOL/L (ref 19–76)
TESTOST FREE SERPL-MCNC: 142.9 PG/ML (ref 47–244)
TESTOST SERPL-MCNC: 612 NG/DL (ref 193–740)

## 2025-01-21 RX ORDER — BUPROPION HYDROCHLORIDE 150 MG/1
150 TABLET ORAL EVERY MORNING
Qty: 90 TABLET | Refills: 3 | Status: SHIPPED | OUTPATIENT
Start: 2025-01-21 | End: 2025-01-27 | Stop reason: SDUPTHER

## 2025-01-27 RX ORDER — BUPROPION HYDROCHLORIDE 150 MG/1
150 TABLET ORAL EVERY MORNING
Qty: 90 TABLET | Refills: 3 | Status: SHIPPED | OUTPATIENT
Start: 2025-01-27

## 2025-02-13 LAB
TESTOSTERONE FREE-MALE: 147.7 PG/ML (ref 35–155)
TESTOSTERONE TOTAL-MALE: 469 NG/DL (ref 250–1100)

## 2025-03-06 DIAGNOSIS — J30.89 NON-SEASONAL ALLERGIC RHINITIS DUE TO OTHER ALLERGIC TRIGGER: ICD-10-CM

## 2025-03-06 RX ORDER — FLUTICASONE PROPIONATE 50 MCG
SPRAY, SUSPENSION (ML) NASAL
Qty: 3 EACH | Refills: 3 | Status: SHIPPED | OUTPATIENT
Start: 2025-03-06

## 2025-07-29 DIAGNOSIS — F31.81 BIPOLAR 2 DISORDER (HCC): ICD-10-CM

## 2025-07-30 RX ORDER — LAMOTRIGINE 25 MG/1
50 TABLET ORAL DAILY
Qty: 180 TABLET | Refills: 3 | Status: SHIPPED | OUTPATIENT
Start: 2025-07-30